# Patient Record
Sex: FEMALE | Race: WHITE | NOT HISPANIC OR LATINO | Employment: FULL TIME | URBAN - METROPOLITAN AREA
[De-identification: names, ages, dates, MRNs, and addresses within clinical notes are randomized per-mention and may not be internally consistent; named-entity substitution may affect disease eponyms.]

---

## 2017-02-01 RX ORDER — ATORVASTATIN CALCIUM 20 MG/1
TABLET, FILM COATED ORAL
Qty: 30 TABLET | Refills: 0 | Status: SHIPPED | OUTPATIENT
Start: 2017-02-01 | End: 2017-03-14 | Stop reason: SDUPTHER

## 2017-03-14 RX ORDER — ATORVASTATIN CALCIUM 20 MG/1
TABLET, FILM COATED ORAL
Qty: 30 TABLET | Refills: 0 | Status: SHIPPED | OUTPATIENT
Start: 2017-03-14 | End: 2017-06-02 | Stop reason: SDUPTHER

## 2017-05-23 ENCOUNTER — OFFICE VISIT (OUTPATIENT)
Dept: FAMILY MEDICINE CLINIC | Facility: CLINIC | Age: 52
End: 2017-05-23

## 2017-05-23 VITALS
TEMPERATURE: 97.9 F | RESPIRATION RATE: 16 BRPM | OXYGEN SATURATION: 98 % | HEIGHT: 64 IN | WEIGHT: 157 LBS | BODY MASS INDEX: 26.8 KG/M2 | SYSTOLIC BLOOD PRESSURE: 121 MMHG | DIASTOLIC BLOOD PRESSURE: 76 MMHG | HEART RATE: 106 BPM

## 2017-05-23 DIAGNOSIS — R05.9 COUGH: ICD-10-CM

## 2017-05-23 DIAGNOSIS — J06.9 ACUTE URI: Primary | ICD-10-CM

## 2017-05-23 DIAGNOSIS — R51.9 HEADACHE, UNSPECIFIED HEADACHE TYPE: ICD-10-CM

## 2017-05-23 DIAGNOSIS — R09.89 CHEST CONGESTION: ICD-10-CM

## 2017-05-23 PROCEDURE — 99214 OFFICE O/P EST MOD 30 MIN: CPT | Performed by: FAMILY MEDICINE

## 2017-05-23 RX ORDER — AZITHROMYCIN 250 MG/1
TABLET, FILM COATED ORAL
Qty: 6 TABLET | Refills: 0 | Status: SHIPPED | OUTPATIENT
Start: 2017-05-23 | End: 2017-06-02

## 2017-05-24 LAB
25(OH)D3+25(OH)D2 SERPL-MCNC: 22.9 NG/ML (ref 30–100)
ALBUMIN SERPL-MCNC: 5 G/DL (ref 3.5–5.2)
ALBUMIN/GLOB SERPL: 2.1 G/DL
ALP SERPL-CCNC: 123 U/L (ref 39–117)
ALT SERPL-CCNC: 19 U/L (ref 1–33)
AST SERPL-CCNC: 18 U/L (ref 1–32)
BASOPHILS # BLD AUTO: 0.02 10*3/MM3 (ref 0–0.2)
BASOPHILS NFR BLD AUTO: 0.3 % (ref 0–1.5)
BILIRUB SERPL-MCNC: 0.5 MG/DL (ref 0.1–1.2)
BUN SERPL-MCNC: 11 MG/DL (ref 6–20)
BUN/CREAT SERPL: 14.7 (ref 7–25)
CALCIUM SERPL-MCNC: 9.7 MG/DL (ref 8.6–10.5)
CHLORIDE SERPL-SCNC: 98 MMOL/L (ref 98–107)
CHOLEST SERPL-MCNC: 158 MG/DL (ref 0–200)
CO2 SERPL-SCNC: 27.3 MMOL/L (ref 22–29)
CREAT SERPL-MCNC: 0.75 MG/DL (ref 0.57–1)
EOSINOPHIL # BLD AUTO: 0.38 10*3/MM3 (ref 0–0.7)
EOSINOPHIL NFR BLD AUTO: 5.1 % (ref 0.3–6.2)
ERYTHROCYTE [DISTWIDTH] IN BLOOD BY AUTOMATED COUNT: 13.1 % (ref 11.7–13)
GLOBULIN SER CALC-MCNC: 2.4 GM/DL
GLUCOSE SERPL-MCNC: 94 MG/DL (ref 65–99)
HCT VFR BLD AUTO: 45.2 % (ref 35.6–45.5)
HDLC SERPL-MCNC: 43 MG/DL (ref 40–60)
HGB BLD-MCNC: 15.1 G/DL (ref 11.9–15.5)
IMM GRANULOCYTES # BLD: 0.03 10*3/MM3 (ref 0–0.03)
IMM GRANULOCYTES NFR BLD: 0.4 % (ref 0–0.5)
LDLC SERPL CALC-MCNC: 90 MG/DL (ref 0–100)
LYMPHOCYTES # BLD AUTO: 1.62 10*3/MM3 (ref 0.9–4.8)
LYMPHOCYTES NFR BLD AUTO: 21.9 % (ref 19.6–45.3)
MCH RBC QN AUTO: 30.7 PG (ref 26.9–32)
MCHC RBC AUTO-ENTMCNC: 33.4 G/DL (ref 32.4–36.3)
MCV RBC AUTO: 91.9 FL (ref 80.5–98.2)
MONOCYTES # BLD AUTO: 0.96 10*3/MM3 (ref 0.2–1.2)
MONOCYTES NFR BLD AUTO: 13 % (ref 5–12)
NEUTROPHILS # BLD AUTO: 4.39 10*3/MM3 (ref 1.9–8.1)
NEUTROPHILS NFR BLD AUTO: 59.3 % (ref 42.7–76)
PLATELET # BLD AUTO: 246 10*3/MM3 (ref 140–500)
POTASSIUM SERPL-SCNC: 4.6 MMOL/L (ref 3.5–5.2)
PROT SERPL-MCNC: 7.4 G/DL (ref 6–8.5)
RBC # BLD AUTO: 4.92 10*6/MM3 (ref 3.9–5.2)
SODIUM SERPL-SCNC: 140 MMOL/L (ref 136–145)
T4 FREE SERPL-MCNC: 1.1 NG/DL (ref 0.93–1.7)
TRIGL SERPL-MCNC: 126 MG/DL (ref 0–150)
TSH SERPL DL<=0.005 MIU/L-ACNC: 1.79 MIU/ML (ref 0.27–4.2)
VLDLC SERPL CALC-MCNC: 25.2 MG/DL (ref 5–40)
WBC # BLD AUTO: 7.4 10*3/MM3 (ref 4.5–10.7)

## 2017-06-02 ENCOUNTER — OFFICE VISIT (OUTPATIENT)
Dept: FAMILY MEDICINE CLINIC | Facility: CLINIC | Age: 52
End: 2017-06-02

## 2017-06-02 VITALS
WEIGHT: 159 LBS | RESPIRATION RATE: 16 BRPM | HEART RATE: 69 BPM | SYSTOLIC BLOOD PRESSURE: 122 MMHG | TEMPERATURE: 98.5 F | OXYGEN SATURATION: 97 % | BODY MASS INDEX: 27.14 KG/M2 | DIASTOLIC BLOOD PRESSURE: 82 MMHG | HEIGHT: 64 IN

## 2017-06-02 DIAGNOSIS — R09.89 BRUIT OF LEFT CAROTID ARTERY: ICD-10-CM

## 2017-06-02 DIAGNOSIS — R79.89 LFT ELEVATION: ICD-10-CM

## 2017-06-02 DIAGNOSIS — K21.9 GASTROESOPHAGEAL REFLUX DISEASE WITHOUT ESOPHAGITIS: ICD-10-CM

## 2017-06-02 DIAGNOSIS — E55.9 VITAMIN D DEFICIENCY: ICD-10-CM

## 2017-06-02 DIAGNOSIS — Z12.31 ENCOUNTER FOR SCREENING MAMMOGRAM FOR BREAST CANCER: ICD-10-CM

## 2017-06-02 DIAGNOSIS — N30.01 ACUTE CYSTITIS WITH HEMATURIA: ICD-10-CM

## 2017-06-02 DIAGNOSIS — E78.2 MIXED HYPERLIPIDEMIA: ICD-10-CM

## 2017-06-02 DIAGNOSIS — R31.9 BLOOD IN URINE: Primary | ICD-10-CM

## 2017-06-02 LAB
BILIRUB BLD-MCNC: NEGATIVE MG/DL
CLARITY, POC: CLEAR
COLOR UR: YELLOW
GLUCOSE UR STRIP-MCNC: NEGATIVE MG/DL
KETONES UR QL: NEGATIVE
LEUKOCYTE EST, POC: NEGATIVE
NITRITE UR-MCNC: NEGATIVE MG/ML
PH UR: 6 [PH] (ref 5–8)
PROT UR STRIP-MCNC: NEGATIVE MG/DL
RBC # UR STRIP: ABNORMAL /UL
SP GR UR: 1.02 (ref 1–1.03)
UROBILINOGEN UR QL: NORMAL

## 2017-06-02 PROCEDURE — 81003 URINALYSIS AUTO W/O SCOPE: CPT | Performed by: PHYSICIAN ASSISTANT

## 2017-06-02 PROCEDURE — 99214 OFFICE O/P EST MOD 30 MIN: CPT | Performed by: PHYSICIAN ASSISTANT

## 2017-06-02 RX ORDER — LANSOPRAZOLE 30 MG/1
30 CAPSULE, DELAYED RELEASE ORAL DAILY
Qty: 90 CAPSULE | Refills: 3 | Status: SHIPPED | OUTPATIENT
Start: 2017-06-02 | End: 2017-12-20

## 2017-06-02 RX ORDER — NITROFURANTOIN 25; 75 MG/1; MG/1
100 CAPSULE ORAL 2 TIMES DAILY
Qty: 14 CAPSULE | Refills: 0 | Status: SHIPPED | OUTPATIENT
Start: 2017-06-02 | End: 2017-07-19

## 2017-06-02 RX ORDER — ATORVASTATIN CALCIUM 20 MG/1
20 TABLET, FILM COATED ORAL DAILY
Qty: 90 TABLET | Refills: 3 | Status: SHIPPED | OUTPATIENT
Start: 2017-06-02 | End: 2017-12-20

## 2017-06-02 NOTE — PATIENT INSTRUCTIONS
Low glycemic index diet  Exercise 30 minutes most days of the week  Make sure you get results on any labs or tests we ordered today  We discussed medications and how to take them as prescribed  Sleep 6-8 hours each night if possible  If you have not signed up for [email or web address], please activate your code ASAP from your After Visit Summary today    LDL goal <100  LDL goal if heart disease <70  HDL goal >60  Triglyceride goal <150  BP goal =<130/80  Fasting glucose <100    Restart Vit D  Stop smoking

## 2017-06-02 NOTE — PROGRESS NOTES
Subjective   Anabella Baum is a 52 y.o. female.     History of Present Illness   Anabella Baum 52 y.o.female complains of urinary symptoms. she complains of urgency and frequency. She has had symptoms for 1 week.  Urinary C/o's for last 4 days and worse.  Not having the back pain.  The symptoms are moderate.  Patient denies urine with blood; darker red to brown.  Patient has tried  increasing fluids for relief of symptoms.  Patient does not have a history of recurrent UTI. Patient does not have a history of pyelonephritis.  Has hx renal stones and had lithotripsy in 2011 and hx of hematuria but RBC on 2013 urine micro neg      Anabella Baum 52 y.o. female who presents today for routine follow up check and medication refills.  she has a history of   Patient Active Problem List   Diagnosis   • Allergic reaction   • Carotid bruit   • GERD (gastroesophageal reflux disease)   • HLD (hyperlipidemia)   • CTS (carpal tunnel syndrome)   • Rash, skin   • Vitamin D deficiency   • Dyshidrotic eczema   .  Since the last visit, she has overall felt well.  She has GERD and is well controlled on PPI medication and Hyperlipidemia and is well controlled on medication.  she has been compliant with current medications have reviewed them.  The patient denies medication side effects.    Alk phos to repeat this and vit D 3mos  Still needs to repeat carotid doppler     Lab Results   Component Value Date    BUN 11 05/23/2017    CREATININE 0.75 05/23/2017    EGFRIFNONA 81 05/23/2017    EGFRIFAFRI 98 05/23/2017    BCR 14.7 05/23/2017    K 4.6 05/23/2017    CO2 27.3 05/23/2017    CALCIUM 9.7 05/23/2017    PROTENTOTREF 7.4 05/23/2017    ALBUMIN 5.00 05/23/2017    LABIL2 2.1 05/23/2017    AST 18 05/23/2017    ALT 19 05/23/2017     No results found for: CHOL  Lab Results   Component Value Date    TRIG 126 05/23/2017     Lab Results   Component Value Date    HDL 43 05/23/2017     No results found for: LDLCALC  Lab Results   Component Value Date     LDL 90 05/23/2017     No results found for: HDLLDLRATIO  No components found for: CHOLHDL  Lab Results   Component Value Date    TSH 1.790 05/23/2017       Vit D 22    The following portions of the patient's history were reviewed and updated as appropriate: allergies, current medications, past family history, past medical history, past social history, past surgical history and problem list.    Review of Systems   Constitutional: Negative for activity change, appetite change and unexpected weight change.   HENT: Negative for nosebleeds and trouble swallowing.    Eyes: Negative for pain and visual disturbance.   Respiratory: Negative for chest tightness, shortness of breath and wheezing.    Cardiovascular: Negative for chest pain and palpitations.   Gastrointestinal: Negative for abdominal pain and blood in stool.   Endocrine: Negative.    Genitourinary: Positive for frequency and urgency. Negative for difficulty urinating and hematuria.   Musculoskeletal: Negative for joint swelling.   Skin: Negative for color change and rash.   Allergic/Immunologic: Negative.    Neurological: Negative for syncope and speech difficulty.   Hematological: Negative for adenopathy.   Psychiatric/Behavioral: Negative for agitation and confusion.   All other systems reviewed and are negative.      Objective   Physical Exam   Constitutional: She is oriented to person, place, and time. She appears well-developed and well-nourished. No distress.   HENT:   Head: Normocephalic and atraumatic.   Eyes: Conjunctivae and EOM are normal. Pupils are equal, round, and reactive to light. Right eye exhibits no discharge. Left eye exhibits no discharge. No scleral icterus.   Neck: Normal range of motion. Neck supple. No tracheal deviation present. No thyromegaly present.   Cardiovascular: Normal rate, regular rhythm, normal heart sounds, intact distal pulses and normal pulses.  Exam reveals no gallop.    No murmur heard.  Left carotid bruit    Pulmonary/Chest: Effort normal and breath sounds normal. No respiratory distress. She has no wheezes. She has no rales.   Abdominal: Soft. There is no tenderness.   Musculoskeletal: Normal range of motion. She exhibits no tenderness.   Lymphadenopathy:     She has no cervical adenopathy.   Neurological: She is alert and oriented to person, place, and time. She exhibits normal muscle tone. Coordination normal.   Skin: Skin is warm. No rash noted. No erythema. No pallor.   Psychiatric: She has a normal mood and affect. Her behavior is normal. Judgment and thought content normal.   Nursing note and vitals reviewed.      Assessment/Plan   Anabella was seen today for blood in urine.    Diagnoses and all orders for this visit:    Blood in urine  -     POC Urinalysis Dipstick, Automated  -     Urinalysis With Microscopic  -     Urine Culture  -     Mammo Screening Bilateral With CAD; Future  -     Duplex Carotid Ultrasound CAR; Future  -     Hepatic Function Panel (6) ([facility]); Future  -     Vitamin D 25 Hydroxy; Future    Acute cystitis with hematuria  -     Urinalysis With Microscopic  -     Urine Culture  -     Mammo Screening Bilateral With CAD; Future  -     Duplex Carotid Ultrasound CAR; Future  -     Hepatic Function Panel (6) ([facility]); Future  -     Vitamin D 25 Hydroxy; Future    Bruit of left carotid artery  -     Urinalysis With Microscopic  -     Urine Culture  -     Mammo Screening Bilateral With CAD; Future  -     Duplex Carotid Ultrasound CAR; Future  -     Hepatic Function Panel (6) ([facility]); Future  -     Vitamin D 25 Hydroxy; Future    Gastroesophageal reflux disease without esophagitis  -     Urinalysis With Microscopic  -     Urine Culture  -     Mammo Screening Bilateral With CAD; Future  -     Duplex Carotid Ultrasound CAR; Future  -     Hepatic Function Panel (6) ([facility]); Future  -     Vitamin D 25 Hydroxy; Future    Vitamin D deficiency  -     Urinalysis With Microscopic  -     Urine Culture  -      Mammo Screening Bilateral With CAD; Future  -     Duplex Carotid Ultrasound CAR; Future  -     Hepatic Function Panel (6) ([facility]); Future  -     Vitamin D 25 Hydroxy; Future    Mixed hyperlipidemia  -     Urinalysis With Microscopic  -     Urine Culture  -     Mammo Screening Bilateral With CAD; Future  -     Duplex Carotid Ultrasound CAR; Future  -     Hepatic Function Panel (6) ([facility]); Future  -     Vitamin D 25 Hydroxy; Future    Encounter for screening mammogram for breast cancer  -     Urinalysis With Microscopic  -     Urine Culture  -     Mammo Screening Bilateral With CAD; Future  -     Duplex Carotid Ultrasound CAR; Future  -     Hepatic Function Panel (6) ([facility]); Future  -     Vitamin D 25 Hydroxy; Future    LFT elevation  -     Hepatic Function Panel (6) ([facility]); Future  -     Vitamin D 25 Hydroxy; Future    Other orders  -     nitrofurantoin, macrocrystal-monohydrate, (MACROBID) 100 MG capsule; Take 1 capsule by mouth 2 (Two) Times a Day. For UTI  -     lansoprazole (PREVACID) 30 MG capsule; Take 1 capsule by mouth Daily. For GERD  -     atorvastatin (LIPITOR) 20 MG tablet; Take 1 tablet by mouth Daily. For cholesterol

## 2017-06-04 LAB
APPEARANCE UR: CLEAR
BACTERIA #/AREA URNS HPF: ABNORMAL /[HPF]
BACTERIA UR CULT: NO GROWTH
BACTERIA UR CULT: NORMAL
BILIRUB UR QL STRIP: NEGATIVE
COLOR UR: YELLOW
CRYSTALS URNS MICRO: ABNORMAL
EPI CELLS #/AREA URNS HPF: ABNORMAL /HPF
GLUCOSE UR QL: NEGATIVE
HGB UR QL STRIP: (no result)
KETONES UR QL STRIP: NEGATIVE
LEUKOCYTE ESTERASE UR QL STRIP: NEGATIVE
MICRO URNS: (no result)
MUCOUS THREADS URNS QL MICRO: PRESENT
NITRITE UR QL STRIP: NEGATIVE
PH UR STRIP: 6 [PH] (ref 5–7.5)
PROT UR QL STRIP: NEGATIVE
RBC #/AREA URNS HPF: >30 /HPF
SP GR UR: 1.02 (ref 1–1.03)
UNIDENT CRYS URNS QL MICRO: PRESENT
UROBILINOGEN UR STRIP-MCNC: 0.2 MG/DL (ref 0.2–1)
WBC #/AREA URNS HPF: ABNORMAL /HPF

## 2017-07-14 ENCOUNTER — RESULTS ENCOUNTER (OUTPATIENT)
Dept: FAMILY MEDICINE CLINIC | Facility: CLINIC | Age: 52
End: 2017-07-14

## 2017-07-14 DIAGNOSIS — E55.9 VITAMIN D DEFICIENCY: ICD-10-CM

## 2017-07-14 DIAGNOSIS — R09.89 BRUIT OF LEFT CAROTID ARTERY: ICD-10-CM

## 2017-07-14 DIAGNOSIS — R79.89 LFT ELEVATION: ICD-10-CM

## 2017-07-14 DIAGNOSIS — R31.9 BLOOD IN URINE: ICD-10-CM

## 2017-07-14 DIAGNOSIS — K21.9 GASTROESOPHAGEAL REFLUX DISEASE WITHOUT ESOPHAGITIS: ICD-10-CM

## 2017-07-14 DIAGNOSIS — N30.01 ACUTE CYSTITIS WITH HEMATURIA: ICD-10-CM

## 2017-07-14 DIAGNOSIS — Z12.31 ENCOUNTER FOR SCREENING MAMMOGRAM FOR BREAST CANCER: ICD-10-CM

## 2017-07-14 DIAGNOSIS — E78.2 MIXED HYPERLIPIDEMIA: ICD-10-CM

## 2017-07-19 ENCOUNTER — OFFICE VISIT (OUTPATIENT)
Dept: FAMILY MEDICINE CLINIC | Facility: CLINIC | Age: 52
End: 2017-07-19

## 2017-07-19 VITALS
BODY MASS INDEX: 26.29 KG/M2 | RESPIRATION RATE: 16 BRPM | HEIGHT: 64 IN | TEMPERATURE: 98.6 F | OXYGEN SATURATION: 96 % | SYSTOLIC BLOOD PRESSURE: 132 MMHG | DIASTOLIC BLOOD PRESSURE: 88 MMHG | WEIGHT: 154 LBS | HEART RATE: 91 BPM

## 2017-07-19 DIAGNOSIS — J20.9 ACUTE BRONCHITIS DUE TO INFECTION: Primary | ICD-10-CM

## 2017-07-19 DIAGNOSIS — M94.0 COSTOCHONDRITIS, ACUTE: ICD-10-CM

## 2017-07-19 PROCEDURE — 99213 OFFICE O/P EST LOW 20 MIN: CPT | Performed by: PHYSICIAN ASSISTANT

## 2017-07-19 PROCEDURE — 71020 XR CHEST PA AND LATERAL: CPT | Performed by: PHYSICIAN ASSISTANT

## 2017-07-19 RX ORDER — AZITHROMYCIN 250 MG/1
TABLET, FILM COATED ORAL
Qty: 6 TABLET | Refills: 1 | Status: SHIPPED | OUTPATIENT
Start: 2017-07-19 | End: 2018-08-16

## 2017-07-19 RX ORDER — METHYLPREDNISOLONE 4 MG/1
TABLET ORAL
Qty: 21 TABLET | Refills: 0 | Status: SHIPPED | OUTPATIENT
Start: 2017-07-19 | End: 2018-08-16

## 2017-07-19 RX ORDER — METHYLPREDNISOLONE 4 MG/1
TABLET ORAL
Refills: 0 | COMMUNITY
Start: 2017-07-15 | End: 2017-07-19 | Stop reason: SDUPTHER

## 2017-07-19 NOTE — PATIENT INSTRUCTIONS
Stop smoking    Costochondritis  Costochondritis, sometimes called Tietze syndrome, is a swelling and irritation (inflammation) of the tissue (cartilage) that connects your ribs with your breastbone (sternum). It causes pain in the chest and rib area. Costochondritis usually goes away on its own over time. It can take up to 6 weeks or longer to get better, especially if you are unable to limit your activities.  CAUSES   Some cases of costochondritis have no known cause. Possible causes include:  · Injury (trauma).  · Exercise or activity such as lifting.  · Severe coughing.  SIGNS AND SYMPTOMS  · Pain and tenderness in the chest and rib area.  · Pain that gets worse when coughing or taking deep breaths.  · Pain that gets worse with specific movements.  DIAGNOSIS   Your health care provider will do a physical exam and ask about your symptoms. Chest X-rays or other tests may be done to rule out other problems.  TREATMENT   Costochondritis usually goes away on its own over time. Your health care provider may prescribe medicine to help relieve pain.  HOME CARE INSTRUCTIONS   · Avoid exhausting physical activity. Try not to strain your ribs during normal activity. This would include any activities using chest, abdominal, and side muscles, especially if heavy weights are used.  · Apply ice to the affected area for the first 2 days after the pain begins.  ¨ Put ice in a plastic bag.  ¨ Place a towel between your skin and the bag.  ¨ Leave the ice on for 20 minutes, 2-3 times a day.  · Only take over-the-counter or prescription medicines as directed by your health care provider.  SEEK MEDICAL CARE IF:  · You have redness or swelling at the rib joints. These are signs of infection.  · Your pain does not go away despite rest or medicine.  SEEK IMMEDIATE MEDICAL CARE IF:   · Your pain increases or you are very uncomfortable.  · You have shortness of breath or difficulty breathing.  · You cough up blood.  · You have worse chest  pains, sweating, or vomiting.  · You have a fever or persistent symptoms for more than 2-3 days.  · You have a fever and your symptoms suddenly get worse.  MAKE SURE YOU:   · Understand these instructions.  · Will watch your condition.  · Will get help right away if you are not doing well or get worse.     This information is not intended to replace advice given to you by your health care provider. Make sure you discuss any questions you have with your health care provider.     Document Released: 09/27/2006 Document Revised: 10/08/2014 Document Reviewed: 07/22/2014  [employer] Interactive Patient Education ©2017 [employer]  See me one week if not resolving  Call if worse  Watching bp

## 2017-07-19 NOTE — PROGRESS NOTES
Subjective   Anabella Baum is a 52 y.o. female.     History of Present Illness   Anabella Baum 52 y.o. female who presents for evaluation of upper respiratory congestion, acute bronchitis, cough, wheezing, shortness of air. Symptoms include cough described as productive of green sputum and right anterior chest wall pain to touch and with deep breathing--which is better.  Still sore and coughing.  She is smoker.  Onset of symptoms was 2 weeks ago, gradually improving since that time. Patient denies fever.   Evaluation to date: was seen at [facility]---she had no Xray;  Medrol Dosepak and tessalon.  First dose pred helped more, Treatment to date:  OTC antihistamines.     NO antibiotics at Urgent Care  Wants CXR  Having lithotripsy 8-11-17  I will reround Medrol lamar  X-Ray  Interpretation report in house X-rays that I personally viewed    Relevant Clinical Issues/Diagnoses/Indications:  cough        Clinical Findings:  No infiltrate, heart size normal, no mass          Comparative Data:  none          Date of Previous X-ray:  Change on current X-ray    The following portions of the patient's history were reviewed and updated as appropriate: allergies, current medications, past family history, past medical history, past social history, past surgical history and problem list.    Review of Systems   Constitutional: Negative for activity change and unexpected weight change.   HENT: Positive for congestion. Negative for ear pain.    Eyes: Negative for pain and discharge.   Respiratory: Positive for cough, chest tightness, shortness of breath and wheezing.    Cardiovascular: Negative for chest pain and palpitations.   Gastrointestinal: Negative for abdominal pain, diarrhea and vomiting.   Endocrine: Negative.    Genitourinary: Positive for hematuria and urgency.   Musculoskeletal: Negative for joint swelling.   Skin: Negative for color change.   Allergic/Immunologic: Positive for environmental allergies.   Neurological:  Negative for seizures and syncope.   Psychiatric/Behavioral: Negative.        Objective   Physical Exam   Constitutional: She is oriented to person, place, and time. She appears well-developed and well-nourished.  Non-toxic appearance. No distress.   HENT:   Head: Normocephalic and atraumatic. Hair is normal.   Right Ear: External ear normal. No drainage, swelling or tenderness. Tympanic membrane is retracted.   Left Ear: External ear normal. No drainage, swelling or tenderness. Tympanic membrane is retracted.   Nose: Mucosal edema present. No epistaxis.   Mouth/Throat: Uvula is midline and mucous membranes are normal. No oral lesions. No uvula swelling. Posterior oropharyngeal erythema present. No oropharyngeal exudate.   Eyes: Conjunctivae and EOM are normal. Pupils are equal, round, and reactive to light. Right eye exhibits no discharge. Left eye exhibits no discharge. No scleral icterus.   Neck: Normal range of motion. Neck supple.   Cardiovascular: Normal rate, regular rhythm and normal heart sounds.  Exam reveals no gallop.    No murmur heard.  Pulmonary/Chest: Breath sounds normal. No stridor. No respiratory distress. She has no wheezes. She has no rales. She exhibits no tenderness.   Abdominal: Soft. There is no tenderness.   Lymphadenopathy:     She has cervical adenopathy.   Neurological: She is alert and oriented to person, place, and time. She exhibits normal muscle tone.   Skin: Skin is warm and dry. No rash noted. She is not diaphoretic.   Psychiatric: She has a normal mood and affect. Her behavior is normal. Judgment and thought content normal.   Nursing note and vitals reviewed.      Assessment/Plan   Problems Addressed this Visit     None      Visit Diagnoses     Acute bronchitis due to infection    -  Primary    Relevant Medications    azithromycin (ZITHROMAX) 250 MG tablet    Costochondritis, acute        Relevant Orders    XR Chest PA & Lateral

## 2017-08-02 ENCOUNTER — LAB (OUTPATIENT)
Dept: LAB | Facility: HOSPITAL | Age: 52
End: 2017-08-02
Attending: UROLOGY

## 2017-08-02 ENCOUNTER — TRANSCRIBE ORDERS (OUTPATIENT)
Dept: ADMINISTRATIVE | Facility: HOSPITAL | Age: 52
End: 2017-08-02

## 2017-08-02 ENCOUNTER — HOSPITAL ENCOUNTER (OUTPATIENT)
Dept: CARDIOLOGY | Facility: HOSPITAL | Age: 52
Discharge: HOME | End: 2017-08-02
Attending: UROLOGY | Admitting: UROLOGY

## 2017-08-02 DIAGNOSIS — N20.0 RENAL STONES: Primary | ICD-10-CM

## 2017-08-02 DIAGNOSIS — N20.0 RENAL STONES: ICD-10-CM

## 2017-08-02 LAB
ANION GAP SERPL CALCULATED.3IONS-SCNC: 11.7 MMOL/L
BUN BLD-MCNC: 11 MG/DL (ref 6–20)
BUN/CREAT SERPL: 16.7 (ref 7–25)
CALCIUM SPEC-SCNC: 9.7 MG/DL (ref 8.6–10.5)
CHLORIDE SERPL-SCNC: 101 MMOL/L (ref 98–107)
CO2 SERPL-SCNC: 26.3 MMOL/L (ref 22–29)
CREAT BLD-MCNC: 0.66 MG/DL (ref 0.57–1)
DEPRECATED RDW RBC AUTO: 45.2 FL (ref 37–54)
ERYTHROCYTE [DISTWIDTH] IN BLOOD BY AUTOMATED COUNT: 13.1 % (ref 11.7–13)
GFR SERPL CREATININE-BSD FRML MDRD: 94 ML/MIN/1.73
GLUCOSE BLD-MCNC: 72 MG/DL (ref 65–99)
HCT VFR BLD AUTO: 40.8 % (ref 35.6–45.5)
HGB BLD-MCNC: 13.9 G/DL (ref 11.9–15.5)
MCH RBC QN AUTO: 32 PG (ref 26.9–32)
MCHC RBC AUTO-ENTMCNC: 34.1 G/DL (ref 32.4–36.3)
MCV RBC AUTO: 94 FL (ref 80.5–98.2)
PLATELET # BLD AUTO: 191 10*3/MM3 (ref 140–500)
PMV BLD AUTO: 10.8 FL (ref 6–12)
POTASSIUM BLD-SCNC: 3.9 MMOL/L (ref 3.5–5.2)
RBC # BLD AUTO: 4.34 10*6/MM3 (ref 3.9–5.2)
SODIUM BLD-SCNC: 139 MMOL/L (ref 136–145)
WBC NRBC COR # BLD: 10.69 10*3/MM3 (ref 4.5–10.7)

## 2017-08-02 PROCEDURE — 80048 BASIC METABOLIC PNL TOTAL CA: CPT

## 2017-08-02 PROCEDURE — 36415 COLL VENOUS BLD VENIPUNCTURE: CPT

## 2017-08-02 PROCEDURE — 85027 COMPLETE CBC AUTOMATED: CPT

## 2017-08-02 PROCEDURE — 93005 ELECTROCARDIOGRAM TRACING: CPT | Performed by: UROLOGY

## 2017-08-02 PROCEDURE — 93010 ELECTROCARDIOGRAM REPORT: CPT | Performed by: INTERNAL MEDICINE

## 2017-12-20 RX ORDER — ATORVASTATIN CALCIUM 20 MG/1
TABLET, FILM COATED ORAL
Qty: 30 TABLET | Refills: 0 | Status: SHIPPED | OUTPATIENT
Start: 2017-12-20 | End: 2018-06-24 | Stop reason: SDUPTHER

## 2017-12-20 RX ORDER — TIZANIDINE 4 MG/1
TABLET ORAL
Qty: 45 TABLET | Refills: 0 | Status: SHIPPED | OUTPATIENT
Start: 2017-12-20 | End: 2018-08-16

## 2017-12-20 RX ORDER — LANSOPRAZOLE 30 MG/1
CAPSULE, DELAYED RELEASE ORAL
Qty: 90 CAPSULE | Refills: 0 | Status: SHIPPED | OUTPATIENT
Start: 2017-12-20 | End: 2018-06-10 | Stop reason: SDUPTHER

## 2018-01-24 ENCOUNTER — TRANSCRIBE ORDERS (OUTPATIENT)
Dept: ADMINISTRATIVE | Facility: HOSPITAL | Age: 53
End: 2018-01-24

## 2018-01-24 DIAGNOSIS — Z12.39 SCREENING BREAST EXAMINATION: Primary | ICD-10-CM

## 2018-02-21 ENCOUNTER — HOSPITAL ENCOUNTER (OUTPATIENT)
Dept: MAMMOGRAPHY | Facility: HOSPITAL | Age: 53
Discharge: HOME | End: 2018-02-21
Admitting: PHYSICIAN ASSISTANT

## 2018-02-21 DIAGNOSIS — Z12.39 SCREENING BREAST EXAMINATION: ICD-10-CM

## 2018-02-21 PROCEDURE — 77063 BREAST TOMOSYNTHESIS BI: CPT

## 2018-02-21 PROCEDURE — 77067 SCR MAMMO BI INCL CAD: CPT

## 2018-06-10 RX ORDER — LANSOPRAZOLE 30 MG/1
CAPSULE, DELAYED RELEASE ORAL
Qty: 90 CAPSULE | Refills: 0 | Status: SHIPPED | OUTPATIENT
Start: 2018-06-10 | End: 2018-08-16 | Stop reason: SDUPTHER

## 2018-06-24 RX ORDER — ATORVASTATIN CALCIUM 20 MG/1
20 TABLET, FILM COATED ORAL DAILY
Qty: 90 TABLET | Refills: 0 | Status: SHIPPED | OUTPATIENT
Start: 2018-06-24 | End: 2018-08-16 | Stop reason: SDUPTHER

## 2018-08-16 ENCOUNTER — OFFICE VISIT (OUTPATIENT)
Dept: FAMILY MEDICINE CLINIC | Facility: CLINIC | Age: 53
End: 2018-08-16

## 2018-08-16 VITALS
HEART RATE: 84 BPM | BODY MASS INDEX: 26.46 KG/M2 | HEIGHT: 64 IN | OXYGEN SATURATION: 99 % | DIASTOLIC BLOOD PRESSURE: 80 MMHG | RESPIRATION RATE: 16 BRPM | WEIGHT: 155 LBS | TEMPERATURE: 98.5 F | SYSTOLIC BLOOD PRESSURE: 120 MMHG

## 2018-08-16 DIAGNOSIS — K21.9 GASTROESOPHAGEAL REFLUX DISEASE WITHOUT ESOPHAGITIS: Primary | ICD-10-CM

## 2018-08-16 DIAGNOSIS — E55.9 VITAMIN D DEFICIENCY: ICD-10-CM

## 2018-08-16 DIAGNOSIS — R09.89 BRUIT OF LEFT CAROTID ARTERY: ICD-10-CM

## 2018-08-16 PROCEDURE — 99214 OFFICE O/P EST MOD 30 MIN: CPT | Performed by: PHYSICIAN ASSISTANT

## 2018-08-16 RX ORDER — LANSOPRAZOLE 30 MG/1
30 CAPSULE, DELAYED RELEASE ORAL DAILY
Qty: 90 CAPSULE | Refills: 3 | Status: SHIPPED | OUTPATIENT
Start: 2018-08-16 | End: 2019-03-26 | Stop reason: ALTCHOICE

## 2018-08-16 RX ORDER — ATORVASTATIN CALCIUM 20 MG/1
20 TABLET, FILM COATED ORAL DAILY
Qty: 90 TABLET | Refills: 0 | Status: SHIPPED | OUTPATIENT
Start: 2018-08-16 | End: 2019-02-04

## 2018-08-16 RX ORDER — METHYLPREDNISOLONE 4 MG/1
TABLET ORAL
Qty: 21 TABLET | Refills: 0 | Status: SHIPPED | OUTPATIENT
Start: 2018-08-16 | End: 2018-11-08

## 2018-08-16 RX ORDER — RANITIDINE 150 MG/1
150 TABLET ORAL 2 TIMES DAILY
Qty: 180 TABLET | Refills: 3 | Status: SHIPPED | OUTPATIENT
Start: 2018-08-16 | End: 2019-03-26 | Stop reason: SDUPTHER

## 2018-08-16 NOTE — PATIENT INSTRUCTIONS
Patient is well controlled on PPI and will do trial of H2 blocker like Zantac for step down therapy.  she knows to change back to PPI if develops GERD on H2 blocker.      Cervical Radiculopathy  Cervical radiculopathy happens when a nerve in the neck (cervical nerve) is pinched or bruised. This condition can develop because of an injury or as part of the normal aging process. Pressure on the cervical nerves can cause pain or numbness that runs from the neck all the way down into the arm and fingers. Usually, this condition gets better with rest. Treatment may be needed if the condition does not improve.  What are the causes?  This condition may be caused by:  · Injury.  · Slipped (herniated) disk.  · Muscle tightness in the neck because of overuse.  · Arthritis.  · Breakdown or degeneration in the bones and joints of the spine (spondylosis) due to aging.  · Bone spurs that may develop near the cervical nerves.    What are the signs or symptoms?  Symptoms of this condition include:  · Pain that runs from the neck to the arm and hand. The pain can be severe or irritating. It may be worse when the neck is moved.  · Numbness or weakness in the affected arm and hand.    How is this diagnosed?  This condition may be diagnosed based on symptoms, medical history, and a physical exam. You may also have tests, including:  · X-rays.  · CT scan.  · MRI.  · Electromyogram (EMG).  · Nerve conduction tests.    How is this treated?  In many cases, treatment is not needed for this condition. With rest, the condition usually gets better over time. If treatment is needed, options may include:  · Wearing a soft neck collar for short periods of time.  · Physical therapy to strengthen your neck muscles.  · Medicines, such as NSAIDs, oral corticosteroids, or spinal injections.  · Surgery. This may be needed if other treatments do not help. Various types of surgery may be done depending on the cause of your problems.    Follow these  instructions at home:  Managing pain  · Take over-the-counter and prescription medicines only as told by your health care provider.  · If directed, apply ice to the affected area.  ? Put ice in a plastic bag.  ? Place a towel between your skin and the bag.  ? Leave the ice on for 20 minutes, 2-3 times per day.  · If ice does not help, you can try using heat. Take a warm shower or warm bath, or use a heat pack as told by your health care provider.  · Try a gentle neck and shoulder massage to help relieve symptoms.  Activity  · Rest as needed. Follow instructions from your health care provider about any restrictions on activities.  · Do stretching and strengthening exercises as told by your health care provider or physical therapist.  General instructions  · If you were given a soft collar, wear it as told by your health care provider.  · Use a flat pillow when you sleep.  · Keep all follow-up visits as told by your health care provider. This is important.  Contact a health care provider if:  · Your condition does not improve with treatment.  Get help right away if:  · Your pain gets much worse and cannot be controlled with medicines.  · You have weakness or numbness in your hand, arm, face, or leg.  · You have a high fever.  · You have a stiff, rigid neck.  · You lose control of your bowels or your bladder (have incontinence).  · You have trouble with walking, balance, or speaking.  This information is not intended to replace advice given to you by your health care provider. Make sure you discuss any questions you have with your health care provider.  Document Released: 09/12/2002 Document Revised: 05/25/2017 Document Reviewed: 02/11/2016  [employer] Interactive Patient Education © 2018 [employer]    Low glycemic index diet  Exercise 30 minutes most days of the week  Make sure you get results on any labs or tests we ordered today  We discussed medications and how to take them as prescribed  Sleep 6-8 hours each  night if possible  If you have not signed up for [employer], please activate your code ASAP from your After Visit Summary today    LDL goal <100  LDL goal if heart disease <70  HDL goal >60  Triglyceride goal <150  BP goal =<130/80  Fasting glucose <100    ice

## 2018-08-16 NOTE — PROGRESS NOTES
Subjective   Anabella Baum is a 53 y.o. female.     History of Present Illness   Anabella Baum 53 y.o. female who presents today for routine follow up check and medication refills.  she has a history of   Patient Active Problem List   Diagnosis   • Allergic reaction   • Carotid bruit   • GERD (gastroesophageal reflux disease)   • HLD (hyperlipidemia)   • CTS (carpal tunnel syndrome)   • Rash, skin   • Vitamin D deficiency   • Dyshidrotic eczema   .  Since the last visit, she has overall felt fairly well.  She has GERD and has been well controlled on PPI and will do trial of H2 blocker and if GERD change back to PPI, Hyperlipidemia and here to discuss treatment and Vitamin D deficiency and will update labs to confirm level is at goal >30.  she has been compliant with current medications have reviewed them.  The patient denies medication side effects.    Needs carotid u/s update and last done 2013 with mild disease; bruit on left  Aunt and mom with thyroid  Mom and sis breast cancer  Results for orders placed or performed in visit on 08/02/17   CBC (No Diff)   Result Value Ref Range    WBC 10.69 4.50 - 10.70 10*3/mm3    RBC 4.34 3.90 - 5.20 10*6/mm3    Hemoglobin 13.9 11.9 - 15.5 g/dL    Hematocrit 40.8 35.6 - 45.5 %    MCV 94.0 80.5 - 98.2 fL    MCH 32.0 26.9 - 32.0 pg    MCHC 34.1 32.4 - 36.3 g/dL    RDW 13.1 (H) 11.7 - 13.0 %    RDW-SD 45.2 37.0 - 54.0 fl    MPV 10.8 6.0 - 12.0 fL    Platelets 191 140 - 500 10*3/mm3   Basic Metabolic Panel   Result Value Ref Range    Glucose 72 65 - 99 mg/dL    BUN 11 6 - 20 mg/dL    Creatinine 0.66 0.57 - 1.00 mg/dL    Sodium 139 136 - 145 mmol/L    Potassium 3.9 3.5 - 5.2 mmol/L    Chloride 101 98 - 107 mmol/L    CO2 26.3 22.0 - 29.0 mmol/L    Calcium 9.7 8.6 - 10.5 mg/dL    eGFR Non African Amer 94 >60 mL/min/1.73    BUN/Creatinine Ratio 16.7 7.0 - 25.0    Anion Gap 11.7 mmol/L     Anabella Baum 53 y.o. female who presents for evaluation of neck pain--more left shoulder pain and  sore to lay on it and ROM. Event that precipitated these symptoms:  nothing in particular but has a history of DDD of C-Spine  {Onset of symptoms was 1 month ago, and have been unchanged since that time.  Location of this is in the some N/T from wrist down to finger tips area.   Current pain symptoms are described as aching and occurs intermittently.  The pain intensity is mild and moderate.  Other associated symptoms include:  numbness, tingling and aching. Treatment efforts have included: nothing helps. without relief.  Patient has had no prior neck problems and saw me in past for neck pain; left shoulder is new.     8-2-16 did C spine Xrays and showed DDD    I am concerned about left shoulder bursitis and cervical DDD with the hand n/t; will do pred with allergy NSAID  The following portions of the patient's history were reviewed and updated as appropriate: allergies, current medications, past family history, past medical history, past social history, past surgical history and problem list.    Review of Systems   Constitutional: Negative for activity change, appetite change and unexpected weight change.   HENT: Positive for ear discharge. Negative for nosebleeds and trouble swallowing.    Eyes: Negative for pain and visual disturbance.   Respiratory: Positive for cough. Negative for chest tightness, shortness of breath and wheezing.    Cardiovascular: Negative for chest pain and palpitations.   Gastrointestinal: Negative for abdominal pain and blood in stool.   Endocrine: Negative.    Genitourinary: Negative for difficulty urinating and hematuria.   Musculoskeletal: Positive for neck pain. Negative for joint swelling.   Skin: Negative for color change and rash.   Allergic/Immunologic: Negative.    Neurological: Positive for numbness. Negative for syncope and speech difficulty.   Hematological: Negative for adenopathy.   Psychiatric/Behavioral: Negative for agitation and confusion.   All other systems reviewed and  are negative.      Objective   Physical Exam   Constitutional: She is oriented to person, place, and time. She appears well-developed and well-nourished. No distress.   HENT:   Head: Normocephalic and atraumatic.   Eyes: Pupils are equal, round, and reactive to light. Conjunctivae and EOM are normal. Right eye exhibits no discharge. Left eye exhibits no discharge. No scleral icterus.   Neck: Normal range of motion. Neck supple. No tracheal deviation present. No thyromegaly present.   Cardiovascular: Normal rate, regular rhythm, normal heart sounds, intact distal pulses and normal pulses.  Exam reveals no gallop.    No murmur heard.  Bruit left carotid   Pulmonary/Chest: Effort normal and breath sounds normal. No respiratory distress. She has no wheezes. She has no rales.   Musculoskeletal: Normal range of motion. She exhibits tenderness.   Shoulder pain with ROM  Slight trapezius soreness with ROM and touch    Lymphadenopathy:     She has no cervical adenopathy.   Neurological: She is alert and oriented to person, place, and time. No sensory deficit. She exhibits normal muscle tone. Coordination normal.   Skin: Skin is warm. No erythema. No pallor.   Psychiatric: She has a normal mood and affect. Her behavior is normal. Judgment and thought content normal.   Nursing note and vitals reviewed.      Assessment/Plan   Anabella was seen today for neck pain and numbness.    Diagnoses and all orders for this visit:    Gastroesophageal reflux disease without esophagitis  -     Comprehensive metabolic panel  -     Lipid panel  -     CBC and Differential  -     TSH  -     Vitamin D 25 Hydroxy  -     T4, Free  -     Duplex Carotid Ultrasound CAR; Future  -     T3, Free    Vitamin D deficiency  -     Comprehensive metabolic panel  -     Lipid panel  -     CBC and Differential  -     TSH  -     Vitamin D 25 Hydroxy  -     T4, Free  -     Duplex Carotid Ultrasound CAR; Future  -     T3, Free    Bruit of left carotid artery  -      Comprehensive metabolic panel  -     Lipid panel  -     CBC and Differential  -     TSH  -     Vitamin D 25 Hydroxy  -     T4, Free  -     Duplex Carotid Ultrasound CAR; Future  -     T3, Free    Other orders  -     lansoprazole (PREVACID) 30 MG capsule; Take 1 capsule by mouth Daily. For GERD  -     atorvastatin (LIPITOR) 20 MG tablet; Take 1 tablet by mouth Daily. For cholesterol  -     raNITIdine (ZANTAC) 150 MG tablet; Take 1 tablet by mouth 2 (Two) Times a Day. For GERD; see if can replace PPI  -     MethylPREDNISolone (MEDROL, PARRISH,) 4 MG tablet; follow package directions

## 2018-08-29 ENCOUNTER — APPOINTMENT (OUTPATIENT)
Dept: CARDIOLOGY | Facility: HOSPITAL | Age: 53
End: 2018-08-29

## 2018-10-03 ENCOUNTER — TELEPHONE (OUTPATIENT)
Dept: OBSTETRICS AND GYNECOLOGY | Age: 53
End: 2018-10-03

## 2018-10-03 NOTE — TELEPHONE ENCOUNTER
NEW PT- Coy requested    Abie    Est care, needs AE.  Denies issues. Ref by Manuela Baum    Pt # [phone]

## 2018-11-08 ENCOUNTER — OFFICE VISIT (OUTPATIENT)
Dept: OBSTETRICS AND GYNECOLOGY | Age: 53
End: 2018-11-08

## 2018-11-08 VITALS
DIASTOLIC BLOOD PRESSURE: 74 MMHG | WEIGHT: 152 LBS | HEIGHT: 64 IN | SYSTOLIC BLOOD PRESSURE: 110 MMHG | BODY MASS INDEX: 25.95 KG/M2

## 2018-11-08 DIAGNOSIS — Z12.4 ROUTINE CERVICAL SMEAR: ICD-10-CM

## 2018-11-08 DIAGNOSIS — Z01.419 ENCOUNTER FOR GYNECOLOGICAL EXAMINATION WITHOUT ABNORMAL FINDING: ICD-10-CM

## 2018-11-08 DIAGNOSIS — Z72.0 TOBACCO ABUSE: ICD-10-CM

## 2018-11-08 DIAGNOSIS — Z11.51 SPECIAL SCREENING EXAMINATION FOR HUMAN PAPILLOMAVIRUS (HPV): ICD-10-CM

## 2018-11-08 DIAGNOSIS — Z84.81 FAMILY HISTORY OF BREAST CANCER GENE MUTATION IN FIRST DEGREE RELATIVE: ICD-10-CM

## 2018-11-08 DIAGNOSIS — Z13.89 SCREENING FOR HEMATURIA OR PROTEINURIA: Primary | ICD-10-CM

## 2018-11-08 LAB
BILIRUB BLD-MCNC: NEGATIVE MG/DL
CLARITY, POC: CLEAR
COLOR UR: YELLOW
GLUCOSE UR STRIP-MCNC: NEGATIVE MG/DL
KETONES UR QL: NEGATIVE
LEUKOCYTE EST, POC: NEGATIVE
NITRITE UR-MCNC: NEGATIVE MG/ML
PH UR: 5.5 [PH] (ref 5–8)
PROT UR STRIP-MCNC: NEGATIVE MG/DL
RBC # UR STRIP: ABNORMAL /UL
SP GR UR: 1.02 (ref 1–1.03)
UROBILINOGEN UR QL: NORMAL

## 2018-11-08 PROCEDURE — 99386 PREV VISIT NEW AGE 40-64: CPT | Performed by: OBSTETRICS & GYNECOLOGY

## 2018-11-08 PROCEDURE — 81002 URINALYSIS NONAUTO W/O SCOPE: CPT | Performed by: OBSTETRICS & GYNECOLOGY

## 2018-11-08 RX ORDER — BUPROPION HYDROCHLORIDE 100 MG/1
100 TABLET, EXTENDED RELEASE ORAL EVERY 12 HOURS SCHEDULED
Qty: 60 TABLET | Refills: 11 | Status: SHIPPED | OUTPATIENT
Start: 2018-11-08 | End: 2020-09-08 | Stop reason: SDUPTHER

## 2018-11-08 NOTE — PROGRESS NOTES
Subjective     Chief Complaint   Patient presents with   • Gynecologic Exam     New        History of Present Illness    Anabella Baum is a 53 y.o.  who presents for annual exam.  The patient's first visit in our office.  Patient has a history of delivering quadruplets by .  They are now 19 years old.  Patient works as a manager at a [employer].  Her last Pap smear was about 3 years ago.  She does have a history of renal stones and follows with urology.  The patient's is a smoker.  She is motivated to quit.  She had menopause at age 48 has had no symptoms.  Postmenopausal bleeding or pelvic pain.  That a significant history of breast cancer in her sister and maternal aunt.  They have both been tested for genetic causes and were negative.  PM age 49.   Obstetric History:  OB History      Para Term  AB Living    1 1 1     4    SAB TAB Ectopic Molar Multiple Live Births            1 4         Menstrual History:     No LMP recorded (lmp unknown). Patient is postmenopausal.         Current contraception: vasectomy  History of abnormal Pap smear: no  Received Gardasil immunization: no  Perform regular self breast exam: no  Family history of uterine or ovarian cancer: no  Family History of colon cancer: yes - cousin 40  Family history of breast cancer: yes - sister 47 and mat aunt 60  genetic testing for both was negative    Mammogram: up to date.  Colonoscopy: up to date. Age 49   DEXA: ordered.    Exercise: not active  Calcium/Vitamin D: inadequate intake    The following portions of the patient's history were reviewed and updated as appropriate: allergies, current medications, past family history, past medical history, past social history, past surgical history and problem list.    Review of Systems    Review of Systems   Constitutional: Negative for fatigue.   Respiratory: Negative for shortness of breath.    Gastrointestinal: Negative for abdominal pain.   Genitourinary:  "Negative for dysuria.   Neurological: Negative for headaches.   Psychiatric/Behavioral: Negative for dysphoric mood.         Objective   Physical Exam    /74   Ht 162.6 cm (64\")   Wt 68.9 kg (152 lb)   LMP  (LMP Unknown)   BMI 26.09 kg/m²     General:   alert, appears stated age and cooperative   Neck: no adenopathy and thyroid normal to palpation   Heart: regular rate and rhythm   Lungs: clear to auscultation bilaterally   Abdomen: soft, non-tender, without masses or organomegaly   Breast: inspection negative, no nipple discharge or bleeding, no masses or nodularity palpable   Vulva: normal, Bartholin's, Urethra, [place]'s normal   Vagina: normal mucosa, normal discharge   Cervix: no cervical motion tenderness and no lesions   Uterus: mobile, non-tender, normal shape and consistency   Adnexa: no mass, fullness, tenderness   Rectal: normal rectal, no masses     Assessment/Plan   Anabella was seen today for gynecologic exam.    Diagnoses and all orders for this visit:    Screening for hematuria or proteinuria  -     POC Urinalysis Dipstick    Tobacco abuse    Encounter for gynecological examination without abnormal finding  -     IGP, Aptima HPV, Rfx 16 / 18,45    Routine cervical smear  -     IGP, Aptima HPV, Rfx 16 / 18,45    Special screening examination for human papillomavirus (HPV)  -     IGP, Aptima HPV, Rfx 16 / 18,45    Family history of breast cancer gene mutation in first degree relative  -     MRI Breast Bilateral With & Without Contrast; Future    Other orders  -      DEXA SCAN  -     buPROPion SR (WELLBUTRIN SR) 100 MG 12 hr tablet; Take 1 tablet by mouth Every 12 (Twelve) Hours.    We addressed the patient's lifetime risk of breast cancer due to family members and one of them in first-degree relatives.  Her lifetime risk of breast cancer is 22%.  She would be a candidate for yearly MRI.  Patient desires to have the MRI.  MRI was ordered.    Smoking at the patient's most significant risk factor.  " She does seem motivated to quit.  We discussed multiple ways to quit.  She would like to try Wellbutrin.  We discussed the way to take Wellbutrin.  We discussed lowering the seizure threshold and implants on more vivid dreams.  Patient will take for 6 months.  She was also given the one 800 quit now line.    Bone density is ordered since she is postmenopausal and a smoker.  Encouraged the patient to take in adequate calcium and vitamin D.    All questions answered.  Breast self exam technique reviewed and patient encouraged to perform self-exam monthly.  Discussed healthy lifestyle modifications.  Recommended 30 minutes of aerobic exercise five times per week.  Discussed calcium needs to prevent osteoporosis.

## 2018-11-12 LAB
CYTOLOGIST CVX/VAG CYTO: NORMAL
CYTOLOGY CVX/VAG DOC THIN PREP: NORMAL
DX ICD CODE: NORMAL
HIV 1 & 2 AB SER-IMP: NORMAL
HPV I/H RISK 4 DNA CVX QL PROBE+SIG AMP: NEGATIVE
OTHER STN SPEC: NORMAL
PATH REPORT.FINAL DX SPEC: NORMAL
STAT OF ADQ CVX/VAG CYTO-IMP: NORMAL

## 2018-11-13 ENCOUNTER — TELEPHONE (OUTPATIENT)
Dept: OBSTETRICS AND GYNECOLOGY | Age: 53
End: 2018-11-13

## 2018-11-13 NOTE — TELEPHONE ENCOUNTER
----- Message from Tyson Cespedes MD sent at 11/12/2018  3:06 PM EST -----  Please notify pap is normal.

## 2019-01-17 LAB
25(OH)D3+25(OH)D2 SERPL-MCNC: 27.9 NG/ML (ref 30–100)
ALBUMIN SERPL-MCNC: 4.8 G/DL (ref 3.5–5.5)
ALBUMIN/GLOB SERPL: 2.2 {RATIO} (ref 1.2–2.2)
ALP SERPL-CCNC: 120 IU/L (ref 39–117)
ALT SERPL-CCNC: 15 IU/L (ref 0–32)
AST SERPL-CCNC: 16 IU/L (ref 0–40)
BASOPHILS # BLD AUTO: 0 X10E3/UL (ref 0–0.2)
BASOPHILS NFR BLD AUTO: 0 %
BILIRUB SERPL-MCNC: 0.5 MG/DL (ref 0–1.2)
BUN SERPL-MCNC: 14 MG/DL (ref 6–24)
BUN/CREAT SERPL: 19 (ref 9–23)
CALCIUM SERPL-MCNC: 9.5 MG/DL (ref 8.7–10.2)
CHLORIDE SERPL-SCNC: 103 MMOL/L (ref 96–106)
CHOLEST SERPL-MCNC: 128 MG/DL (ref 100–199)
CO2 SERPL-SCNC: 24 MMOL/L (ref 20–29)
CREAT SERPL-MCNC: 0.74 MG/DL (ref 0.57–1)
EOSINOPHIL # BLD AUTO: 0.1 X10E3/UL (ref 0–0.4)
EOSINOPHIL NFR BLD AUTO: 1 %
ERYTHROCYTE [DISTWIDTH] IN BLOOD BY AUTOMATED COUNT: 12.8 % (ref 12.3–15.4)
GLOBULIN SER CALC-MCNC: 2.2 G/DL (ref 1.5–4.5)
GLUCOSE SERPL-MCNC: 95 MG/DL (ref 65–99)
HCT VFR BLD AUTO: 42.1 % (ref 34–46.6)
HDLC SERPL-MCNC: 46 MG/DL
HGB BLD-MCNC: 14.2 G/DL (ref 11.1–15.9)
IMM GRANULOCYTES # BLD AUTO: 0 X10E3/UL (ref 0–0.1)
IMM GRANULOCYTES NFR BLD AUTO: 0 %
LDLC SERPL CALC-MCNC: 61 MG/DL (ref 0–99)
LYMPHOCYTES # BLD AUTO: 1.9 X10E3/UL (ref 0.7–3.1)
LYMPHOCYTES NFR BLD AUTO: 26 %
MCH RBC QN AUTO: 30.1 PG (ref 26.6–33)
MCHC RBC AUTO-ENTMCNC: 33.7 G/DL (ref 31.5–35.7)
MCV RBC AUTO: 89 FL (ref 79–97)
MONOCYTES # BLD AUTO: 0.6 X10E3/UL (ref 0.1–0.9)
MONOCYTES NFR BLD AUTO: 8 %
NEUTROPHILS # BLD AUTO: 4.7 X10E3/UL (ref 1.4–7)
NEUTROPHILS NFR BLD AUTO: 65 %
PLATELET # BLD AUTO: 260 X10E3/UL (ref 150–379)
POTASSIUM SERPL-SCNC: 4.4 MMOL/L (ref 3.5–5.2)
PROT SERPL-MCNC: 7 G/DL (ref 6–8.5)
RBC # BLD AUTO: 4.71 X10E6/UL (ref 3.77–5.28)
SODIUM SERPL-SCNC: 142 MMOL/L (ref 134–144)
T3FREE SERPL-MCNC: 3.6 PG/ML (ref 2–4.4)
T4 FREE SERPL-MCNC: 1.22 NG/DL (ref 0.82–1.77)
TRIGL SERPL-MCNC: 104 MG/DL (ref 0–149)
TSH SERPL DL<=0.005 MIU/L-ACNC: 1.14 UIU/ML (ref 0.45–4.5)
VLDLC SERPL CALC-MCNC: 21 MG/DL (ref 5–40)
WBC # BLD AUTO: 7.3 X10E3/UL (ref 3.4–10.8)

## 2019-02-04 RX ORDER — ATORVASTATIN CALCIUM 20 MG/1
20 TABLET, FILM COATED ORAL DAILY
Qty: 90 TABLET | Refills: 0 | Status: SHIPPED | OUTPATIENT
Start: 2019-02-04 | End: 2019-03-26 | Stop reason: SDUPTHER

## 2019-03-26 ENCOUNTER — OFFICE VISIT (OUTPATIENT)
Dept: FAMILY MEDICINE CLINIC | Facility: CLINIC | Age: 54
End: 2019-03-26

## 2019-03-26 VITALS
TEMPERATURE: 98.4 F | BODY MASS INDEX: 28 KG/M2 | HEIGHT: 64 IN | DIASTOLIC BLOOD PRESSURE: 80 MMHG | HEART RATE: 88 BPM | RESPIRATION RATE: 16 BRPM | WEIGHT: 164 LBS | SYSTOLIC BLOOD PRESSURE: 138 MMHG | OXYGEN SATURATION: 100 %

## 2019-03-26 DIAGNOSIS — K21.9 GASTROESOPHAGEAL REFLUX DISEASE WITHOUT ESOPHAGITIS: ICD-10-CM

## 2019-03-26 DIAGNOSIS — E78.2 MIXED HYPERLIPIDEMIA: ICD-10-CM

## 2019-03-26 DIAGNOSIS — E55.9 VITAMIN D DEFICIENCY: ICD-10-CM

## 2019-03-26 DIAGNOSIS — R09.89 BRUIT OF LEFT CAROTID ARTERY: ICD-10-CM

## 2019-03-26 DIAGNOSIS — Z72.0 TOBACCO ABUSE: Primary | ICD-10-CM

## 2019-03-26 PROCEDURE — 90732 PPSV23 VACC 2 YRS+ SUBQ/IM: CPT | Performed by: PHYSICIAN ASSISTANT

## 2019-03-26 PROCEDURE — 90471 IMMUNIZATION ADMIN: CPT | Performed by: PHYSICIAN ASSISTANT

## 2019-03-26 PROCEDURE — 99214 OFFICE O/P EST MOD 30 MIN: CPT | Performed by: PHYSICIAN ASSISTANT

## 2019-03-26 RX ORDER — ATORVASTATIN CALCIUM 20 MG/1
20 TABLET, FILM COATED ORAL DAILY
Qty: 90 TABLET | Refills: 3 | Status: SHIPPED | OUTPATIENT
Start: 2019-03-26 | End: 2020-05-15

## 2019-03-26 RX ORDER — RANITIDINE 150 MG/1
150 TABLET ORAL 2 TIMES DAILY
Qty: 180 TABLET | Refills: 3 | Status: SHIPPED | OUTPATIENT
Start: 2019-03-26 | End: 2019-09-11

## 2019-03-26 RX ORDER — CHOLECALCIFEROL (VITAMIN D3) 50 MCG
TABLET ORAL
Qty: 60 TABLET | Refills: 11
Start: 2019-03-26

## 2019-03-26 RX ORDER — ATORVASTATIN CALCIUM 20 MG/1
20 TABLET, FILM COATED ORAL DAILY
Qty: 90 TABLET | Refills: 0 | Status: SHIPPED | OUTPATIENT
Start: 2019-03-26 | End: 2019-03-26 | Stop reason: SDUPTHER

## 2019-03-26 NOTE — PROGRESS NOTES
Subjective   Anabella Baum is a 54 y.o. female.     History of Present Illness     Anabella Baum 54 y.o. female who presents today for routine follow up check and medication refills.  she has a history of   Patient Active Problem List   Diagnosis   • Allergic reaction   • Carotid bruit   • GERD (gastroesophageal reflux disease)   • HLD (hyperlipidemia)   • CTS (carpal tunnel syndrome)   • Rash, skin   • Vitamin D deficiency   • Dyshidrotic eczema   • Tobacco abuse   • Family history of breast cancer gene mutation in first degree relative   .  Since the last visit, she has overall felt well.  She has GERD and is well controlled on H2 blocker, Hyperlipidemia and is well controlled on medication and Vitamin D deficiency and will update labs to confirm level is at goal >30.  she has been compliant with current medications have reviewed them.  The patient denies medication side effects.  Weight up and needs to Stop smoking and exercise  I will update Tdap and do pneumovax  Right knee pain to kneel and see ortho  Get the carotid u/s!!!  Results for orders placed or performed in visit on 11/08/18   POC Urinalysis Dipstick   Result Value Ref Range    Color Yellow Yellow, Straw, Dark Yellow, Vanna    Clarity, UA Clear Clear    Glucose, UA Negative Negative, 1000 mg/dL (3+) mg/dL    Bilirubin Negative Negative    Ketones, UA Negative Negative    Specific Gravity  1.020 1.005 - 1.030    Blood, UA Small (A) Negative    pH, Urine 5.5 5.0 - 8.0    Protein, POC Negative Negative mg/dL    Urobilinogen, UA Normal Normal    Leukocytes Negative Negative    Nitrite, UA Negative Negative   IGP, Aptima HPV, Rfx 16 / 18,45   Result Value Ref Range    Diagnosis Comment     Specimen adequacy: Comment     Clinician Provided ICD-10: Comment     Performed by: Comment     . .     Note: Comment     Method: Comment     HPV Aptima Negative Negative     Lab Results   Component Value Date    CHLPL 128 01/16/2019    TRIG 104 01/16/2019    HDL 46  01/16/2019    LDL 61 01/16/2019     Lab Results   Component Value Date    GLUCOSE 72 08/02/2017    BUN 14 01/16/2019    CREATININE 0.74 01/16/2019    EGFRIFNONA 91 01/16/2019    EGFRIFAFRI 105 01/16/2019    BCR 19 01/16/2019    K 4.4 01/16/2019    CO2 24 01/16/2019    CALCIUM 9.5 01/16/2019    PROTENTOTREF 7.0 01/16/2019    ALBUMIN 4.8 01/16/2019    LABIL2 2.2 01/16/2019    AST 16 01/16/2019    ALT 15 01/16/2019     Lab Results   Component Value Date    WBC 7.3 01/16/2019    HGB 14.2 01/16/2019    HCT 42.1 01/16/2019    MCV 89 01/16/2019     01/16/2019         Needs carotid u/s update and last done 2013 with mild disease; bruit on left  Aunt and mom with thyroid  Mom and sis breast cancer   Low-Dose Lung Cancer CT Screening Visit                                                                                                                                     Shared Decision Making  I am discussing tobacco cessation today with Anabella Baum    SMOKING HISTORY:   Social History     Tobacco Use   Smoking Status Current Every Day Smoker   • Packs/day: 1.00   • Years: 35.00   • Pack years: 35.00   Smokeless Tobacco Never Used   Tobacco Comment    rash with e cig       Anabella Baum is currently smoking 35 pack(s) per day with a 1 pack year history.  Reports no use of alternate forms of tobacco, electronic cigarettes, marijuana or other substances.  Based on the recommendation of the [place] Preventive Services Task Force, this patient is at high risk for lung cancer and a low-dose CT screening scan is recommended.     We discussed the connection between Radon and Lung Cancer and the availability of free test kits.  The patient reports exposure to second hand smoke.  Some second-hand smoke exposure as a child with Parents smoking in their home.    The patient has had no hemoptysis, unintentional weight loss or increasing shortness of breath. The patient is asymptomatic and has no signs or symptoms of lung  cancer.     Together we discussed the potential benefits and potential harms of being screened for lung cancer including the potential for follow up diagnostic testing, risk for over diagnosis, false positive rate and radiation exposure using the [facility] standardized decision aid. We reviewed the patient's smoking history and counseled on the importance and health benefits of quitting smoking.      Chest: Lung sounds are clear to auscultation, no wheezes, rales or rhonchi, with symmetric air entry.  Oxygen saturation was measured today and reported in vital signs.     Smoking Cessation  DISCUSSION HELD TODAY:     We discussed that there are a number of resources and interventions to assist with smoking cessation including the 1-800-Quit Now line, Health Department programs, [place] [facility] resources, and use of the [place] Department of Health and Human Services five keys for quitting and quit plan worksheet.  On a scale of zero to ten, the patient rates their motivation and readiness to quit at a 4 out of 10 today.      Recommendations for continued lung cancer screening:      We discussed the NCCN guidelines for lung cancer screening and the patient verbalized understanding that annual screening is recommended until fifteen years beyond smoking as long as they have no other disease or comorbidity that would prevent them from receiving cancer treatments such as surgery should a lung cancer be detected. The [facility] Lung Cancer Screening Shared Decision-Making Tool was utilized as an aid in discussing whether or not screening was right. The patient has decided to proceed with a Low Dose Lung Cancer Screening CT today. The patient is aware that the results of his screening will be shared with the referring provider or PCP as well.       The patient verbalizes understanding that results of this low dose lung CT exam will be called and that assistance will be provided in arranging any  necessary follow-up.    After review of the NCCN guidelines and recommendations for ongoing screening, the patient verbalized understanding of recommendations for follow-up. We discussed the importance of continued annual screening until 15 years beyond smoking or until other life-limiting comorbidities are present. We discussed the impact of comorbidities and ability and willing to undergo diagnosis and treatment.    2 minutes out of 4  minutes face-to-face spent counseling patient on the health benefits of quitting tobacco, smoking cessation strategies and resources, as well as the importance of adherence to annual lung cancer low-dose CT screening.       Has not started Wellbutrin    The following portions of the patient's history were reviewed and updated as appropriate: allergies, current medications, past family history, past medical history, past social history, past surgical history and problem list.    Review of Systems   Constitutional: Negative for activity change, appetite change and unexpected weight change.   HENT: Negative for nosebleeds and trouble swallowing.    Eyes: Negative for pain and visual disturbance.   Respiratory: Negative for chest tightness, shortness of breath and wheezing.    Cardiovascular: Negative for chest pain and palpitations.   Gastrointestinal: Negative for abdominal pain and blood in stool.   Endocrine: Negative.    Genitourinary: Negative for difficulty urinating and hematuria.   Musculoskeletal: Negative for joint swelling.   Skin: Negative for color change and rash.   Allergic/Immunologic: Negative.    Neurological: Negative for syncope and speech difficulty.   Hematological: Negative for adenopathy.   Psychiatric/Behavioral: Negative for agitation and confusion.   All other systems reviewed and are negative.      Objective   Physical Exam   Constitutional: She is oriented to person, place, and time. She appears well-developed and well-nourished. No distress.   HENT:    Head: Normocephalic and atraumatic.   Eyes: Conjunctivae and EOM are normal. Pupils are equal, round, and reactive to light. Right eye exhibits no discharge. Left eye exhibits no discharge. No scleral icterus.   Neck: Normal range of motion. Neck supple. No tracheal deviation present. No thyromegaly present.   Cardiovascular: Normal rate, regular rhythm, normal heart sounds, intact distal pulses and normal pulses. Exam reveals no gallop.   No murmur heard.  Bruit left carotid   Pulmonary/Chest: Effort normal and breath sounds normal. No respiratory distress. She has no wheezes. She has no rales.   Musculoskeletal: Normal range of motion. She exhibits no tenderness.   Shoulder pain with ROM  Slight trapezius soreness with ROM and touch    Lymphadenopathy:     She has no cervical adenopathy.   Neurological: She is alert and oriented to person, place, and time. No sensory deficit. She exhibits normal muscle tone. Coordination normal.   Skin: Skin is warm. No erythema. No pallor.   Psychiatric: She has a normal mood and affect. Her behavior is normal. Judgment and thought content normal.   Nursing note and vitals reviewed.      Assessment/Plan   There are no diagnoses linked to this encounter.  Get low dose CT chest d/t smoking  Refill meds.    In summary, Anabella MARAVILLA Bautista, was seen today.  she was seen for  GERD and is well controlled on H2 blocker, Hyperlipidemia and is well controlled on medication and Vitamin D deficiency and will update labs to confirm level is at goal >30,  Overdue for carotid doppler; bruit left   Watching bp; weight is up---diet and exercise; Stop smoking

## 2019-03-26 NOTE — PATIENT INSTRUCTIONS
Low glycemic index diet  Exercise 30 minutes most days of the week  Make sure you get results on any labs or tests we ordered today  We discussed medications and how to take them as prescribed  Sleep 6-8 hours each night if possible  If you have not signed up for [employer], please activate your code ASAP from your After Visit Summary today    LDL goal <100  LDL goal if heart disease <70  HDL goal >60  Triglyceride goal <150  BP goal =<130/80  Fasting glucose <100

## 2019-09-11 ENCOUNTER — TELEPHONE (OUTPATIENT)
Dept: FAMILY MEDICINE CLINIC | Facility: CLINIC | Age: 54
End: 2019-09-11

## 2019-09-11 RX ORDER — LANSOPRAZOLE 30 MG/1
30 CAPSULE, DELAYED RELEASE ORAL DAILY
Qty: 90 CAPSULE | Refills: 3 | Status: SHIPPED | OUTPATIENT
Start: 2019-09-11 | End: 2019-09-15

## 2019-09-15 RX ORDER — LANSOPRAZOLE 30 MG/1
CAPSULE, DELAYED RELEASE ORAL
Qty: 90 CAPSULE | Refills: 0 | Status: SHIPPED | OUTPATIENT
Start: 2019-09-15 | End: 2019-10-09 | Stop reason: SDUPTHER

## 2019-10-09 ENCOUNTER — OFFICE VISIT (OUTPATIENT)
Dept: FAMILY MEDICINE CLINIC | Facility: CLINIC | Age: 54
End: 2019-10-09

## 2019-10-09 VITALS
HEART RATE: 101 BPM | OXYGEN SATURATION: 100 % | DIASTOLIC BLOOD PRESSURE: 90 MMHG | RESPIRATION RATE: 16 BRPM | TEMPERATURE: 98.4 F | BODY MASS INDEX: 28 KG/M2 | HEIGHT: 64 IN | WEIGHT: 164 LBS | SYSTOLIC BLOOD PRESSURE: 156 MMHG

## 2019-10-09 DIAGNOSIS — I10 BENIGN ESSENTIAL HTN: ICD-10-CM

## 2019-10-09 DIAGNOSIS — E78.2 MIXED HYPERLIPIDEMIA: ICD-10-CM

## 2019-10-09 DIAGNOSIS — Z72.0 TOBACCO ABUSE: ICD-10-CM

## 2019-10-09 DIAGNOSIS — Z12.31 ENCOUNTER FOR SCREENING MAMMOGRAM FOR BREAST CANCER: ICD-10-CM

## 2019-10-09 DIAGNOSIS — E55.9 VITAMIN D DEFICIENCY: ICD-10-CM

## 2019-10-09 DIAGNOSIS — Z23 IMMUNIZATION DUE: Primary | ICD-10-CM

## 2019-10-09 DIAGNOSIS — R09.89 BRUIT OF LEFT CAROTID ARTERY: ICD-10-CM

## 2019-10-09 DIAGNOSIS — K21.9 GASTROESOPHAGEAL REFLUX DISEASE WITHOUT ESOPHAGITIS: ICD-10-CM

## 2019-10-09 PROCEDURE — 90471 IMMUNIZATION ADMIN: CPT | Performed by: PHYSICIAN ASSISTANT

## 2019-10-09 PROCEDURE — 90674 CCIIV4 VAC NO PRSV 0.5 ML IM: CPT | Performed by: PHYSICIAN ASSISTANT

## 2019-10-09 PROCEDURE — 99214 OFFICE O/P EST MOD 30 MIN: CPT | Performed by: PHYSICIAN ASSISTANT

## 2019-10-09 RX ORDER — METHYLPREDNISOLONE 4 MG/1
TABLET ORAL
Qty: 21 TABLET | Refills: 0 | Status: SHIPPED | OUTPATIENT
Start: 2019-10-09 | End: 2019-10-30

## 2019-10-09 RX ORDER — LANSOPRAZOLE 30 MG/1
30 CAPSULE, DELAYED RELEASE ORAL DAILY
Qty: 90 CAPSULE | Refills: 3 | Status: SHIPPED | OUTPATIENT
Start: 2019-10-09 | End: 2019-12-23

## 2019-10-09 RX ORDER — LISINOPRIL 5 MG/1
5 TABLET ORAL DAILY
Qty: 90 TABLET | Refills: 0 | Status: SHIPPED | OUTPATIENT
Start: 2019-10-09 | End: 2019-10-30 | Stop reason: SDUPTHER

## 2019-10-09 NOTE — PROGRESS NOTES
"Subjective   Anabella Baum is a 54 y.o. female.     History of Present Illness   Patient complains of left deltoid pain. The symptoms began several weeks ago.  Pain is a result of no known event. Pain is located deltoid region. Discomfort is described as soreness. Symptoms are exacerbated by movement and not all ROM; shoulder, arm ROM.  Evaluation to date: none. Therapy to date includes: nothing specific  No N/T, no burning; ROM r/t---but not every time.  This is in deltoid area; deep throb; can last for 30-60 minutes. Allergy to NSAIDs; will try oral pred; if no help; refer ortho    I will have her try Medrol for now     Since the last visit, she has overall felt fairly well.  She has New diagnosis today of Essential Hypertension and will start medication, GERD controlled on PPI Rx, Hyperlipidemia with goals met with current Rx, Seasonal allergies and doing well on their medication  and Vitamin D deficiency and will update labs for continued management.  she has been compliant with current medications have reviewed them.  The patient denies medication side effects.  Will refill medications. /90 (BP Location: Left arm, Patient Position: Sitting, Cuff Size: Large Adult)   Pulse 101   Temp 98.4 °F (36.9 °C) (Oral)   Resp 16   Ht 162.6 cm (64\")   Wt 74.4 kg (164 lb)   LMP  (LMP Unknown)   SpO2 100%   BMI 28.15 kg/m²     Results for orders placed or performed in visit on 11/08/18   POC Urinalysis Dipstick   Result Value Ref Range    Color Yellow Yellow, Straw, Dark Yellow, Vanna    Clarity, UA Clear Clear    Glucose, UA Negative Negative, 1000 mg/dL (3+) mg/dL    Bilirubin Negative Negative    Ketones, UA Negative Negative    Specific Gravity  1.020 1.005 - 1.030    Blood, UA Small (A) Negative    pH, Urine 5.5 5.0 - 8.0    Protein, POC Negative Negative mg/dL    Urobilinogen, UA Normal Normal    Leukocytes Negative Negative    Nitrite, UA Negative Negative   IGP, Aptima HPV, Rfx 16 / 18,45   Result Value " Ref Range    Diagnosis Comment     Specimen adequacy: Comment     Clinician Provided ICD-10: Comment     Performed by: Comment     . .     Note: Comment     Method: Comment     HPV Aptima Negative Negative         The following portions of the patient's history were reviewed and updated as appropriate: allergies, current medications, past family history, past medical history, past social history, past surgical history and problem list.    Review of Systems   Constitutional: Negative for activity change, appetite change and unexpected weight change.   HENT: Positive for congestion. Negative for nosebleeds and trouble swallowing.    Eyes: Negative for pain and visual disturbance.   Respiratory: Positive for cough. Negative for chest tightness, shortness of breath and wheezing.    Cardiovascular: Negative for chest pain and palpitations.   Gastrointestinal: Negative for abdominal pain and blood in stool.   Endocrine: Negative.    Genitourinary: Negative for difficulty urinating and hematuria.   Musculoskeletal: Negative for joint swelling.   Skin: Negative for color change and rash.   Allergic/Immunologic: Positive for environmental allergies.   Neurological: Negative for syncope and speech difficulty.   Hematological: Negative for adenopathy.   Psychiatric/Behavioral: Negative for agitation and confusion.   All other systems reviewed and are negative.      Objective   Physical Exam   Constitutional: She is oriented to person, place, and time. She appears well-developed and well-nourished. No distress.   HENT:   Head: Normocephalic and atraumatic.   Eyes: Conjunctivae and EOM are normal. Pupils are equal, round, and reactive to light. Right eye exhibits no discharge. Left eye exhibits no discharge. No scleral icterus.   Neck: Normal range of motion. Neck supple. No tracheal deviation present. No thyromegaly present.   Cardiovascular: Normal rate, regular rhythm, normal heart sounds, intact distal pulses and normal  pulses. Exam reveals no gallop.   No murmur heard.  Pulmonary/Chest: Effort normal and breath sounds normal. No respiratory distress. She has no wheezes. She has no rales.   Musculoskeletal: Normal range of motion. She exhibits no edema, tenderness or deformity.   No pain now on exam; ROM intact   Neurological: She is alert and oriented to person, place, and time. No sensory deficit. She exhibits normal muscle tone. Coordination normal.   Skin: Skin is warm. No rash noted. No erythema. No pallor.   Psychiatric: She has a normal mood and affect. Her behavior is normal. Judgment and thought content normal.   Nursing note and vitals reviewed.      Assessment/Plan   Anabella was seen today for arm pain.    Diagnoses and all orders for this visit:    Immunization due  -     Flucelvax Quad=>4Years (PFS)    Encounter for screening mammogram for breast cancer  -     Mammo Screening Bilateral With CAD; Future    Benign essential HTN    Gastroesophageal reflux disease without esophagitis    Mixed hyperlipidemia    Vitamin D deficiency    Other orders  -     lansoprazole (PREVACID) 30 MG capsule; Take 1 capsule by mouth Daily. for GERD      Bruit left carotid; need doppler  Try oral pred; see ortho if no help  Plan, Anabella Baum, was seen today.  she was seen for HTN and will start medication, GERD and will continue on PPI medication, Hyperlipidemia and will continue current medication, Seasonal allergies and is doing well on their medication PRN and Vitamin D deficiency and will update labs .  Stop smoking

## 2019-10-09 NOTE — PATIENT INSTRUCTIONS

## 2019-10-16 ENCOUNTER — TRANSCRIBE ORDERS (OUTPATIENT)
Dept: ADMINISTRATIVE | Facility: HOSPITAL | Age: 54
End: 2019-10-16

## 2019-10-16 DIAGNOSIS — Z12.31 SCREENING MAMMOGRAM, ENCOUNTER FOR: Primary | ICD-10-CM

## 2019-10-30 ENCOUNTER — OFFICE VISIT (OUTPATIENT)
Dept: FAMILY MEDICINE CLINIC | Facility: CLINIC | Age: 54
End: 2019-10-30

## 2019-10-30 ENCOUNTER — APPOINTMENT (OUTPATIENT)
Dept: MAMMOGRAPHY | Facility: HOSPITAL | Age: 54
End: 2019-10-30

## 2019-10-30 ENCOUNTER — APPOINTMENT (OUTPATIENT)
Dept: CARDIOLOGY | Facility: HOSPITAL | Age: 54
End: 2019-10-30

## 2019-10-30 VITALS
SYSTOLIC BLOOD PRESSURE: 122 MMHG | WEIGHT: 164 LBS | BODY MASS INDEX: 28 KG/M2 | HEART RATE: 112 BPM | HEIGHT: 64 IN | DIASTOLIC BLOOD PRESSURE: 80 MMHG | OXYGEN SATURATION: 98 % | RESPIRATION RATE: 16 BRPM | TEMPERATURE: 98.1 F

## 2019-10-30 DIAGNOSIS — Z72.0 TOBACCO ABUSE: ICD-10-CM

## 2019-10-30 DIAGNOSIS — E78.2 MIXED HYPERLIPIDEMIA: ICD-10-CM

## 2019-10-30 DIAGNOSIS — E55.9 VITAMIN D DEFICIENCY: ICD-10-CM

## 2019-10-30 DIAGNOSIS — Z00.00 LABORATORY EXAMINATION ORDERED AS PART OF A ROUTINE GENERAL MEDICAL EXAMINATION: ICD-10-CM

## 2019-10-30 DIAGNOSIS — K21.9 GASTROESOPHAGEAL REFLUX DISEASE WITHOUT ESOPHAGITIS: ICD-10-CM

## 2019-10-30 DIAGNOSIS — R09.89 BRUIT OF LEFT CAROTID ARTERY: ICD-10-CM

## 2019-10-30 DIAGNOSIS — J01.90 ACUTE SINUSITIS, RECURRENCE NOT SPECIFIED, UNSPECIFIED LOCATION: ICD-10-CM

## 2019-10-30 DIAGNOSIS — I10 BENIGN ESSENTIAL HTN: Primary | ICD-10-CM

## 2019-10-30 PROCEDURE — 99214 OFFICE O/P EST MOD 30 MIN: CPT | Performed by: PHYSICIAN ASSISTANT

## 2019-10-30 RX ORDER — METHYLPREDNISOLONE 4 MG/1
TABLET ORAL
Qty: 21 TABLET | Refills: 0 | Status: SHIPPED | OUTPATIENT
Start: 2019-10-30 | End: 2020-03-19

## 2019-10-30 RX ORDER — AZITHROMYCIN 250 MG/1
TABLET, FILM COATED ORAL
Qty: 6 TABLET | Refills: 1 | Status: SHIPPED | OUTPATIENT
Start: 2019-10-30 | End: 2020-03-19

## 2019-10-30 RX ORDER — DEXTROMETHORPHAN HYDROBROMIDE AND PROMETHAZINE HYDROCHLORIDE 15; 6.25 MG/5ML; MG/5ML
5 SYRUP ORAL 4 TIMES DAILY PRN
Qty: 180 ML | Refills: 1 | Status: SHIPPED | OUTPATIENT
Start: 2019-10-30 | End: 2020-03-19

## 2019-10-30 RX ORDER — LISINOPRIL 5 MG/1
5 TABLET ORAL DAILY
Qty: 90 TABLET | Refills: 1 | Status: SHIPPED | OUTPATIENT
Start: 2019-10-30 | End: 2020-08-20 | Stop reason: SDUPTHER

## 2019-10-30 NOTE — PROGRESS NOTES
"Minal Baum is a 54 y.o. female.     History of Present Illness    Since the last visit, she has overall felt well until recent illness.  She has GERD controlled on PPI Rx, Hyperlipidemia and working on this with diet and exercise, Vitamin D deficiency and will update labs for continued management and HTN and bp check.  she has been compliant with current medications have reviewed them.  The patient denies medication side effects.  Will refill medications. /90 (BP Location: Left arm, Patient Position: Sitting, Cuff Size: Large Adult)   Pulse 112   Temp 98.1 °F (36.7 °C) (Oral)   Resp 16   Ht 162.6 cm (64\")   Wt 74.4 kg (164 lb)   LMP  (LMP Unknown)   SpO2 98%   BMI 28.15 kg/m²     Results for orders placed or performed in visit on 11/08/18   POC Urinalysis Dipstick   Result Value Ref Range    Color Yellow Yellow, Straw, Dark Yellow, Vanna    Clarity, UA Clear Clear    Glucose, UA Negative Negative, 1000 mg/dL (3+) mg/dL    Bilirubin Negative Negative    Ketones, UA Negative Negative    Specific Gravity  1.020 1.005 - 1.030    Blood, UA Small (A) Negative    pH, Urine 5.5 5.0 - 8.0    Protein, POC Negative Negative mg/dL    Urobilinogen, UA Normal Normal    Leukocytes Negative Negative    Nitrite, UA Negative Negative   IGP, Aptima HPV, Rfx 16 / 18,45   Result Value Ref Range    Diagnosis Comment     Specimen adequacy: Comment     Clinician Provided ICD-10: Comment     Performed by: Comment     . .     Note: Comment     Method: Comment     HPV Aptima Negative Negative      failed H2;  Labs due Castro Baum 54 y.o. female who presents for evaluation of upper respiratory congestion. Symptoms include ear pressure, sore throat, nasal blockage, post nasal drip, cough described as productive of brown sputum, fatigue and runny nose.  Onset of symptoms was 8 days ago, gradually worsening since that time. Patient denies shortness of breath.   Evaluation to date: none Treatment to date:  OTC " cough suppressant.     The following portions of the patient's history were reviewed and updated as appropriate: allergies, current medications, past family history, past medical history, past social history, past surgical history and problem list.    Review of Systems   Constitutional: Positive for fatigue. Negative for activity change and unexpected weight change.   HENT: Positive for congestion, postnasal drip, rhinorrhea, sinus pressure, sinus pain and sore throat. Negative for ear pain.    Eyes: Negative for pain and discharge.   Respiratory: Positive for cough. Negative for chest tightness, shortness of breath and wheezing.    Cardiovascular: Negative for chest pain and palpitations.   Gastrointestinal: Negative for abdominal pain, diarrhea and vomiting.   Endocrine: Negative.    Genitourinary: Negative.    Musculoskeletal: Negative for joint swelling.   Skin: Negative for color change, rash and wound.   Allergic/Immunologic: Negative.    Neurological: Negative for seizures and syncope.   Psychiatric/Behavioral: Negative.        Objective   Physical Exam   Constitutional: She is oriented to person, place, and time. She appears well-developed and well-nourished.  Non-toxic appearance. No distress.   HENT:   Head: Normocephalic and atraumatic. Hair is normal.   Right Ear: External ear normal. No drainage, swelling or tenderness. Tympanic membrane is retracted.   Left Ear: External ear normal. No drainage, swelling or tenderness. Tympanic membrane is retracted.   Nose: Mucosal edema present. No epistaxis.   Mouth/Throat: Uvula is midline and mucous membranes are normal. No oral lesions. No uvula swelling. Posterior oropharyngeal erythema present. No oropharyngeal exudate.   Eyes: Conjunctivae and EOM are normal. Pupils are equal, round, and reactive to light. Right eye exhibits no discharge. Left eye exhibits no discharge. No scleral icterus.   Neck: Normal range of motion. Neck supple.   Cardiovascular: Normal  rate, regular rhythm and normal heart sounds. Exam reveals no gallop.   No murmur heard.  Left bruit   Pulmonary/Chest: Breath sounds normal. No stridor. No respiratory distress. She has no wheezes. She has no rales. She exhibits no tenderness.   Lymphadenopathy:     She has cervical adenopathy.   Neurological: She is alert and oriented to person, place, and time. She exhibits normal muscle tone.   Skin: Skin is warm and dry. No rash noted. She is not diaphoretic.   Psychiatric: She has a normal mood and affect. Her behavior is normal. Judgment and thought content normal.   Nursing note and vitals reviewed.      Assessment/Plan   Anabella was seen today for hypertension.    Diagnoses and all orders for this visit:    Benign essential HTN    Bruit of left carotid artery    Gastroesophageal reflux disease without esophagitis    Mixed hyperlipidemia    Acute sinusitis, recurrence not specified, unspecified location    Other orders  -     azithromycin (ZITHROMAX) 250 MG tablet; Take 2 tablets the first day, then 1 tablet daily for 4 days for infection  -     methylPREDNISolone (MEDROL, PARRISH,) 4 MG tablet; follow package directions  -     promethazine-dextromethorphan (PROMETHAZINE-DM) 6.25-15 MG/5ML syrup; Take 5 mL by mouth 4 (Four) Times a Day As Needed for Cough.  -     lisinopril (PRINIVIL,ZESTRIL) 5 MG tablet; Take 1 tablet by mouth Daily. For BP      Refill bp med; working at 5mg  Bruit left carotid; need doppler----she will do Castro  Medrol if bronchitis; start Zpak and med for cough  Cont statin and PPI

## 2019-12-23 RX ORDER — LANSOPRAZOLE 30 MG/1
CAPSULE, DELAYED RELEASE ORAL
Qty: 90 CAPSULE | Refills: 3 | Status: SHIPPED | OUTPATIENT
Start: 2019-12-23 | End: 2020-10-27 | Stop reason: SDUPTHER

## 2020-01-01 ENCOUNTER — RESULTS ENCOUNTER (OUTPATIENT)
Dept: FAMILY MEDICINE CLINIC | Facility: CLINIC | Age: 55
End: 2020-01-01

## 2020-01-01 DIAGNOSIS — I10 BENIGN ESSENTIAL HTN: ICD-10-CM

## 2020-01-01 DIAGNOSIS — Z72.0 TOBACCO ABUSE: ICD-10-CM

## 2020-01-01 DIAGNOSIS — R09.89 BRUIT OF LEFT CAROTID ARTERY: ICD-10-CM

## 2020-01-01 DIAGNOSIS — E55.9 VITAMIN D DEFICIENCY: ICD-10-CM

## 2020-01-01 DIAGNOSIS — K21.9 GASTROESOPHAGEAL REFLUX DISEASE WITHOUT ESOPHAGITIS: ICD-10-CM

## 2020-01-01 DIAGNOSIS — J01.90 ACUTE SINUSITIS, RECURRENCE NOT SPECIFIED, UNSPECIFIED LOCATION: ICD-10-CM

## 2020-01-01 DIAGNOSIS — Z00.00 LABORATORY EXAMINATION ORDERED AS PART OF A ROUTINE GENERAL MEDICAL EXAMINATION: ICD-10-CM

## 2020-01-01 DIAGNOSIS — E78.2 MIXED HYPERLIPIDEMIA: ICD-10-CM

## 2020-02-19 ENCOUNTER — HOSPITAL ENCOUNTER (OUTPATIENT)
Dept: CARDIOLOGY | Facility: HOSPITAL | Age: 55
Discharge: HOME | End: 2020-02-19

## 2020-02-19 ENCOUNTER — HOSPITAL ENCOUNTER (OUTPATIENT)
Dept: MAMMOGRAPHY | Facility: HOSPITAL | Age: 55
Discharge: HOME | End: 2020-02-19
Admitting: PHYSICIAN ASSISTANT

## 2020-02-19 DIAGNOSIS — I65.22 ATHEROSCLEROSIS OF LEFT CAROTID ARTERY: Primary | ICD-10-CM

## 2020-02-19 DIAGNOSIS — Z12.31 SCREENING MAMMOGRAM, ENCOUNTER FOR: ICD-10-CM

## 2020-02-19 DIAGNOSIS — R09.89 BRUIT OF LEFT CAROTID ARTERY: ICD-10-CM

## 2020-02-19 LAB
BH CV XLRA MEAS LEFT DIST CCA EDV: -26.9 CM/SEC
BH CV XLRA MEAS LEFT DIST CCA PSV: -72.8 CM/SEC
BH CV XLRA MEAS LEFT DIST ICA EDV: -32.9 CM/SEC
BH CV XLRA MEAS LEFT DIST ICA PSV: -90.1 CM/SEC
BH CV XLRA MEAS LEFT ICA/CCA RATIO: 1.4
BH CV XLRA MEAS LEFT MID ICA EDV: -34.7 CM/SEC
BH CV XLRA MEAS LEFT MID ICA PSV: -100.5 CM/SEC
BH CV XLRA MEAS LEFT PROX CCA EDV: 26.9 CM/SEC
BH CV XLRA MEAS LEFT PROX CCA PSV: 101 CM/SEC
BH CV XLRA MEAS LEFT PROX ECA EDV: -14.7 CM/SEC
BH CV XLRA MEAS LEFT PROX ECA PSV: -85.2 CM/SEC
BH CV XLRA MEAS LEFT PROX ICA EDV: -37.2 CM/SEC
BH CV XLRA MEAS LEFT PROX ICA PSV: -96.8 CM/SEC
BH CV XLRA MEAS LEFT PROX SCLA PSV: 129.1 CM/SEC
BH CV XLRA MEAS LEFT VERTEBRAL A EDV: -8.1 CM/SEC
BH CV XLRA MEAS LEFT VERTEBRAL A PSV: -36.7 CM/SEC
BH CV XLRA MEAS RIGHT DIST CCA EDV: -21.1 CM/SEC
BH CV XLRA MEAS RIGHT DIST CCA PSV: -65.9 CM/SEC
BH CV XLRA MEAS RIGHT DIST ICA EDV: -37.9 CM/SEC
BH CV XLRA MEAS RIGHT DIST ICA PSV: -93.2 CM/SEC
BH CV XLRA MEAS RIGHT ICA/CCA RATIO: 1.4
BH CV XLRA MEAS RIGHT MID ICA EDV: -34.8 CM/SEC
BH CV XLRA MEAS RIGHT MID ICA PSV: -83.9 CM/SEC
BH CV XLRA MEAS RIGHT PROX CCA EDV: 15.5 CM/SEC
BH CV XLRA MEAS RIGHT PROX CCA PSV: 72.1 CM/SEC
BH CV XLRA MEAS RIGHT PROX ECA EDV: -11.2 CM/SEC
BH CV XLRA MEAS RIGHT PROX ECA PSV: -92.6 CM/SEC
BH CV XLRA MEAS RIGHT PROX ICA EDV: -28 CM/SEC
BH CV XLRA MEAS RIGHT PROX ICA PSV: -74.6 CM/SEC
BH CV XLRA MEAS RIGHT PROX SCLA PSV: 142.1 CM/SEC
BH CV XLRA MEAS RIGHT VERTEBRAL A EDV: -21.7 CM/SEC
BH CV XLRA MEAS RIGHT VERTEBRAL A PSV: -61.5 CM/SEC
LEFT ARM BP: NORMAL MMHG
RIGHT ARM BP: NORMAL MMHG

## 2020-02-19 PROCEDURE — 93880 EXTRACRANIAL BILAT STUDY: CPT

## 2020-02-19 PROCEDURE — 77063 BREAST TOMOSYNTHESIS BI: CPT

## 2020-02-19 PROCEDURE — 77067 SCR MAMMO BI INCL CAD: CPT

## 2020-03-19 ENCOUNTER — OFFICE VISIT (OUTPATIENT)
Dept: FAMILY MEDICINE CLINIC | Facility: CLINIC | Age: 55
End: 2020-03-19

## 2020-03-19 VITALS
HEIGHT: 64 IN | SYSTOLIC BLOOD PRESSURE: 120 MMHG | BODY MASS INDEX: 28 KG/M2 | HEART RATE: 93 BPM | DIASTOLIC BLOOD PRESSURE: 74 MMHG | OXYGEN SATURATION: 100 % | TEMPERATURE: 97.8 F | RESPIRATION RATE: 16 BRPM | WEIGHT: 164 LBS

## 2020-03-19 DIAGNOSIS — J20.9 ACUTE BRONCHITIS DUE TO INFECTION: Primary | ICD-10-CM

## 2020-03-19 PROCEDURE — 99213 OFFICE O/P EST LOW 20 MIN: CPT | Performed by: PHYSICIAN ASSISTANT

## 2020-03-19 PROCEDURE — 71046 X-RAY EXAM CHEST 2 VIEWS: CPT | Performed by: PHYSICIAN ASSISTANT

## 2020-03-19 RX ORDER — AZITHROMYCIN 250 MG/1
TABLET, FILM COATED ORAL
Qty: 6 TABLET | Refills: 1 | Status: SHIPPED | OUTPATIENT
Start: 2020-03-19 | End: 2020-07-31

## 2020-03-19 RX ORDER — METHYLPREDNISOLONE 4 MG/1
TABLET ORAL
Qty: 21 TABLET | Refills: 0 | Status: SHIPPED | OUTPATIENT
Start: 2020-03-19 | End: 2020-07-31

## 2020-03-19 NOTE — PROGRESS NOTES
Subjective   Anabella Baum is a 55 y.o. female.     History of Present Illness   Anabella Baum 55 y.o. female who presents for evaluation of acute bronchitis, cough. Symptoms include headache, shortness of breath, fatigue and chest tightness.  Onset of symptoms was a few days ago, gradually worsening since that time. Patient denies fever.   Evaluation to date: none Treatment to date:  Aspirin.   Onset Tues; dry cough;   X-Ray  Interpretation report in house X-rays that I personally viewed    Relevant Clinical Issues/Diagnoses/Indications:  Cough; smoker        Clinical Findings: Slightly more prominent markings in the hilar area otherwise clear lungs, no mass normal heart size, no infiltrate          Comparative Data:  yes          Date of Previous X-ray:  7-19-17  Change on current X-ray: Little more prominent hilar area    The following portions of the patient's history were reviewed and updated as appropriate: allergies, current medications, past family history, past medical history, past social history, past surgical history and problem list.    Review of Systems   Constitutional: Negative for fever.   HENT: Positive for congestion. Negative for ear pain, rhinorrhea and sore throat.    Respiratory: Positive for cough, chest tightness and shortness of breath. Negative for wheezing.    Cardiovascular: Negative for chest pain and leg swelling.   Gastrointestinal: Negative for abdominal pain and vomiting.   Musculoskeletal: Negative for neck pain.   Skin: Negative for rash.   Allergic/Immunologic: Positive for environmental allergies.   Neurological: Positive for headaches.       Objective   Physical Exam   Constitutional: She is oriented to person, place, and time. She appears well-developed and well-nourished. No distress.   HENT:   Head: Normocephalic and atraumatic.   Right Ear: External ear normal.   Left Ear: External ear normal.   Nose: Nose normal.   Mouth/Throat: Oropharynx is clear and moist. No  oropharyngeal exudate.   Mild pnd   Eyes: Pupils are equal, round, and reactive to light. Conjunctivae and EOM are normal. Right eye exhibits no discharge. Left eye exhibits no discharge. No scleral icterus.   Neck: Normal range of motion. Neck supple. No tracheal deviation present. No thyromegaly present.   Cardiovascular: Normal rate, regular rhythm, normal heart sounds and normal pulses. Exam reveals no gallop.   No murmur heard.  Pulmonary/Chest: Effort normal and breath sounds normal. No respiratory distress. She has no wheezes. She has no rales.   Musculoskeletal: Normal range of motion.   Lymphadenopathy:     She has no cervical adenopathy.   Neurological: She is alert and oriented to person, place, and time. She exhibits normal muscle tone. Coordination normal.   Skin: Skin is warm. No rash noted. No erythema. No pallor.   Psychiatric: She has a normal mood and affect. Her behavior is normal. Judgment and thought content normal.   Nursing note and vitals reviewed.      Assessment/Plan   Anabella was seen today for uri.    Diagnoses and all orders for this visit:    Acute bronchitis due to infection  -     XR Chest PA & Lateral    Other orders  -     methylPREDNISolone (MEDROL, PARRISH,) 4 MG tablet; follow package directions  -     azithromycin (ZITHROMAX) 250 MG tablet; Take 2 tablets the first day, then 1 tablet daily for 4 days for infection  -     albuterol (ProAir RespiClick) 108 (90 Base) MCG/ACT inhaler; Inhale 2 puffs Every 6 (Six) Hours As Needed for Wheezing or Shortness of Air.      Start Zpak  Oral pred for acute bronchitis; chest tight; smoker; Albuterol PRN           Answers for HPI/ROS submitted by the patient on 3/19/2020   Shortness of breath  What is the primary reason for your visit?: Shortness of Breath

## 2020-03-23 ENCOUNTER — TELEPHONE (OUTPATIENT)
Dept: FAMILY MEDICINE CLINIC | Facility: CLINIC | Age: 55
End: 2020-03-23

## 2020-03-23 NOTE — TELEPHONE ENCOUNTER
Pt states that she is still has a cough, nasal congestion, chest tightness, and headache pt is wanting to know if she needs to stay off of work longer or return back today?

## 2020-05-15 RX ORDER — ATORVASTATIN CALCIUM 20 MG/1
TABLET, FILM COATED ORAL
Qty: 90 TABLET | Refills: 3 | Status: SHIPPED | OUTPATIENT
Start: 2020-05-15 | End: 2020-09-08

## 2020-07-31 ENCOUNTER — OFFICE VISIT (OUTPATIENT)
Dept: FAMILY MEDICINE CLINIC | Facility: CLINIC | Age: 55
End: 2020-07-31

## 2020-07-31 VITALS
RESPIRATION RATE: 16 BRPM | HEIGHT: 64 IN | WEIGHT: 157 LBS | DIASTOLIC BLOOD PRESSURE: 62 MMHG | TEMPERATURE: 97.8 F | HEART RATE: 112 BPM | BODY MASS INDEX: 26.8 KG/M2 | OXYGEN SATURATION: 99 % | SYSTOLIC BLOOD PRESSURE: 116 MMHG

## 2020-07-31 DIAGNOSIS — M54.50 LUMBAR PAIN: Primary | ICD-10-CM

## 2020-07-31 PROCEDURE — 99213 OFFICE O/P EST LOW 20 MIN: CPT | Performed by: PHYSICIAN ASSISTANT

## 2020-07-31 PROCEDURE — 72100 X-RAY EXAM L-S SPINE 2/3 VWS: CPT | Performed by: PHYSICIAN ASSISTANT

## 2020-07-31 RX ORDER — TIZANIDINE 4 MG/1
TABLET ORAL
Qty: 45 TABLET | Refills: 1 | Status: SHIPPED | OUTPATIENT
Start: 2020-07-31 | End: 2020-09-09

## 2020-07-31 RX ORDER — ONDANSETRON HYDROCHLORIDE 8 MG/1
TABLET, FILM COATED ORAL
COMMUNITY
Start: 2020-07-22 | End: 2020-10-27

## 2020-07-31 RX ORDER — TAMSULOSIN HYDROCHLORIDE 0.4 MG/1
CAPSULE ORAL
COMMUNITY
Start: 2020-07-22 | End: 2020-10-27

## 2020-07-31 RX ORDER — HYDROCODONE BITARTRATE AND ACETAMINOPHEN 5; 325 MG/1; MG/1
TABLET ORAL
COMMUNITY
Start: 2020-07-22 | End: 2020-10-27

## 2020-07-31 NOTE — PROGRESS NOTES
Subjective   Anabella Baum is a 55 y.o. female.     History of Present Illness   Anabella Baum 55 y.o. female presents for evaluation and treatment of  low back pain. The patient first noted symptoms several weeks ago. It was not related to none recalled by the patient.  The pain intensity is mild and moderate , and is located lumbar and was left lumbar----spasms. The pain is described as aching, burning and spasmotic and occurs intermittently. The symptoms are not progressive. Symptoms are exacerbated by sitting and light exertion. Factors which relieve the pain include analgesics. Other associated symptoms include denies pain radiating down right leg, denies pain radiating down left leg, denies motor loss, denies sensory loss, denies burning in legs, denies N/T in legs, denies loss of muscle tone and denies loss of bowel or bladder control. Previous history of symptoms: never. Treatment efforts have included analgesics , with relief.  X-Ray  Interpretation report in house X-rays that I personally viewed    Relevant Clinical Issues/Diagnoses/Indications:  Back pain        Clinical Findings: I do see the calcifications in the facet joints of her vertebrae's lumbar... Mild degenerative disc changes also noted without compression fracture          Comparative Data:  none          Date of Previous X-ray:    Change on current X-ray:      Just had CT abd pelvis with urologist-----7-23-20;  Went d/t left side spasms. This is better now; feels achy across her lumbar spine. Also note atherosclerosis  I want to get xrays and go to PT.  I can Rx muscle relaxer if happens again.  The following portions of the patient's history were reviewed and updated as appropriate: allergies, current medications, past family history, past medical history, past social history, past surgical history and problem list.    Review of Systems   Constitutional: Negative for activity change, appetite change and unexpected weight change.   HENT:  Negative for nosebleeds and trouble swallowing.    Eyes: Negative for pain and visual disturbance.   Respiratory: Negative for chest tightness, shortness of breath and wheezing.    Cardiovascular: Negative for chest pain and palpitations.   Gastrointestinal: Negative for abdominal pain and blood in stool.   Endocrine: Negative.    Genitourinary: Negative for difficulty urinating and hematuria.   Musculoskeletal: Positive for back pain. Negative for joint swelling.   Skin: Negative for color change and rash.   Allergic/Immunologic: Negative.    Neurological: Negative for syncope and speech difficulty.   Hematological: Negative for adenopathy.   Psychiatric/Behavioral: Negative for agitation and confusion.   All other systems reviewed and are negative.      Objective   Physical Exam   Constitutional: She is oriented to person, place, and time. She appears well-developed and well-nourished. No distress.   HENT:   Head: Normocephalic and atraumatic.   Eyes: Pupils are equal, round, and reactive to light. Conjunctivae and EOM are normal. No scleral icterus.   Neck: Normal range of motion. Neck supple.   Cardiovascular: Normal rate, regular rhythm and normal heart sounds. Exam reveals no gallop.   No murmur heard.  Pulmonary/Chest: Effort normal and breath sounds normal. No respiratory distress. She has no wheezes. She has no rales. She exhibits no tenderness.   Abdominal: Soft. There is no tenderness.   Musculoskeletal: She exhibits tenderness. She exhibits no deformity.   Neurological: She is alert and oriented to person, place, and time. She has normal reflexes. She displays no atrophy, no tremor and normal reflexes. No sensory deficit. She exhibits normal muscle tone. Coordination normal.   Reflex Scores:       Patellar reflexes are 2+ on the right side and 2+ on the left side.       Achilles reflexes are 2+ on the right side and 2+ on the left side.  Skin: Skin is warm and dry. No rash noted. She is not diaphoretic.    Psychiatric: She has a normal mood and affect. Her behavior is normal. Judgment and thought content normal.   Nursing note and vitals reviewed.      Assessment/Plan   Diagnoses and all orders for this visit:    Lumbar pain  -     XR Spine Lumbar 2 or 3 View    Other orders  -     tiZANidine (Zanaflex) 4 MG tablet; 1/2-1 tab PO Q 8 hours PRN muscle spasms      Consider CT low dose chest  Zanaflex PRN spasms  Refer PT  Still get labs

## 2020-08-20 RX ORDER — LISINOPRIL 5 MG/1
5 TABLET ORAL DAILY
Qty: 90 TABLET | Refills: 0 | Status: SHIPPED | OUTPATIENT
Start: 2020-08-20 | End: 2020-09-08

## 2020-08-21 LAB
25(OH)D3+25(OH)D2 SERPL-MCNC: 58.2 NG/ML (ref 30–100)
ALBUMIN SERPL-MCNC: 4.6 G/DL (ref 3.8–4.9)
ALBUMIN/GLOB SERPL: 2.1 {RATIO} (ref 1.2–2.2)
ALP SERPL-CCNC: 119 IU/L (ref 39–117)
ALT SERPL-CCNC: 14 IU/L (ref 0–32)
APPEARANCE UR: CLEAR
AST SERPL-CCNC: 13 IU/L (ref 0–40)
BACTERIA #/AREA URNS HPF: NORMAL /[HPF]
BASOPHILS # BLD AUTO: 0 X10E3/UL (ref 0–0.2)
BASOPHILS NFR BLD AUTO: 0 %
BILIRUB SERPL-MCNC: 0.5 MG/DL (ref 0–1.2)
BILIRUB UR QL STRIP: NEGATIVE
BUN SERPL-MCNC: 15 MG/DL (ref 6–24)
BUN/CREAT SERPL: 21 (ref 9–23)
CALCIUM SERPL-MCNC: 9.7 MG/DL (ref 8.7–10.2)
CHLORIDE SERPL-SCNC: 101 MMOL/L (ref 96–106)
CHOLEST SERPL-MCNC: 144 MG/DL (ref 100–199)
CO2 SERPL-SCNC: 27 MMOL/L (ref 20–29)
COLOR UR: YELLOW
CREAT SERPL-MCNC: 0.72 MG/DL (ref 0.57–1)
EOSINOPHIL # BLD AUTO: 0.2 X10E3/UL (ref 0–0.4)
EOSINOPHIL NFR BLD AUTO: 2 %
EPI CELLS #/AREA URNS HPF: NORMAL /HPF (ref 0–10)
ERYTHROCYTE [DISTWIDTH] IN BLOOD BY AUTOMATED COUNT: 12 % (ref 11.7–15.4)
FOLATE SERPL-MCNC: 17.4 NG/ML
GLOBULIN SER CALC-MCNC: 2.2 G/DL (ref 1.5–4.5)
GLUCOSE SERPL-MCNC: 98 MG/DL (ref 65–99)
GLUCOSE UR QL: NEGATIVE
HCT VFR BLD AUTO: 41.8 % (ref 34–46.6)
HDLC SERPL-MCNC: 44 MG/DL
HGB BLD-MCNC: 14.6 G/DL (ref 11.1–15.9)
HGB UR QL STRIP: ABNORMAL
IMM GRANULOCYTES # BLD AUTO: 0 X10E3/UL (ref 0–0.1)
IMM GRANULOCYTES NFR BLD AUTO: 0 %
KETONES UR QL STRIP: NEGATIVE
LDLC SERPL CALC-MCNC: 84 MG/DL (ref 0–99)
LEUKOCYTE ESTERASE UR QL STRIP: ABNORMAL
LYMPHOCYTES # BLD AUTO: 2 X10E3/UL (ref 0.7–3.1)
LYMPHOCYTES NFR BLD AUTO: 22 %
MCH RBC QN AUTO: 31.8 PG (ref 26.6–33)
MCHC RBC AUTO-ENTMCNC: 34.9 G/DL (ref 31.5–35.7)
MCV RBC AUTO: 91 FL (ref 79–97)
MICRO URNS: ABNORMAL
MONOCYTES # BLD AUTO: 0.8 X10E3/UL (ref 0.1–0.9)
MONOCYTES NFR BLD AUTO: 9 %
MUCOUS THREADS URNS QL MICRO: PRESENT
NEUTROPHILS # BLD AUTO: 6.2 X10E3/UL (ref 1.4–7)
NEUTROPHILS NFR BLD AUTO: 67 %
NITRITE UR QL STRIP: NEGATIVE
PH UR STRIP: 5.5 [PH] (ref 5–7.5)
PLATELET # BLD AUTO: 244 X10E3/UL (ref 150–450)
POTASSIUM SERPL-SCNC: 4.6 MMOL/L (ref 3.5–5.2)
PROT SERPL-MCNC: 6.8 G/DL (ref 6–8.5)
PROT UR QL STRIP: NEGATIVE
RBC # BLD AUTO: 4.59 X10E6/UL (ref 3.77–5.28)
RBC #/AREA URNS HPF: NORMAL /HPF (ref 0–2)
SODIUM SERPL-SCNC: 141 MMOL/L (ref 134–144)
SP GR UR: 1.02 (ref 1–1.03)
T3FREE SERPL-MCNC: 3.5 PG/ML (ref 2–4.4)
T4 FREE SERPL-MCNC: 1.15 NG/DL (ref 0.82–1.77)
TRIGL SERPL-MCNC: 82 MG/DL (ref 0–149)
TSH SERPL DL<=0.005 MIU/L-ACNC: 1.46 UIU/ML (ref 0.45–4.5)
UROBILINOGEN UR STRIP-MCNC: 0.2 MG/DL (ref 0.2–1)
VIT B12 SERPL-MCNC: 371 PG/ML (ref 232–1245)
VLDLC SERPL CALC-MCNC: 16 MG/DL (ref 5–40)
WBC # BLD AUTO: 9.2 X10E3/UL (ref 3.4–10.8)
WBC #/AREA URNS HPF: NORMAL /HPF (ref 0–5)

## 2020-08-27 ENCOUNTER — TREATMENT (OUTPATIENT)
Dept: PHYSICAL THERAPY | Facility: CLINIC | Age: 55
End: 2020-08-27

## 2020-08-27 DIAGNOSIS — M54.50 CHRONIC LEFT-SIDED LOW BACK PAIN WITHOUT SCIATICA: Primary | ICD-10-CM

## 2020-08-27 DIAGNOSIS — R26.81 DIFFICULTY STANDING: ICD-10-CM

## 2020-08-27 DIAGNOSIS — M25.69 BACK STIFFNESS: ICD-10-CM

## 2020-08-27 DIAGNOSIS — G89.29 CHRONIC LEFT-SIDED LOW BACK PAIN WITHOUT SCIATICA: Primary | ICD-10-CM

## 2020-08-27 PROCEDURE — 97110 THERAPEUTIC EXERCISES: CPT | Performed by: PHYSICAL THERAPIST

## 2020-08-27 PROCEDURE — 97162 PT EVAL MOD COMPLEX 30 MIN: CPT | Performed by: PHYSICAL THERAPIST

## 2020-08-27 NOTE — PROGRESS NOTES
Physical Therapy Initial Evaluation and Plan of Care    Patient: Anabella Baum   : 1965  Diagnosis/ICD-10 Code:  Chronic left-sided low back pain without sciatica [M54.5, G89.29]  Referring practitioner: Manuela Baum PA-C  Past Medical History Reviewed: 2020    PLOF: Planting, gardening, lifting    Subjective Evaluation    History of Present Illness  Date of onset: 2020  Mechanism of injury: Pt noted back pain that started a couple months ago that felt like kidney stones with extreme pain. CT scan shows severe spinal arthritis. Pt has hx of back issues with lifting and weeding the yard. She now avoids lifting heavy objects and is conscious of her posture. She hasn't experienced extreme pain in the last two weeks. She notes a punching sensation when intense. Limited activity now but still able to perform home tasks.       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    Precautions and Work Restrictions: Not able to lift 10# or more. Quality of life: good    Pain  Current pain ratin  At best pain ratin  At worst pain ratin  Location: mid lower back (had some symptoms in the left when intense)   Quality: sharp  Relieving factors: rest and medications (tylenol )  Exacerbated by: L rotation   Progression: worsening    Social Support  Lives with: spouse    Diagnostic Tests  X-ray: abnormal  CT scan: abnormal (spinal arthritis )    Treatments  Previous treatment: medication (have not used yet )  Patient Goals  Patient goals for therapy: decreased pain, increased strength, return to sport/leisure activities and independence with ADLs/IADLs  Patient goal: finding out triggers to avoid           Objective          Static Posture     Head  Forward.    Shoulders  Rounded.    Comments  Standing: L PSIS elevated  Prone: PSIS even   Supine: ASIS even     Neurological Testing     Sensation     Lumbar   Left   Intact: light touch    Right   Intact: light touch    Reflexes    Left   Patellar (L4): normal (2+)  Achilles (S1): absent (0)    Right   Patellar (L4): absent (0)  Achilles (S1): absent (0)    Active Range of Motion   Cervical/Thoracic Spine     Thoracic   Flexion: WFL  Extension: 10 degrees with pain    Lumbar   Left rotation: 50 degrees   Right rotation: 100 degrees     Strength/Myotome Testing     Left Hip   Planes of Motion   Flexion: 3+  Abduction: 4+  External rotation: 4+  Internal rotation: 4+ (pain on the L hip )    Right Hip   Planes of Motion   Flexion: 3+  Abduction: 4+  External rotation: 4+  Internal rotation: 4+ (pain up the R back )    Left Knee   Flexion: WFL  Extension: WFL    Right Knee   Flexion: WFL  Extension: WFL    Tests     Left Pelvic Girdle/Sacrum   Negative: sacrum compression.     Right Pelvic Girdle/Sacrum   Negative: sacrum compression.     Left Hip   SLR: Negative.     Right Hip   SLR: Negative.     Additional Tests Details  + L Quadrant test.   + sacral distraction (pain in the posterior sacrum region)     + crossed (L) SLR   - B ASLR and SLR   Traction causes opposite hip pulling         Right Hip Flexibility Comments:   Hypomobility with mobilizations into lumbar R rotation.           Assessment & Plan     Assessment  Impairments: abnormal or restricted ROM, activity intolerance, impaired physical strength, lacks appropriate home exercise program and pain with function  Assessment details: Pt presents to PT with symptoms consistent with lumbar arthritis especially on the L side.  Pt would benefit from skilled PT intervention to address decreased lumbar mobility, pain with movement, and decreased hip strength.     Pt presented to the clinic with limited mobility with pulling sensation and pain with facet closure on the L lumbar spine with limited L rot. Pt showed trunk and knee compensation with bilateral SB. Pt was able to tolerate her exercises.     Prognosis: good  Functional Limitations: carrying objects, lifting, sleeping, walking,  pulling, uncomfortable because of pain, moving in bed, sitting and unable to perform repetitive tasks  Goals  Plan Goals: SHORT TERM GOALS: Time for Goal Achievement: 4 weeks    1.  Patient to be compliant and progression of HEP                             2.  Pain level < 5/10 at worst with mentioned activities to improve function  3.  Increased thoracic, lumbar and SIJ mobility to allow for increased lumbar AROM with less pain.  4.  Increased lumbar AROM to by 25% in all planes to allow for increased ease with sit-stand transfers and functional activities    LONG TERM GOALS: Time for Goal Achievement: 2 months  1.  Pt. to score < 12 % on Back Index  2.  Pain level < 3/10 with all listed activities to return to normal.  3.  Lumbar AROM to WFL to allow for return to household & recreational activities w/o increased symptoms  4.  (B) LE and lower abdominal strength to 5/5 to allow for pushing, pulling and activities to occur without pain (driving, sitting, household  & Job requirements)        Plan  Therapy options: will be seen for skilled physical therapy services  Planned modality interventions: cryotherapy, electrical stimulation/Russian stimulation, TENS, thermotherapy (hydrocollator packs) and ultrasound  Other planned modality interventions: Dry Needling  Planned therapy interventions: body mechanics training, flexibility, functional ROM exercises, home exercise program, manual therapy, neuromuscular re-education, postural training, spinal/joint mobilization, stretching, strengthening, soft tissue mobilization and therapeutic activities  Frequency: 2x week  Duration in visits: 16  Treatment plan discussed with: patient        Manual Therapy:    -     mins  70468;  Therapeutic Exercise:    10     mins  07902;     Neuromuscular Melissa:        mins  13861;    Therapeutic Activity:     -     mins  84597;     Gait Training:      -     mins  51197;     Ultrasound:     -     mins  99224;    Electrical Stimulation:    -      mins  69323 ( );  Dry Needling     -     mins self-pay    Timed Treatment:   10   mins   Total Treatment:     60   mins    Indira Joyner, Student Pt supervised by Dania Ledezma PT, DPT.       PT SIGNATURE: COLLEEN Rojas license #: [id number]  DATE TREATMENT INITIATED: 8/28/2020    Initial Certification   Certification Period: 11/26/2020  I certify that the therapy services are furnished while this patient is under my care.  The services outlined above are required by this patient, and will be reviewed every 90 days.     PHYSICIAN: Manuela Baum PA-C      DATE:     Please sign and return via fax to [phone].. Thank you, [facility].

## 2020-09-02 ENCOUNTER — TREATMENT (OUTPATIENT)
Dept: PHYSICAL THERAPY | Facility: CLINIC | Age: 55
End: 2020-09-02

## 2020-09-02 DIAGNOSIS — M54.50 CHRONIC LEFT-SIDED LOW BACK PAIN WITHOUT SCIATICA: Primary | ICD-10-CM

## 2020-09-02 DIAGNOSIS — R26.81 DIFFICULTY STANDING: ICD-10-CM

## 2020-09-02 DIAGNOSIS — G89.29 CHRONIC LEFT-SIDED LOW BACK PAIN WITHOUT SCIATICA: Primary | ICD-10-CM

## 2020-09-02 DIAGNOSIS — M25.69 BACK STIFFNESS: ICD-10-CM

## 2020-09-02 PROCEDURE — 97032 APPL MODALITY 1+ESTIM EA 15: CPT | Performed by: PHYSICAL THERAPIST

## 2020-09-02 PROCEDURE — 97110 THERAPEUTIC EXERCISES: CPT | Performed by: PHYSICAL THERAPIST

## 2020-09-02 PROCEDURE — 97530 THERAPEUTIC ACTIVITIES: CPT | Performed by: PHYSICAL THERAPIST

## 2020-09-02 NOTE — PROGRESS NOTES
Physical Therapy Daily Progress Note  Visit: 2    Anabella Baum reports: pt states she is doing well and had soreness following the last therapy session. She did not report localized low back pain or tenderness to multifidi and erector spinae musculature on either side. She reports occasional pulsing pain when standing for several hours. She is not compliant with her HEP.     Subjective     Objective: tenderness noted on SP T12 and L3. Limited L rotation spinal joint mobility.  See Exercise, Manual, and Modality Logs for complete treatment.       Assessment/Plan: Pt was able to tolerate exercises and positional distraction today. Pt was educated on a sleeping position that allows L spinal opening to prevent pain at night while also preventing spinal rotation. Pt was able activate TA. Further PT is warranted to decrease low back pain throughout the day so pt can perform ADLs independently.     Manual Therapy:    4     mins  28913;  Therapeutic Exercise:    30     mins  18556;     Neuromuscular Melissa:    -    mins  73884;    Therapeutic Activity:     10     mins  45060; Sleeping position and HEP     Gait Training:      -     mins  26216;     Ultrasound:     -     mins  40010;    Electrical Stimulation:    15     mins  44038 ( );  Dry Needling     -     mins self-pay    Timed Treatment:   59   mins   Total Treatment:     65   mins    Indira Joyner, Student PT supervised by Dania Ledezma PT, DPT.     Dania Ledezma PT  [place] License #: [id number]    Physical Therapist

## 2020-09-04 ENCOUNTER — TREATMENT (OUTPATIENT)
Dept: PHYSICAL THERAPY | Facility: CLINIC | Age: 55
End: 2020-09-04

## 2020-09-04 DIAGNOSIS — R26.81 DIFFICULTY STANDING: ICD-10-CM

## 2020-09-04 DIAGNOSIS — G89.29 CHRONIC LEFT-SIDED LOW BACK PAIN WITHOUT SCIATICA: Primary | ICD-10-CM

## 2020-09-04 DIAGNOSIS — M54.50 CHRONIC LEFT-SIDED LOW BACK PAIN WITHOUT SCIATICA: Primary | ICD-10-CM

## 2020-09-04 DIAGNOSIS — M25.69 BACK STIFFNESS: ICD-10-CM

## 2020-09-04 PROCEDURE — 97140 MANUAL THERAPY 1/> REGIONS: CPT | Performed by: PHYSICAL THERAPIST

## 2020-09-04 PROCEDURE — 97110 THERAPEUTIC EXERCISES: CPT | Performed by: PHYSICAL THERAPIST

## 2020-09-04 NOTE — PROGRESS NOTES
Physical Therapy Daily Progress Note    Visit # : 3  Anabella Baum reports: my back is feeling a little better; no significant sleep disturbance.  It still feels tight and sore on my left side.  Pt denies radicular complaints. 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      Subjective     Objective   See Exercise, Manual, and Modality Logs for complete treatment.   Level ASIS  Palp tenderness TP L L3-5 and paraspinals/QL; - L SI    Assessment/Plan  Good tolerance to ex progressions today; pt was educated on anatomy and healing process and PT plan of care.  Pt tolerated manual interventions well and noted relief with stretch progression so was issued an updated HEP printout to facilitate compliance and recall.  Pt declined IFC this visit as did not find it significantly helpful.   Progress strengthening /stabilization /functional activity       Timed:  Manual Therapy:    8     mins  00041;  Therapeutic Exercise:    25     mins  64085;     Neuromuscular Melissa:    -    mins  90864;    Therapeutic Activity:     -     mins  22398;     Gait Training:      -     mins  50372;     Ultrasound:     -     mins  94648;    Iontophoresis                 -     mins 21027    Timed Treatment:   33   mins   Total Treatment:     45   mins      Yamini Sewell PT  Physical Therapist  [place] License # [id number]

## 2020-09-04 NOTE — PATIENT INSTRUCTIONS
Access Code: [email or web address]   URL: [email or web address]   Date: 09/04/2020   Prepared by: Rupinder Sewell     Exercises  Supine ITB Stretch - 3 reps - 1 sets - 20 hold - 1x daily

## 2020-09-08 ENCOUNTER — OFFICE VISIT (OUTPATIENT)
Dept: FAMILY MEDICINE CLINIC | Facility: CLINIC | Age: 55
End: 2020-09-08

## 2020-09-08 ENCOUNTER — TREATMENT (OUTPATIENT)
Dept: PHYSICAL THERAPY | Facility: CLINIC | Age: 55
End: 2020-09-08

## 2020-09-08 VITALS
RESPIRATION RATE: 16 BRPM | HEIGHT: 64 IN | BODY MASS INDEX: 26.63 KG/M2 | WEIGHT: 156 LBS | TEMPERATURE: 97.3 F | DIASTOLIC BLOOD PRESSURE: 90 MMHG | OXYGEN SATURATION: 97 % | SYSTOLIC BLOOD PRESSURE: 150 MMHG | HEART RATE: 86 BPM

## 2020-09-08 DIAGNOSIS — I10 BENIGN ESSENTIAL HTN: Primary | ICD-10-CM

## 2020-09-08 DIAGNOSIS — Z72.0 TOBACCO ABUSE: ICD-10-CM

## 2020-09-08 DIAGNOSIS — G89.29 CHRONIC LEFT-SIDED LOW BACK PAIN WITHOUT SCIATICA: Primary | ICD-10-CM

## 2020-09-08 DIAGNOSIS — M54.50 CHRONIC LEFT-SIDED LOW BACK PAIN WITHOUT SCIATICA: Primary | ICD-10-CM

## 2020-09-08 DIAGNOSIS — M25.69 BACK STIFFNESS: ICD-10-CM

## 2020-09-08 DIAGNOSIS — K21.9 GASTROESOPHAGEAL REFLUX DISEASE WITHOUT ESOPHAGITIS: ICD-10-CM

## 2020-09-08 DIAGNOSIS — E78.2 MIXED HYPERLIPIDEMIA: ICD-10-CM

## 2020-09-08 DIAGNOSIS — E53.8 LOW SERUM VITAMIN B12: ICD-10-CM

## 2020-09-08 DIAGNOSIS — R26.81 DIFFICULTY STANDING: ICD-10-CM

## 2020-09-08 DIAGNOSIS — R09.89 BRUIT OF LEFT CAROTID ARTERY: ICD-10-CM

## 2020-09-08 DIAGNOSIS — E55.9 VITAMIN D DEFICIENCY: ICD-10-CM

## 2020-09-08 DIAGNOSIS — I65.22 STENOSIS OF LEFT CAROTID ARTERY: ICD-10-CM

## 2020-09-08 PROCEDURE — 90471 IMMUNIZATION ADMIN: CPT | Performed by: PHYSICIAN ASSISTANT

## 2020-09-08 PROCEDURE — 97140 MANUAL THERAPY 1/> REGIONS: CPT | Performed by: PHYSICAL THERAPIST

## 2020-09-08 PROCEDURE — 90715 TDAP VACCINE 7 YRS/> IM: CPT | Performed by: PHYSICIAN ASSISTANT

## 2020-09-08 PROCEDURE — 99214 OFFICE O/P EST MOD 30 MIN: CPT | Performed by: PHYSICIAN ASSISTANT

## 2020-09-08 PROCEDURE — 97110 THERAPEUTIC EXERCISES: CPT | Performed by: PHYSICAL THERAPIST

## 2020-09-08 RX ORDER — LISINOPRIL 10 MG/1
10 TABLET ORAL DAILY
Qty: 90 TABLET | Refills: 0 | Status: SHIPPED | OUTPATIENT
Start: 2020-09-08 | End: 2020-10-27 | Stop reason: SDUPTHER

## 2020-09-08 RX ORDER — BUPROPION HYDROCHLORIDE 100 MG/1
100 TABLET, EXTENDED RELEASE ORAL EVERY 12 HOURS SCHEDULED
Qty: 60 TABLET | Refills: 5 | Status: SHIPPED | OUTPATIENT
Start: 2020-09-08 | End: 2020-12-30 | Stop reason: ALTCHOICE

## 2020-09-08 RX ORDER — MAGNESIUM 200 MG
TABLET ORAL
Qty: 90 EACH | Refills: 3 | Status: SHIPPED | OUTPATIENT
Start: 2020-09-08 | End: 2020-11-10 | Stop reason: SDUPTHER

## 2020-09-08 RX ORDER — ATORVASTATIN CALCIUM 40 MG/1
40 TABLET, FILM COATED ORAL DAILY
Qty: 90 TABLET | Refills: 3 | Status: SHIPPED | OUTPATIENT
Start: 2020-09-08 | End: 2020-11-10 | Stop reason: SDUPTHER

## 2020-09-08 NOTE — PROGRESS NOTES
Physical Therapy Daily Progress Note  Visit: 4    Anabella aBum reports: The back has been doing good    Subjective     Objective   See Exercise, Manual, and Modality Logs for complete treatment.       Assessment & Plan     Assessment  Assessment details: Pt tolerated treatment very well. Progressed HEP. Continue with focus on core and lumbar stability and begin more dynamic balance activities    Plan  Plan details: Begin step ups on BOSU and single leg balance next session        Manual Therapy:    9     mins  65194;  Therapeutic Exercise:    40     mins  00374;     Neuromuscular Melissa:    -    mins  63589;    Therapeutic Activity:     -     mins  36038;     Gait Training:      -     mins  83162;     Ultrasound:     -     mins  47981;    Electrical Stimulation:    -     mins  51093 ( );  Dry Needling     -     mins self-pay    Timed Treatment:   49   mins   Total Treatment:     60   mins    COLLEEN Rojas License #: [id number]    Physical Therapist

## 2020-09-08 NOTE — PROGRESS NOTES
Immunization  Immunization performed in RD by Fatoumata Roy MA. Patient tolerated the procedure well without complications.  09/08/20   Fatoumata Roy MA

## 2020-09-08 NOTE — PROGRESS NOTES
"Subjective   Anabella Baum is a 55 y.o. female.     History of Present Illness    Since the last visit, she has overall felt well.  She has Essential Hypertension not at goal, plan to add/adjust medication, GERD controlled on PPI Rx, Vitamin D deficiency and labs are at goal >30 ng/mL and mixed hyperlipidemia--goal not met; raise dose.  she has been compliant with current medications have reviewed them.  The patient denies medication side effects.  Will refill medications. /82   Pulse 86   Temp 97.3 °F (36.3 °C)   Resp 16   Ht 162.6 cm (64\")   Wt 70.8 kg (156 lb)   LMP  (LMP Unknown)   SpO2 97%   BMI 26.78 kg/m²   Failed Zantac  Need to make sure bp <130/80 and keep LDL <70 d/t mild carotid stenosis.---goal not met--go up on dose Lipitor. Stop smoking    Has family Hashimoto's  She does want to try Wellbutrin for stopping smoking.  Not depressed. No hx seizures.  Not anxious.  Will do low dose CT chest d/t 30 pk/yr hx.  I will make order  The following portions of the patient's history were reviewed and updated as appropriate: allergies, current medications, past family history, past medical history, past social history, past surgical history and problem list.    Review of Systems   Constitutional: Negative for activity change, appetite change and unexpected weight change.   HENT: Negative for nosebleeds and trouble swallowing.    Eyes: Negative for pain and visual disturbance.   Respiratory: Negative for chest tightness, shortness of breath and wheezing.    Cardiovascular: Negative for chest pain and palpitations.   Gastrointestinal: Negative for abdominal pain and blood in stool.   Endocrine: Negative.    Genitourinary: Negative for difficulty urinating and hematuria.   Musculoskeletal: Negative for joint swelling.   Skin: Negative for color change and rash.   Allergic/Immunologic: Negative.    Neurological: Negative for syncope and speech difficulty.   Hematological: Negative for adenopathy. "   Psychiatric/Behavioral: Negative for agitation and confusion.   All other systems reviewed and are negative.      Objective   Physical Exam   Constitutional: She is oriented to person, place, and time. She appears well-developed and well-nourished. No distress.   HENT:   Head: Normocephalic and atraumatic.   Eyes: Pupils are equal, round, and reactive to light. Conjunctivae and EOM are normal. Right eye exhibits no discharge. Left eye exhibits no discharge. No scleral icterus.   Neck: Normal range of motion. Neck supple. No tracheal deviation present. No thyromegaly present.   Cardiovascular: Normal rate, regular rhythm, normal heart sounds, intact distal pulses and normal pulses. Exam reveals no gallop.   No murmur heard.  Pulmonary/Chest: Effort normal and breath sounds normal. No respiratory distress. She has no wheezes. She has no rales.   Musculoskeletal: Normal range of motion.   Neurological: She is alert and oriented to person, place, and time. She exhibits normal muscle tone. Coordination normal.   Skin: Skin is warm. No rash noted. No erythema. No pallor.   Psychiatric: She has a normal mood and affect. Her behavior is normal. Judgment and thought content normal.   Nursing note and vitals reviewed.      Assessment/Plan   Anabella was seen today for lab review.    Diagnoses and all orders for this visit:    Benign essential HTN    Mixed hyperlipidemia  -     Hepatic Function Panel (6) ([facility]); Future  -     Lipid Panel; Future    Bruit of left carotid artery  -     Hepatic Function Panel (6) ([facility]); Future  -     Lipid Panel; Future    Gastroesophageal reflux disease without esophagitis    Vitamin D deficiency    Stenosis of left carotid artery  -     Hepatic Function Panel (6) ([facility]); Future  -     Lipid Panel; Future    Tobacco abuse    Other orders  -     buPROPion SR (WELLBUTRIN SR) 100 MG 12 hr tablet; Take 1 tablet by mouth Every 12 (Twelve) Hours.  -     Tdap Vaccine Greater Than or Equal To  6yo IM      Plan, Anabella Baum, was seen today.  she was seen for HTN and add/adjust medication, GERD and will continue on PPI medication, Hyperlipidemia with new medication plan and Vitamin D deficiency and supplemented.  F/u few weeks for bp check  Go up on dose Lipitor  Labs 4-6 weeks  Low dose CT chest 30 pk yr hx  Carotid doppler due Feb  Trial Wellbutrin SR to stop smoking, not depressed  Update vaccines

## 2020-09-09 RX ORDER — TIZANIDINE 4 MG/1
TABLET ORAL
Qty: 45 TABLET | Refills: 1 | Status: SHIPPED | OUTPATIENT
Start: 2020-09-09 | End: 2020-12-30 | Stop reason: ALTCHOICE

## 2020-09-11 ENCOUNTER — TREATMENT (OUTPATIENT)
Dept: PHYSICAL THERAPY | Facility: CLINIC | Age: 55
End: 2020-09-11

## 2020-09-11 DIAGNOSIS — M25.69 BACK STIFFNESS: ICD-10-CM

## 2020-09-11 DIAGNOSIS — G89.29 CHRONIC LEFT-SIDED LOW BACK PAIN WITHOUT SCIATICA: Primary | ICD-10-CM

## 2020-09-11 DIAGNOSIS — R26.81 DIFFICULTY STANDING: ICD-10-CM

## 2020-09-11 DIAGNOSIS — M54.50 CHRONIC LEFT-SIDED LOW BACK PAIN WITHOUT SCIATICA: Primary | ICD-10-CM

## 2020-09-11 PROCEDURE — 97110 THERAPEUTIC EXERCISES: CPT | Performed by: PHYSICAL THERAPIST

## 2020-09-11 PROCEDURE — 97112 NEUROMUSCULAR REEDUCATION: CPT | Performed by: PHYSICAL THERAPIST

## 2020-09-11 NOTE — PROGRESS NOTES
Physical Therapy Daily Progress Note  Visit: 5    Anabella Baum reports: I did good after last session    Subjective     Objective   See Exercise, Manual, and Modality Logs for complete treatment.       Assessment & Plan     Assessment  Assessment details: Pt doing very well with progression of therapy. Able to increase reps and amount of standing exercises without increase in sx's.    Plan  Plan details: Progress with POC        Manual Therapy:    -     mins  41941;  Therapeutic Exercise:    38     mins  98242;     Neuromuscular Melissa:    9    mins  59024;    Therapeutic Activity:     -     mins  37365;     Gait Training:      -     mins  54158;     Ultrasound:     -     mins  25916;    Electrical Stimulation:    -     mins  74621 ( );  Dry Needling     -     mins self-pay    Timed Treatment:   47   mins   Total Treatment:     57   mins    Dania Ledezma, PT  [place] License #: [id number]    Physical Therapist

## 2020-09-16 ENCOUNTER — TREATMENT (OUTPATIENT)
Dept: PHYSICAL THERAPY | Facility: CLINIC | Age: 55
End: 2020-09-16

## 2020-09-16 DIAGNOSIS — M25.69 BACK STIFFNESS: ICD-10-CM

## 2020-09-16 DIAGNOSIS — G89.29 CHRONIC LEFT-SIDED LOW BACK PAIN WITHOUT SCIATICA: Primary | ICD-10-CM

## 2020-09-16 DIAGNOSIS — M54.50 CHRONIC LEFT-SIDED LOW BACK PAIN WITHOUT SCIATICA: Primary | ICD-10-CM

## 2020-09-16 DIAGNOSIS — R26.81 DIFFICULTY STANDING: ICD-10-CM

## 2020-09-16 PROCEDURE — 97110 THERAPEUTIC EXERCISES: CPT | Performed by: PHYSICAL THERAPIST

## 2020-09-16 PROCEDURE — 97140 MANUAL THERAPY 1/> REGIONS: CPT | Performed by: PHYSICAL THERAPIST

## 2020-09-16 NOTE — PROGRESS NOTES
Physical Therapy Daily Progress Note  Visit: 6    Anabella Baum reports: I am sore today. I was pulling weeds and splitting logs all weekend for hours. I was a little more sore after last visit, a little more pain    Subjective     Objective   See Exercise, Manual, and Modality Logs for complete treatment.       Assessment & Plan     Assessment  Assessment details: Education for trying to take frequent breaks with yard work and to watch her body mechanics. Also to make sure she warms-ups prior and then ices after working.   Educated to walk and perform stretching over the next few days to reduce muscle soreness.    Plan  Plan details: Progress as able        Manual Therapy:    10     mins  99406;  Therapeutic Exercise:    38     mins  62498;     Neuromuscular Melissa:    -    mins  47206;    Therapeutic Activity:     -     mins  93001;     Gait Training:      -     mins  02070;     Ultrasound:     -     mins  13862;    Electrical Stimulation:    -     mins  57810 ( );  Dry Needling     -     mins self-pay    Timed Treatment:   48   mins   Total Treatment:     58   mins    Dania Ledezma, PT  [place] License #: [id number]    Physical Therapist

## 2020-09-18 ENCOUNTER — TREATMENT (OUTPATIENT)
Dept: PHYSICAL THERAPY | Facility: CLINIC | Age: 55
End: 2020-09-18

## 2020-09-18 DIAGNOSIS — R26.81 DIFFICULTY STANDING: ICD-10-CM

## 2020-09-18 DIAGNOSIS — M25.69 BACK STIFFNESS: ICD-10-CM

## 2020-09-18 DIAGNOSIS — M54.50 CHRONIC LEFT-SIDED LOW BACK PAIN WITHOUT SCIATICA: Primary | ICD-10-CM

## 2020-09-18 DIAGNOSIS — G89.29 CHRONIC LEFT-SIDED LOW BACK PAIN WITHOUT SCIATICA: Primary | ICD-10-CM

## 2020-09-18 PROCEDURE — 97112 NEUROMUSCULAR REEDUCATION: CPT | Performed by: PHYSICAL THERAPIST

## 2020-09-18 PROCEDURE — 97110 THERAPEUTIC EXERCISES: CPT | Performed by: PHYSICAL THERAPIST

## 2020-09-18 NOTE — PROGRESS NOTES
Physical Therapy Daily Progress Note  Visit: 7    Anabella Baum reports: I was feeling weakness in my legs on Wednesday night at the grocery store. It did not last and I am a little better today    Subjective     Objective   See Exercise, Manual, and Modality Logs for complete treatment.       Assessment & Plan     Assessment  Assessment details: Pt tolerated treatment well. Educated to perform cat and camel and piriformis stretch at home and to try some ice at home to manage sx's    Plan  Plan details: Progress per POC        Manual Therapy:    3     mins  77375;  Therapeutic Exercise:    39     mins  63598;     Neuromuscular Melissa:    8    mins  79862;    Therapeutic Activity:     -     mins  76673;     Gait Training:      -     mins  75692;     Ultrasound:     -     mins  94575;    Electrical Stimulation:    -     mins  61658 ( );  Dry Needling     -     mins self-pay    Timed Treatment:   51   mins   Total Treatment:     60   mins    Dania Ledezma PT  [place] License #: [id number]    Physical Therapist

## 2020-09-23 ENCOUNTER — TREATMENT (OUTPATIENT)
Dept: PHYSICAL THERAPY | Facility: CLINIC | Age: 55
End: 2020-09-23

## 2020-09-23 DIAGNOSIS — M25.69 BACK STIFFNESS: ICD-10-CM

## 2020-09-23 DIAGNOSIS — R26.81 DIFFICULTY STANDING: ICD-10-CM

## 2020-09-23 DIAGNOSIS — G89.29 CHRONIC LEFT-SIDED LOW BACK PAIN WITHOUT SCIATICA: Primary | ICD-10-CM

## 2020-09-23 DIAGNOSIS — M54.50 CHRONIC LEFT-SIDED LOW BACK PAIN WITHOUT SCIATICA: Primary | ICD-10-CM

## 2020-09-23 PROCEDURE — 97112 NEUROMUSCULAR REEDUCATION: CPT | Performed by: PHYSICAL THERAPIST

## 2020-09-23 PROCEDURE — 97110 THERAPEUTIC EXERCISES: CPT | Performed by: PHYSICAL THERAPIST

## 2020-09-23 NOTE — PROGRESS NOTES
Physical Therapy Daily Progress Note  Visit: 8    Anabella Baum reports: The back is ok. I still have pain. The heaviness in the legs is gone though. I have had back and forth relief from therapy.     Subjective     Objective   See Exercise, Manual, and Modality Logs for complete treatment.       Assessment & Plan     Assessment  Assessment details: Pt doing well. Will reassess function and objective measures next week to determine need for further therapy. Discussed options such as referral to neurosurgeon, imaging or epidural if sx's do not improve    Plan  Plan details: Reassess next week        Manual Therapy:    -     mins  27468;  Therapeutic Exercise:    42     mins  81559;     Neuromuscular Melissa:    10    mins  46412;    Therapeutic Activity:     -     mins  16180;     Gait Training:      -     mins  94154;     Ultrasound:     -     mins  43212;    Electrical Stimulation:    -     mins  98247 ( );  Dry Needling     -     mins self-pay    Timed Treatment:   52   mins   Total Treatment:     60   mins    Dania Ledezma, PT  [place] License #: [id number]    Physical Therapist

## 2020-09-30 ENCOUNTER — TREATMENT (OUTPATIENT)
Dept: PHYSICAL THERAPY | Facility: CLINIC | Age: 55
End: 2020-09-30

## 2020-09-30 DIAGNOSIS — M25.69 BACK STIFFNESS: ICD-10-CM

## 2020-09-30 DIAGNOSIS — M54.50 CHRONIC LEFT-SIDED LOW BACK PAIN WITHOUT SCIATICA: Primary | ICD-10-CM

## 2020-09-30 DIAGNOSIS — G89.29 CHRONIC LEFT-SIDED LOW BACK PAIN WITHOUT SCIATICA: Primary | ICD-10-CM

## 2020-09-30 DIAGNOSIS — R26.81 DIFFICULTY STANDING: ICD-10-CM

## 2020-09-30 PROCEDURE — 97110 THERAPEUTIC EXERCISES: CPT | Performed by: PHYSICAL THERAPIST

## 2020-09-30 PROCEDURE — 97112 NEUROMUSCULAR REEDUCATION: CPT | Performed by: PHYSICAL THERAPIST

## 2020-09-30 NOTE — PROGRESS NOTES
Physical Therapy Daily Progress Note  Visit: 9    Anabella Baum reports: My back is doing good.     Subjective     Objective   See Exercise, Manual, and Modality Logs for complete treatment.       Assessment & Plan     Assessment  Assessment details: Pt doing great with table and standing exercises in the gym. Will do a formal reassessment next visit to determine need for further therapyt    Plan  Plan details: Reassess next visit        Manual Therapy:    -     mins  17705;  Therapeutic Exercise:    40     mins  42610;     Neuromuscular Melissa:    10    mins  42876;    Therapeutic Activity:     -     mins  54587;     Gait Training:      -     mins  41752;     Ultrasound:     -     mins  41410;    Electrical Stimulation:    -     mins  85899 ( );  Dry Needling     -     mins self-pay    Timed Treatment:   50   mins   Total Treatment:     54   mins    Dania Ledezma PT  [place] License #: [id number]    Physical Therapist

## 2020-10-06 ENCOUNTER — RESULTS ENCOUNTER (OUTPATIENT)
Dept: FAMILY MEDICINE CLINIC | Facility: CLINIC | Age: 55
End: 2020-10-06

## 2020-10-06 ENCOUNTER — TREATMENT (OUTPATIENT)
Dept: PHYSICAL THERAPY | Facility: CLINIC | Age: 55
End: 2020-10-06

## 2020-10-06 DIAGNOSIS — I65.22 STENOSIS OF LEFT CAROTID ARTERY: ICD-10-CM

## 2020-10-06 DIAGNOSIS — R09.89 BRUIT OF LEFT CAROTID ARTERY: ICD-10-CM

## 2020-10-06 DIAGNOSIS — R26.81 DIFFICULTY STANDING: ICD-10-CM

## 2020-10-06 DIAGNOSIS — M54.50 CHRONIC LEFT-SIDED LOW BACK PAIN WITHOUT SCIATICA: Primary | ICD-10-CM

## 2020-10-06 DIAGNOSIS — M25.69 BACK STIFFNESS: ICD-10-CM

## 2020-10-06 DIAGNOSIS — G89.29 CHRONIC LEFT-SIDED LOW BACK PAIN WITHOUT SCIATICA: Primary | ICD-10-CM

## 2020-10-06 DIAGNOSIS — E78.2 MIXED HYPERLIPIDEMIA: ICD-10-CM

## 2020-10-06 PROCEDURE — 97112 NEUROMUSCULAR REEDUCATION: CPT | Performed by: PHYSICAL THERAPIST

## 2020-10-06 PROCEDURE — 97110 THERAPEUTIC EXERCISES: CPT | Performed by: PHYSICAL THERAPIST

## 2020-10-06 PROCEDURE — 97140 MANUAL THERAPY 1/> REGIONS: CPT | Performed by: PHYSICAL THERAPIST

## 2020-10-06 NOTE — PROGRESS NOTES
Physical Therapy Daily Progress Note  Visit: 10    Anabella Baum reports: I had a little flare up after last session. I think it was the bridges on the swiss ball. Overall, the flare ups and the pain is much less. I would say at worst it can sometimes get to 5/10. I did have some pain in the left side of my buttocks after last session    Subjective     Objective          Active Range of Motion     Lumbar   Left lateral flexion: 100 degrees   Right lateral flexion: 100 degrees   Left rotation: 100 degrees   Right rotation: 100 degrees     Additional Active Range of Motion Details  Reported stretch with left rotation     Strength/Myotome Testing     Left Hip   Planes of Motion   Flexion: 5  Abduction: 5  External rotation: 5  Internal rotation: 5    Right Hip   Planes of Motion   Flexion: 5  Abduction: 5  External rotation: 5  Internal rotation: 5      See Exercise, Manual, and Modality Logs for complete treatment.       Assessment & Plan     Assessment  Assessment details: Pt had minor flare-up after last visit. Compared to evaluation, pt has made good improvements in strength and pain levels. Due to her chronic hx of back pain, educated her on positional distraction techniques to help when her back does flare up and when she has increased nerve pain on left. Overall, her sx's have improved, but if sx's continue to limit her everyday activity that we will discuss referral back to MD for possible     Plan  Plan details: Give Back Index and most likely DC next visit        Manual Therapy:    9     mins  23690;  Therapeutic Exercise:    30     mins  62998;     Neuromuscular Melissa:    10    mins  27086;    Therapeutic Activity:     -     mins  63362;     Gait Training:      -     mins  82254;     Ultrasound:     -     mins  95409;    Electrical Stimulation:    -     mins  06421 ( );  Dry Needling     -     mins self-pay    Timed Treatment:   49   mins   Total Treatment:     51   mins    Dania Ledezma PT  [place] License  #: [id number]    Physical Therapist

## 2020-10-08 LAB
ALBUMIN SERPL-MCNC: 4.8 G/DL (ref 3.8–4.9)
ALP SERPL-CCNC: 125 IU/L (ref 39–117)
ALT SERPL-CCNC: 19 IU/L (ref 0–32)
AST SERPL-CCNC: 18 IU/L (ref 0–40)
BILIRUB DIRECT SERPL-MCNC: 0.16 MG/DL (ref 0–0.4)
BILIRUB SERPL-MCNC: 0.5 MG/DL (ref 0–1.2)
CHOLEST SERPL-MCNC: 130 MG/DL (ref 100–199)
HDLC SERPL-MCNC: 40 MG/DL
LDLC SERPL CALC-MCNC: 70 MG/DL (ref 0–99)
TRIGL SERPL-MCNC: 107 MG/DL (ref 0–149)
VLDLC SERPL CALC-MCNC: 20 MG/DL (ref 5–40)

## 2020-10-14 ENCOUNTER — TREATMENT (OUTPATIENT)
Dept: PHYSICAL THERAPY | Facility: CLINIC | Age: 55
End: 2020-10-14

## 2020-10-14 DIAGNOSIS — R26.81 DIFFICULTY STANDING: ICD-10-CM

## 2020-10-14 DIAGNOSIS — M25.69 BACK STIFFNESS: ICD-10-CM

## 2020-10-14 DIAGNOSIS — G89.29 CHRONIC LEFT-SIDED LOW BACK PAIN WITHOUT SCIATICA: Primary | ICD-10-CM

## 2020-10-14 DIAGNOSIS — M54.50 CHRONIC LEFT-SIDED LOW BACK PAIN WITHOUT SCIATICA: Primary | ICD-10-CM

## 2020-10-14 PROCEDURE — 97112 NEUROMUSCULAR REEDUCATION: CPT | Performed by: PHYSICAL THERAPIST

## 2020-10-14 PROCEDURE — 97110 THERAPEUTIC EXERCISES: CPT | Performed by: PHYSICAL THERAPIST

## 2020-10-14 NOTE — PROGRESS NOTES
Physical Therapy Daily Progress Note  Visit: 11    Anabella Baum reports: I am doing better today. Have not had a flare up like last time.    Subjective     Objective   See Exercise, Manual, and Modality Logs for complete treatment.     See last visit for objective tests and measures    Assessment & Plan     Assessment  Assessment details: Pt has made good progress in her lumbar mobility, strength and pain levels. Educated her to continue exercises at home to continue building strength and stability around her lumbar spine. Pt has no further questions or concerns regarding therapy at this time.     Plan  Plan details: DC at this time        Manual Therapy:    -     mins  77445;  Therapeutic Exercise:    30     mins  57051;     Neuromuscular Melissa:    10    mins  66966;    Therapeutic Activity:     -     mins  82179;     Gait Training:      -     mins  99438;     Ultrasound:     -     mins  74638;    Electrical Stimulation:    -     mins  10018 ( );  Dry Needling     -     mins self-pay    Timed Treatment:   40   mins   Total Treatment:     42   mins    Dania Ledezma PT  [place] License #: [id number]    Physical Therapist

## 2020-10-27 ENCOUNTER — OFFICE VISIT (OUTPATIENT)
Dept: FAMILY MEDICINE CLINIC | Facility: CLINIC | Age: 55
End: 2020-10-27

## 2020-10-27 VITALS
TEMPERATURE: 97.3 F | SYSTOLIC BLOOD PRESSURE: 130 MMHG | HEIGHT: 64 IN | WEIGHT: 158 LBS | BODY MASS INDEX: 26.98 KG/M2 | DIASTOLIC BLOOD PRESSURE: 80 MMHG | HEART RATE: 97 BPM

## 2020-10-27 DIAGNOSIS — R13.12 OROPHARYNGEAL DYSPHAGIA: ICD-10-CM

## 2020-10-27 DIAGNOSIS — K21.00 GASTROESOPHAGEAL REFLUX DISEASE WITH ESOPHAGITIS WITHOUT HEMORRHAGE: ICD-10-CM

## 2020-10-27 DIAGNOSIS — E55.9 VITAMIN D DEFICIENCY: ICD-10-CM

## 2020-10-27 DIAGNOSIS — Z23 NEED FOR INFLUENZA VACCINATION: ICD-10-CM

## 2020-10-27 DIAGNOSIS — L08.9 INFECTED SEBACEOUS CYST: ICD-10-CM

## 2020-10-27 DIAGNOSIS — I10 BENIGN ESSENTIAL HTN: Primary | ICD-10-CM

## 2020-10-27 DIAGNOSIS — L72.3 INFECTED SEBACEOUS CYST: ICD-10-CM

## 2020-10-27 DIAGNOSIS — R09.89 BRUIT OF LEFT CAROTID ARTERY: ICD-10-CM

## 2020-10-27 DIAGNOSIS — E78.2 MIXED HYPERLIPIDEMIA: ICD-10-CM

## 2020-10-27 PROCEDURE — 90686 IIV4 VACC NO PRSV 0.5 ML IM: CPT | Performed by: PHYSICIAN ASSISTANT

## 2020-10-27 PROCEDURE — 99214 OFFICE O/P EST MOD 30 MIN: CPT | Performed by: PHYSICIAN ASSISTANT

## 2020-10-27 PROCEDURE — 90471 IMMUNIZATION ADMIN: CPT | Performed by: PHYSICIAN ASSISTANT

## 2020-10-27 RX ORDER — LISINOPRIL 10 MG/1
10 TABLET ORAL DAILY
Qty: 90 TABLET | Refills: 1 | Status: SHIPPED | OUTPATIENT
Start: 2020-10-27 | End: 2020-11-10 | Stop reason: SDUPTHER

## 2020-10-27 RX ORDER — FLUCONAZOLE 150 MG/1
150 TABLET ORAL ONCE
Qty: 1 TABLET | Refills: 2 | Status: SHIPPED | OUTPATIENT
Start: 2020-10-27 | End: 2020-10-27

## 2020-10-27 RX ORDER — CEFDINIR 300 MG/1
CAPSULE ORAL
Qty: 20 CAPSULE | Refills: 0 | Status: SHIPPED | OUTPATIENT
Start: 2020-10-27 | End: 2020-12-30 | Stop reason: ALTCHOICE

## 2020-10-27 RX ORDER — LANSOPRAZOLE 30 MG/1
30 CAPSULE, DELAYED RELEASE ORAL 2 TIMES DAILY
Qty: 180 CAPSULE | Refills: 3 | Status: SHIPPED | OUTPATIENT
Start: 2020-10-27 | End: 2020-10-31

## 2020-10-27 NOTE — PROGRESS NOTES
"Subjective   Anabella Baum is a 55 y.o. female.     History of Present Illness   .   Since the last visit, she has overall felt well.  She has Essential Hypertension and well controlled on current medication, GERD controlled on PPI Rx, Hyperlipidemia with goals met with current Rx and Vitamin D deficiency and labs are at goal >30 ng/mL.  she has been compliant with current medications have reviewed them.  The patient denies medication side effects.  Will refill medications. /80   Pulse 97   Temp 97.3 °F (36.3 °C)   Ht 162.6 cm (64.02\")   Wt 71.7 kg (158 lb)   LMP  (LMP Unknown)   BMI 27.11 kg/m² Today is follow-up for increasing lisinopril last visit.  Blood pressure is now controlled with 10 mg lisinopril and will continue same dose  Patient is taking B12 supplementation  Note her LDL cholesterol last labs was at 70 and goal is to be less than 70 but will continue same higher dose of atorvastatin that I changed to last visit at 40 mg  Patient continues to smoke and advised to stop smoking  Low-dose CT of the chest has been ordered due to her smoking history to screen for lung cancer  Patient has a prescription for Wellbutrin that she has taken in the past without difficulty and no history of seizure disorder.  She will start this when she is ready to work on stopping smoking  Results for orders placed or performed in visit on 10/06/20   Hepatic Function Panel (6) ([facility])    Specimen: Blood   Result Value Ref Range    Albumin 4.8 3.8 - 4.9 g/dL    Total Bilirubin 0.5 0.0 - 1.2 mg/dL    Bilirubin, Direct 0.16 0.00 - 0.40 mg/dL    Alkaline Phosphatase 125 (H) 39 - 117 IU/L    AST (SGOT) 18 0 - 40 IU/L    ALT (SGPT) 19 0 - 32 IU/L   Lipid Panel    Specimen: Blood   Result Value Ref Range    Total Cholesterol 130 100 - 199 mg/dL    Triglycerides 107 0 - 149 mg/dL    HDL Cholesterol 40 >39 mg/dL    VLDL Cholesterol Tyler 20 5 - 40 mg/dL    LDL Chol Calc (NIH) 70 0 - 99 mg/dL   left axilla lump " yesterday    Has not started Wellbutrin yet to stop smoking    Lump left arm; --see photo  Some dysphagia for mos    The following portions of the patient's history were reviewed and updated as appropriate: allergies, current medications, past family history, past medical history, past social history, past surgical history and problem list.    Review of Systems   Constitutional: Negative for activity change, appetite change and unexpected weight change.   HENT: Positive for congestion, ear pain and trouble swallowing. Negative for nosebleeds.    Eyes: Negative for pain and visual disturbance.   Respiratory: Positive for cough. Negative for chest tightness, shortness of breath and wheezing.    Cardiovascular: Negative for chest pain and palpitations.   Gastrointestinal: Negative for abdominal pain and blood in stool.   Endocrine: Negative.    Genitourinary: Negative for difficulty urinating and hematuria.       Musculoskeletal: Positive for back pain. Negative for joint swelling.   Skin: Negative for color change and rash.   Allergic/Immunologic: Negative.    Neurological: Negative for syncope and speech difficulty.   Hematological: Negative for adenopathy.   Psychiatric/Behavioral: Negative for agitation and confusion.   All other systems reviewed and are negative.      Objective   Physical Exam  Vitals signs and nursing note reviewed.   Constitutional:       General: She is not in acute distress.     Appearance: Normal appearance. She is well-developed. She is not ill-appearing, toxic-appearing or diaphoretic.   HENT:      Head: Normocephalic.      Right Ear: External ear normal.      Left Ear: External ear normal.      Nose: Nose normal.      Mouth/Throat:      Pharynx: Oropharynx is clear.   Eyes:      General: No scleral icterus.     Conjunctiva/sclera: Conjunctivae normal.      Pupils: Pupils are equal, round, and reactive to light.   Neck:      Musculoskeletal: Normal range of motion and neck supple.       Thyroid: No thyromegaly.      Vascular: Carotid bruit (left) present.   Cardiovascular:      Rate and Rhythm: Normal rate and regular rhythm.      Pulses: Normal pulses.      Heart sounds: Normal heart sounds. No murmur.   Pulmonary:      Effort: Pulmonary effort is normal. No respiratory distress.      Breath sounds: Normal breath sounds. No rhonchi.   Musculoskeletal: Normal range of motion.         General: No deformity.      Right lower leg: No edema.      Left lower leg: No edema.   Skin:     General: Skin is warm and dry.      Coloration: Skin is not jaundiced.      Comments: Left axilla with domed pink papule about 1 cm.  Area is very tender and dark pink in color.  No surrounding erythema.  No opening in the center but still concerned could be a sebaceous cyst with secondary infection.  Differential also includes enlarged lymph node.  She has no other lymphadenopathy palpable around this area and no palpable lymphadenopathy in her neck clavicle or right axilla   Neurological:      General: No focal deficit present.      Mental Status: She is alert and oriented to person, place, and time. Mental status is at baseline.   Psychiatric:         Mood and Affect: Mood normal.         Behavior: Behavior normal.         Thought Content: Thought content normal.         Judgment: Judgment normal.         Assessment/Plan   Diagnoses and all orders for this visit:    1. Benign essential HTN (Primary)    2. Need for influenza vaccination  -     Fluarix/Fluzone/Afluria Quad>6 Months    3. Gastroesophageal reflux disease with esophagitis without hemorrhage  -     Ambulatory Referral to Gastroenterology    4. Mixed hyperlipidemia    5. Vitamin D deficiency    6. Bruit of left carotid artery    7. Infected sebaceous cyst    8. Oropharyngeal dysphagia  -     Ambulatory Referral to Gastroenterology    Other orders  -     lansoprazole (PREVACID) 30 MG capsule; Take 1 capsule by mouth 2 (two) times a day.  Dispense: 180 capsule;  Refill: 3  -     cefdinir (OMNICEF) 300 MG capsule; One cap PO BID for infection  Dispense: 20 capsule; Refill: 0  -     fluconazole (Diflucan) 150 MG tablet; Take 1 tablet by mouth 1 (One) Time for 1 dose. One PO X 1 for yeast infection  Dispense: 1 tablet; Refill: 2  -     lisinopril (PRINIVIL,ZESTRIL) 10 MG tablet; Take 1 tablet by mouth Daily. For BP  Dispense: 90 tablet; Refill: 1      Doxycycline for left axillary infection--follow-up if does not resolve or gets worse  Increase Prevacid BID due to sensation of trouble swallowing and have her see GI may need EGD  Stop smoking  Continue diet and exercise in LDL goal of less than 70  Plan, Anabella Baum, was seen today.  she was seen for HTN and continue medication, Hyperlipidemia and will continue current medication and Vitamin D deficiency and supplemented.

## 2020-10-31 RX ORDER — LANSOPRAZOLE 30 MG/1
CAPSULE, DELAYED RELEASE ORAL
Qty: 90 CAPSULE | Refills: 3 | Status: SHIPPED | OUTPATIENT
Start: 2020-10-31 | End: 2021-09-24 | Stop reason: SDUPTHER

## 2020-11-04 ENCOUNTER — OFFICE VISIT (OUTPATIENT)
Dept: GASTROENTEROLOGY | Facility: CLINIC | Age: 55
End: 2020-11-04

## 2020-11-04 VITALS
BODY MASS INDEX: 26.89 KG/M2 | HEIGHT: 64 IN | SYSTOLIC BLOOD PRESSURE: 118 MMHG | WEIGHT: 157.5 LBS | DIASTOLIC BLOOD PRESSURE: 76 MMHG

## 2020-11-04 DIAGNOSIS — K21.9 GASTROESOPHAGEAL REFLUX DISEASE, UNSPECIFIED WHETHER ESOPHAGITIS PRESENT: ICD-10-CM

## 2020-11-04 DIAGNOSIS — R13.19 ESOPHAGEAL DYSPHAGIA: Primary | ICD-10-CM

## 2020-11-04 PROCEDURE — 99204 OFFICE O/P NEW MOD 45 MIN: CPT | Performed by: INTERNAL MEDICINE

## 2020-11-04 RX ORDER — SODIUM CHLORIDE, SODIUM LACTATE, POTASSIUM CHLORIDE, CALCIUM CHLORIDE 600; 310; 30; 20 MG/100ML; MG/100ML; MG/100ML; MG/100ML
30 INJECTION, SOLUTION INTRAVENOUS CONTINUOUS
Status: CANCELLED | OUTPATIENT
Start: 2020-11-23

## 2020-11-04 RX ORDER — SUCRALFATE ORAL 1 G/10ML
1 SUSPENSION ORAL
Qty: 420 ML | Refills: 3 | Status: SHIPPED | OUTPATIENT
Start: 2020-11-04 | End: 2020-12-30 | Stop reason: ALTCHOICE

## 2020-11-04 NOTE — H&P (VIEW-ONLY)
Chief Complaint   Patient presents with   • Difficulty Swallowing       Subjective     HPI    Anabella Baum is a 55 y.o. female with a past medical history noted below who presents for evaluation of GERD and dysphagia.  GERD has been present for many years.  She has manages with lansoprazole.  It keeps it fairly stable.  However, she has had a new development of issues with swallowing.  This has been present for the past month or so.  She is noticing foods and medications sticking.  Mostly in her cervical esophagus region.  Nothing is ever stuck for a long period of time.  The food does eventually go down.  She has not subsequently avoiding any particular foods.  She does feel some associated globus in the back of her throat.  He is not avoiding any particular foods.  She has not had any weight loss due to symptoms.  She does have symptoms of a sore throat at times, not severe.    Colonoscopy and EGD with Dr Gutierrez about 5 years ago.  She reports these tests were normal and that she was told to stay on the reflux medication.    No family hx of GI malignancy    Hx of veronica    Smokes 1ppd.  Occasional ETOH.      Today's visit was in the office.  Both the patient and I were wearing face masks and proper hand hygiene was performed before and after the physical exam.       Past Medical History:   Diagnosis Date   • Allergic reaction    • Bursitis of right hip    • Carotid bruit    • CTS (carpal tunnel syndrome)     left   • GERD (gastroesophageal reflux disease)    • History of CT scan of abdomen 2011    and pelvis without contrast; left 5mm renal stone with hydronephrosis   • History of Doppler ultrasound 2013    carotid; right is neg; left internal carotid with 1-15% plaque and in left bifurcation; has patent blood flow   • HLD (hyperlipidemia)    • Kidney calculus    • Quadruplet gestation    • Rash, skin    • SAB (spontaneous )    • Sprain, strain    • Vitamin D deficiency          Current  Outpatient Medications:   •  albuterol (ProAir RespiClick) 108 (90 Base) MCG/ACT inhaler, Inhale 2 puffs Every 6 (Six) Hours As Needed for Wheezing or Shortness of Air., Disp: 1 inhaler, Rfl: 11  •  aspirin 81 MG tablet, Take 81 mg by mouth daily., Disp: , Rfl:   •  atorvastatin (Lipitor) 40 MG tablet, Take 1 tablet by mouth Daily. For cholesterol, Disp: 90 tablet, Rfl: 3  •  buPROPion SR (WELLBUTRIN SR) 100 MG 12 hr tablet, Take 1 tablet by mouth Every 12 (Twelve) Hours., Disp: 60 tablet, Rfl: 5  •  cefdinir (OMNICEF) 300 MG capsule, One cap PO BID for infection, Disp: 20 capsule, Rfl: 0  •  Cholecalciferol (VITAMIN D) 2000 units tablet, 2 PO daily, Disp: 60 tablet, Rfl: 11  •  Cyanocobalamin (VITAMIN B-12) 1000 MCG sublingual tablet, One SL daily, Disp: 90 each, Rfl: 3  •  lansoprazole (PREVACID) 30 MG capsule, TAKE 1 CAPSULE DAILY FOR GASTROESOPHAGEAL REFLUX DISEASE, Disp: 90 capsule, Rfl: 3  •  lisinopril (PRINIVIL,ZESTRIL) 10 MG tablet, Take 1 tablet by mouth Daily. For BP, Disp: 90 tablet, Rfl: 1  •  Multiple Vitamins-Minerals (MULTIVITAMIN WITH MINERALS) tablet, Take 1 tablet by mouth daily., Disp: , Rfl:   •  tiZANidine (ZANAFLEX) 4 MG tablet, TAKE 1/2 TO 1 TABLET BY MOUTH EVERY 8 HOURS AS NEEDED FOR MUSCLE SPASMS, Disp: 45 tablet, Rfl: 1  •  sucralfate (Carafate) 1 GM/10ML suspension, Take 10 mL by mouth 3 (Three) Times a Day Before Meals., Disp: 420 mL, Rfl: 3    Allergies   Allergen Reactions   • Augmentin [Amoxicillin-Pot Clavulanate] GI Intolerance     No rash; can take cephalosporin   • Levaquin [Levofloxacin] Other (See Comments)     Joint pain   • Ibuprofen Rash       Social History     Socioeconomic History   • Marital status: Unknown     Spouse name: Not on file   • Number of children: Not on file   • Years of education: Not on file   • Highest education level: Not on file   Occupational History   • Occupation: hospital manger    Tobacco Use   • Smoking status: Current Every Day Smoker      Packs/day: 1.00     Years: 35.00     Pack years: 35.00     Types: Cigarettes   • Smokeless tobacco: Never Used   • Tobacco comment: rash with e cig   Substance and Sexual Activity   • Alcohol use: Yes     Comment: occ   • Drug use: No   • Sexual activity: Yes     Partners: Male     Birth control/protection: Partner's vasectomy       Family History   Problem Relation Age of Onset   • Heart disease Mother    • Hypertension Mother    • Lung disease Mother    • COPD Mother            • Diabetes Mother            • Hyperlipidemia Mother    • Kidney disease Mother            • Vision loss Mother    • Anxiety disorder Sister    • Hypertension Sister    • Hyperlipidemia Sister    • Breast cancer Sister 47        genetic testing neg    • Cancer Sister    • Breast cancer Maternal Aunt 65        genetic testing negative    • Thyroid disease Maternal Aunt         Hashimoto   • Colon cancer Cousin 40        maternal    • Arthritis Sister    • Hyperlipidemia Sister    • Hyperlipidemia Sister    • Thyroid disease Maternal Aunt         Hashimoto       Review of Systems   Constitutional: Negative for activity change, appetite change and fatigue.   HENT: Positive for sore throat and trouble swallowing.    Respiratory: Positive for cough. Negative for shortness of breath, wheezing and stridor.    Cardiovascular: Negative.    Gastrointestinal: Negative for abdominal distention, abdominal pain and blood in stool.        GERD   Endocrine: Negative for cold intolerance and heat intolerance.   Genitourinary: Negative for difficulty urinating, dysuria and frequency.   Musculoskeletal: Positive for back pain. Negative for arthralgias and myalgias.   Skin: Negative.    Hematological: Negative for adenopathy. Does not bruise/bleed easily.   All other systems reviewed and are negative.      Objective     Vitals:    20 1512   BP: 118/76         20  1512   Weight: 71.4 kg (157 lb 8 oz)     Body mass index is  27.03 kg/m².    Physical Exam  Vitals signs reviewed.   Constitutional:       General: She is not in acute distress.     Appearance: Normal appearance. She is well-developed. She is not diaphoretic.   HENT:      Head: Normocephalic and atraumatic.      Right Ear: External ear normal.      Left Ear: External ear normal.      Nose: Nose normal.   Eyes:      General: No scleral icterus.        Right eye: No discharge.         Left eye: No discharge.      Conjunctiva/sclera: Conjunctivae normal.   Neck:      Musculoskeletal: Normal range of motion and neck supple.      Thyroid: No thyromegaly.      Comments: No supraclavicular adenopathy  Cardiovascular:      Rate and Rhythm: Normal rate and regular rhythm.      Heart sounds: Normal heart sounds. No murmur. No gallop.       Comments: No lower extremity edema  Pulmonary:      Effort: Pulmonary effort is normal. No respiratory distress.      Breath sounds: Normal breath sounds. No wheezing.   Abdominal:      General: Bowel sounds are normal. There is no distension.      Palpations: Abdomen is soft. Abdomen is not rigid. There is no mass.      Tenderness: There is no abdominal tenderness. There is no guarding or rebound.      Hernia: No hernia is present.   Genitourinary:     Comments: Rectal exam deferred  Musculoskeletal: Normal range of motion.         General: No tenderness.      Comments: No atrophy of upper or lower extremities.  Normal digits and nails of both hands.   Lymphadenopathy:      Cervical: No cervical adenopathy.   Skin:     General: Skin is warm and dry.      Findings: No erythema or rash.   Neurological:      Mental Status: She is alert and oriented to person, place, and time.      Motor: No atrophy.      Coordination: Coordination normal.   Psychiatric:         Behavior: Behavior normal.         Thought Content: Thought content normal.         Judgment: Judgment normal.         WBC   Date Value Ref Range Status   08/20/2020 9.2 3.4 - 10.8 x10E3/uL  Final     RBC   Date Value Ref Range Status   08/20/2020 4.59 3.77 - 5.28 x10E6/uL Final     Hemoglobin   Date Value Ref Range Status   08/20/2020 14.6 11.1 - 15.9 g/dL Final   08/02/2017 13.9 11.9 - 15.5 g/dL Final     Hematocrit   Date Value Ref Range Status   08/20/2020 41.8 34.0 - 46.6 % Final   08/02/2017 40.8 35.6 - 45.5 % Final     MCV   Date Value Ref Range Status   08/20/2020 91 79 - 97 fL Final   08/02/2017 94.0 80.5 - 98.2 fL Final     MCH   Date Value Ref Range Status   08/20/2020 31.8 26.6 - 33.0 pg Final   08/02/2017 32.0 26.9 - 32.0 pg Final     MCHC   Date Value Ref Range Status   08/20/2020 34.9 31.5 - 35.7 g/dL Final   08/02/2017 34.1 32.4 - 36.3 g/dL Final     RDW   Date Value Ref Range Status   08/20/2020 12.0 11.7 - 15.4 % Final   08/02/2017 13.1 (H) 11.7 - 13.0 % Final     RDW-SD   Date Value Ref Range Status   08/02/2017 45.2 37.0 - 54.0 fl Final     MPV   Date Value Ref Range Status   08/02/2017 10.8 6.0 - 12.0 fL Final     Platelets   Date Value Ref Range Status   08/20/2020 244 150 - 450 x10E3/uL Final   08/02/2017 191 140 - 500 10*3/mm3 Final     Neutrophil Rel %   Date Value Ref Range Status   08/20/2020 67 Not Estab. % Final     Lymphocyte Rel %   Date Value Ref Range Status   08/20/2020 22 Not Estab. % Final     Monocyte Rel %   Date Value Ref Range Status   08/20/2020 9 Not Estab. % Final     Eosinophil Rel %   Date Value Ref Range Status   08/20/2020 2 Not Estab. % Final     Basophil Rel %   Date Value Ref Range Status   08/20/2020 0 Not Estab. % Final     Neutrophils Absolute   Date Value Ref Range Status   08/20/2020 6.2 1.4 - 7.0 x10E3/uL Final     Lymphocytes Absolute   Date Value Ref Range Status   08/20/2020 2.0 0.7 - 3.1 x10E3/uL Final     Monocytes Absolute   Date Value Ref Range Status   08/20/2020 0.8 0.1 - 0.9 x10E3/uL Final     Eosinophils Absolute   Date Value Ref Range Status   08/20/2020 0.2 0.0 - 0.4 x10E3/uL Final     Basophils Absolute   Date Value Ref Range Status    08/20/2020 0.0 0.0 - 0.2 x10E3/uL Final       Glucose   Date Value Ref Range Status   08/02/2017 72 65 - 99 mg/dL Final     Sodium   Date Value Ref Range Status   08/20/2020 141 134 - 144 mmol/L Final   08/02/2017 139 136 - 145 mmol/L Final     Potassium   Date Value Ref Range Status   08/20/2020 4.6 3.5 - 5.2 mmol/L Final   08/02/2017 3.9 3.5 - 5.2 mmol/L Final     CO2   Date Value Ref Range Status   08/02/2017 26.3 22.0 - 29.0 mmol/L Final     Total CO2   Date Value Ref Range Status   08/20/2020 27 20 - 29 mmol/L Final     Chloride   Date Value Ref Range Status   08/20/2020 101 96 - 106 mmol/L Final   08/02/2017 101 98 - 107 mmol/L Final     Anion Gap   Date Value Ref Range Status   08/02/2017 11.7 mmol/L Final     Creatinine   Date Value Ref Range Status   08/20/2020 0.72 0.57 - 1.00 mg/dL Final   08/02/2017 0.66 0.57 - 1.00 mg/dL Final     BUN   Date Value Ref Range Status   08/20/2020 15 6 - 24 mg/dL Final   08/02/2017 11 6 - 20 mg/dL Final     BUN/Creatinine Ratio   Date Value Ref Range Status   08/20/2020 21 9 - 23 Final   08/02/2017 16.7 7.0 - 25.0 Final     Calcium   Date Value Ref Range Status   08/20/2020 9.7 8.7 - 10.2 mg/dL Final   08/02/2017 9.7 8.6 - 10.5 mg/dL Final     eGFR Non  Am   Date Value Ref Range Status   08/20/2020 94 >59 mL/min/1.73 Final     eGFR Non  Amer   Date Value Ref Range Status   08/02/2017 94 >60 mL/min/1.73 Final     Alkaline Phosphatase   Date Value Ref Range Status   10/07/2020 125 (H) 39 - 117 IU/L Final     ALT (SGPT)   Date Value Ref Range Status   10/07/2020 19 0 - 32 IU/L Final     AST (SGOT)   Date Value Ref Range Status   10/07/2020 18 0 - 40 IU/L Final     Total Bilirubin   Date Value Ref Range Status   10/07/2020 0.5 0.0 - 1.2 mg/dL Final     Albumin   Date Value Ref Range Status   10/07/2020 4.8 3.8 - 4.9 g/dL Final     A/G Ratio   Date Value Ref Range Status   08/20/2020 2.1 1.2 - 2.2 Final         Imaging Results (Last 7 Days)     ** No results  found for the last 168 hours. **            No notes on file    Assessment/Plan    1.  Dysphagia: New issue.  Concerning with her longstanding GERD history as well as smoking history.  No recent EGD    2.  GERD: Chronic issue.  She has been on PPI for a number of years    Plan  Continue PPI for now  Carafate 3 times daily with meals  Advised caution with foods, to ER for any food sticking greater than an hour  EGD for further evaluation of her symptoms    Diagnoses and all orders for this visit:    1. Esophageal dysphagia (Primary)  -     Case Request; Standing  -     Follow Anesthesia Guidelines / Standing Orders; Future  -     Obtain Informed Consent; Future  -     Implement Anesthesia Orders Day of Procedure; Standing  -     Obtain Informed Consent; Standing  -     lactated ringers infusion  -     Case Request    2. Gastroesophageal reflux disease, unspecified whether esophagitis present  -     Case Request; Standing  -     Follow Anesthesia Guidelines / Standing Orders; Future  -     Obtain Informed Consent; Future  -     Implement Anesthesia Orders Day of Procedure; Standing  -     Obtain Informed Consent; Standing  -     lactated ringers infusion  -     Case Request    Other orders  -     sucralfate (Carafate) 1 GM/10ML suspension; Take 10 mL by mouth 3 (Three) Times a Day Before Meals.  Dispense: 420 mL; Refill: 3        I have discussed the above plan with the patient.  They verbalize understanding and are in agreement with the plan.  They have been advised to contact the office for any questions, concerns, or changes related to their health.    Dictated utilizing Dragon dictation

## 2020-11-06 ENCOUNTER — TRANSCRIBE ORDERS (OUTPATIENT)
Dept: SLEEP MEDICINE | Facility: HOSPITAL | Age: 55
End: 2020-11-06

## 2020-11-06 DIAGNOSIS — Z01.818 OTHER SPECIFIED PRE-OPERATIVE EXAMINATION: Primary | ICD-10-CM

## 2020-11-10 RX ORDER — ATORVASTATIN CALCIUM 40 MG/1
40 TABLET, FILM COATED ORAL DAILY
Qty: 90 TABLET | Refills: 3 | Status: SHIPPED | OUTPATIENT
Start: 2020-11-10 | End: 2021-09-24 | Stop reason: SDUPTHER

## 2020-11-10 RX ORDER — LISINOPRIL 10 MG/1
10 TABLET ORAL DAILY
Qty: 90 TABLET | Refills: 1 | Status: SHIPPED | OUTPATIENT
Start: 2020-11-10 | End: 2021-04-16

## 2020-11-10 RX ORDER — MAGNESIUM 200 MG
TABLET ORAL
Qty: 90 EACH | Refills: 3 | Status: SHIPPED | OUTPATIENT
Start: 2020-11-10 | End: 2021-10-28

## 2020-11-20 ENCOUNTER — LAB (OUTPATIENT)
Dept: LAB | Facility: HOSPITAL | Age: 55
End: 2020-11-20

## 2020-11-20 DIAGNOSIS — Z01.818 OTHER SPECIFIED PRE-OPERATIVE EXAMINATION: ICD-10-CM

## 2020-11-20 PROCEDURE — U0004 COV-19 TEST NON-CDC HGH THRU: HCPCS

## 2020-11-20 PROCEDURE — C9803 HOPD COVID-19 SPEC COLLECT: HCPCS

## 2020-11-21 LAB — SARS-COV-2 RNA RESP QL NAA+PROBE: NOT DETECTED

## 2020-11-23 ENCOUNTER — ANESTHESIA EVENT (OUTPATIENT)
Dept: GASTROENTEROLOGY | Facility: HOSPITAL | Age: 55
End: 2020-11-23

## 2020-11-23 ENCOUNTER — HOSPITAL ENCOUNTER (OUTPATIENT)
Facility: HOSPITAL | Age: 55
Setting detail: HOSPITAL OUTPATIENT SURGERY
Discharge: HOME | End: 2020-11-23
Attending: INTERNAL MEDICINE | Admitting: INTERNAL MEDICINE

## 2020-11-23 ENCOUNTER — ANESTHESIA (OUTPATIENT)
Dept: GASTROENTEROLOGY | Facility: HOSPITAL | Age: 55
End: 2020-11-23

## 2020-11-23 VITALS
SYSTOLIC BLOOD PRESSURE: 133 MMHG | BODY MASS INDEX: 26.8 KG/M2 | WEIGHT: 157 LBS | OXYGEN SATURATION: 98 % | HEART RATE: 97 BPM | HEIGHT: 64 IN | DIASTOLIC BLOOD PRESSURE: 86 MMHG | RESPIRATION RATE: 16 BRPM | TEMPERATURE: 97.2 F

## 2020-11-23 DIAGNOSIS — K21.9 GASTROESOPHAGEAL REFLUX DISEASE, UNSPECIFIED WHETHER ESOPHAGITIS PRESENT: ICD-10-CM

## 2020-11-23 DIAGNOSIS — R13.19 ESOPHAGEAL DYSPHAGIA: ICD-10-CM

## 2020-11-23 PROCEDURE — 43239 EGD BIOPSY SINGLE/MULTIPLE: CPT | Performed by: INTERNAL MEDICINE

## 2020-11-23 PROCEDURE — 88305 TISSUE EXAM BY PATHOLOGIST: CPT | Performed by: INTERNAL MEDICINE

## 2020-11-23 PROCEDURE — 25010000002 PROPOFOL 10 MG/ML EMULSION: Performed by: NURSE ANESTHETIST, CERTIFIED REGISTERED

## 2020-11-23 PROCEDURE — 43450 DILATE ESOPHAGUS 1/MULT PASS: CPT | Performed by: INTERNAL MEDICINE

## 2020-11-23 RX ORDER — SODIUM CHLORIDE, SODIUM LACTATE, POTASSIUM CHLORIDE, CALCIUM CHLORIDE 600; 310; 30; 20 MG/100ML; MG/100ML; MG/100ML; MG/100ML
30 INJECTION, SOLUTION INTRAVENOUS CONTINUOUS
Status: DISCONTINUED | OUTPATIENT
Start: 2020-11-23 | End: 2020-11-23 | Stop reason: HOSPADM

## 2020-11-23 RX ORDER — PROPOFOL 10 MG/ML
VIAL (ML) INTRAVENOUS AS NEEDED
Status: DISCONTINUED | OUTPATIENT
Start: 2020-11-23 | End: 2020-11-23 | Stop reason: SURG

## 2020-11-23 RX ORDER — GLYCOPYRROLATE 0.2 MG/ML
INJECTION INTRAMUSCULAR; INTRAVENOUS AS NEEDED
Status: DISCONTINUED | OUTPATIENT
Start: 2020-11-23 | End: 2020-11-23 | Stop reason: SURG

## 2020-11-23 RX ORDER — PROPOFOL 10 MG/ML
VIAL (ML) INTRAVENOUS CONTINUOUS PRN
Status: DISCONTINUED | OUTPATIENT
Start: 2020-11-23 | End: 2020-11-23 | Stop reason: SURG

## 2020-11-23 RX ORDER — LIDOCAINE HYDROCHLORIDE 20 MG/ML
INJECTION, SOLUTION INFILTRATION; PERINEURAL AS NEEDED
Status: DISCONTINUED | OUTPATIENT
Start: 2020-11-23 | End: 2020-11-23 | Stop reason: SURG

## 2020-11-23 RX ADMIN — PROPOFOL 80 MG: 10 INJECTION, EMULSION INTRAVENOUS at 13:55

## 2020-11-23 RX ADMIN — GLYCOPYRROLATE 0.2 MG: 0.2 INJECTION INTRAMUSCULAR; INTRAVENOUS at 13:50

## 2020-11-23 RX ADMIN — PROPOFOL 250 MCG/KG/MIN: 10 INJECTION, EMULSION INTRAVENOUS at 13:55

## 2020-11-23 RX ADMIN — SODIUM CHLORIDE, POTASSIUM CHLORIDE, SODIUM LACTATE AND CALCIUM CHLORIDE 30 ML/HR: 600; 310; 30; 20 INJECTION, SOLUTION INTRAVENOUS at 13:12

## 2020-11-23 RX ADMIN — LIDOCAINE HYDROCHLORIDE 100 MG: 20 INJECTION, SOLUTION INFILTRATION; PERINEURAL at 13:55

## 2020-11-23 NOTE — ANESTHESIA POSTPROCEDURE EVALUATION
"Patient: Anabella Baum    Procedure Summary     Date: 11/23/20 Room / Location:  [facility]    Anesthesia Start: 1346 Anesthesia Stop: 1415    Procedure: ESOPHAGOGASTRODUODENOSCOPY WITH COLD BIOPSIES, ARIAS DILATION 46FR (N/A Esophagus) Diagnosis:       Gastroesophageal reflux disease, unspecified whether esophagitis present      Esophageal dysphagia      (Gastroesophageal reflux disease, unspecified whether esophagitis present [K21.9])      (Esophageal dysphagia [R13.10])    Surgeon: Maite Rivera MD Provider: Bao Enriquez MD    Anesthesia Type: MAC ASA Status: 3          Anesthesia Type: MAC    Vitals  Vitals Value Taken Time   /86 11/23/20 1436   Temp     Pulse 97 11/23/20 1436   Resp 16 11/23/20 1436   SpO2 98 % 11/23/20 1436           Post Anesthesia Care and Evaluation    Patient location during evaluation: PACU  Patient participation: complete - patient participated  Level of consciousness: awake  Pain score: 0  Pain management: adequate  Airway patency: patent  Anesthetic complications: No anesthetic complications  PONV Status: none  Cardiovascular status: acceptable  Respiratory status: acceptable  Hydration status: acceptable    Comments: /86 (BP Location: Left arm, Patient Position: Sitting)   Pulse 97   Temp 36.2 °C (97.2 °F) (Oral)   Resp 16   Ht 162.6 cm (64\")   Wt 71.2 kg (157 lb)   LMP  (LMP Unknown)   SpO2 98%   BMI 26.95 kg/m²       "

## 2020-11-23 NOTE — ANESTHESIA PREPROCEDURE EVALUATION
Anesthesia Evaluation     Patient summary reviewed and Nursing notes reviewed                Airway   Mallampati: I  TM distance: >3 FB  Neck ROM: full  No difficulty expected  Dental - normal exam     Pulmonary - normal exam   (+) a smoker Current,   Cardiovascular - normal exam    (+) hypertension, hyperlipidemia,  carotid artery disease      Neuro/Psych- negative ROS  GI/Hepatic/Renal/Endo    (+)  GERD,  renal disease stones,     Musculoskeletal (-) negative ROS    Abdominal  - normal exam    Bowel sounds: normal.   Substance History - negative use     OB/GYN    (+) Pregnant,         Other                        Anesthesia Plan    ASA 3     MAC       Anesthetic plan, all risks, benefits, and alternatives have been provided, discussed and informed consent has been obtained with: patient.

## 2020-11-23 NOTE — DISCHARGE INSTRUCTIONS
For the next 24 hours patient needs to be with a responsible adult.    For 24 hours DO NOT drive, operate machinery, appliances, drink alcohol, make important decisions or sign legal documents.    Start with a light or bland diet if you are feeling sick to your stomach otherwise advance to regular diet as tolerated.    Follow recommendations on procedure report if provided by your doctor.    Call Dr Rivera for problems [phone]    Problems may include but not limited to: large amounts of bleeding, trouble breathing, repeated vomiting, severe unrelieved pain, fever or chills.

## 2020-11-24 LAB
LAB AP CASE REPORT: NORMAL
PATH REPORT.FINAL DX SPEC: NORMAL
PATH REPORT.GROSS SPEC: NORMAL

## 2020-11-27 NOTE — PROGRESS NOTES
Biopsies from the EGD show mild inflammation of the stomach body, normal midesophagus.  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  No dysplasia    She should continue the lansoprazole every day to prevent progression of the Pelayo's esophagus.    Please place in 3-year EGD and colonoscopy recall, thanks    Office follow-up in 6 months, thanks

## 2020-11-30 ENCOUNTER — TELEPHONE (OUTPATIENT)
Dept: GASTROENTEROLOGY | Facility: CLINIC | Age: 55
End: 2020-11-30

## 2020-11-30 NOTE — TELEPHONE ENCOUNTER
----- Message from Maite Rivera MD sent at 11/27/2020 12:58 PM EST -----  Biopsies from the EGD show mild inflammation of the stomach body, normal midesophagus.  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  No dysplasia    She should continue the lansoprazole every day to prevent progression of the Pelayo's esophagus.    Please place in 3-year EGD and colonoscopy recall, thanks    Office follow-up in 6 months, thanks

## 2020-11-30 NOTE — TELEPHONE ENCOUNTER
----- Message from Anabella Baum sent at 11/25/2020 10:33 PM EST -----  Regarding: Test Results Question  Contact: [phone]  I have a question about UPPER GI ENDOSCOPY resulted on 11/23/20, 1:47 PM.  Thank you for taking care of me during my test.   Can you tell me more information about my result?  How much did you have to stretch my esophagus?  Was this normal or abnormal?  What degree was my stenosis?  Do you suspect any long term problems like cancer?  Do I need follow up care?  Thanks   Anabella

## 2020-11-30 NOTE — TELEPHONE ENCOUNTER
----- Message from Anabella Baum sent at 11/25/2020 10:35 PM EST -----  Regarding: Test Results Question  Contact: [phone]  I have a question about UPPER GI ENDOSCOPY resulted on 11/23/20, 1:47 PM.    This result did not load in [employer] ([employer]).

## 2020-11-30 NOTE — TELEPHONE ENCOUNTER
Returned pt's call and advised of Dr Rivera note.  Pt verb understanding.  Pt reports her family MD increased her lansoprazole bid.  She is asking if she should take this once a day or bid.  Advised will send message to Dr Rivera.

## 2020-12-09 ENCOUNTER — HOSPITAL ENCOUNTER (OUTPATIENT)
Dept: CT IMAGING | Facility: HOSPITAL | Age: 55
Discharge: HOME | End: 2020-12-09
Admitting: PHYSICIAN ASSISTANT

## 2020-12-09 DIAGNOSIS — Z72.0 TOBACCO ABUSE: ICD-10-CM

## 2020-12-09 PROCEDURE — G0297 LDCT FOR LUNG CA SCREEN: HCPCS

## 2020-12-10 DIAGNOSIS — I25.84 CORONARY ARTERY CALCIFICATION: ICD-10-CM

## 2020-12-10 DIAGNOSIS — Z72.0 TOBACCO ABUSE: Primary | ICD-10-CM

## 2020-12-10 DIAGNOSIS — I25.10 CORONARY ARTERY CALCIFICATION: ICD-10-CM

## 2020-12-15 ENCOUNTER — TELEPHONE (OUTPATIENT)
Dept: FAMILY MEDICINE CLINIC | Facility: CLINIC | Age: 55
End: 2020-12-15

## 2020-12-15 NOTE — TELEPHONE ENCOUNTER
----- Message from Manuela Baum PA-C sent at 12/10/2020  5:16 PM EST -----  Small nodules and will order repeat test 1 yr. Very concerned about coronary artery calcifications and want you to see a cardiologist

## 2020-12-30 ENCOUNTER — OFFICE VISIT (OUTPATIENT)
Dept: CARDIOLOGY | Facility: CLINIC | Age: 55
End: 2020-12-30

## 2020-12-30 VITALS
HEIGHT: 64 IN | DIASTOLIC BLOOD PRESSURE: 82 MMHG | HEART RATE: 115 BPM | WEIGHT: 156.6 LBS | SYSTOLIC BLOOD PRESSURE: 126 MMHG | BODY MASS INDEX: 26.73 KG/M2

## 2020-12-30 DIAGNOSIS — I25.10 CORONARY ARTERY CALCIFICATION: Primary | ICD-10-CM

## 2020-12-30 DIAGNOSIS — I25.84 CORONARY ARTERY CALCIFICATION: Primary | ICD-10-CM

## 2020-12-30 PROCEDURE — 99244 OFF/OP CNSLTJ NEW/EST MOD 40: CPT | Performed by: INTERNAL MEDICINE

## 2020-12-30 PROCEDURE — 93000 ELECTROCARDIOGRAM COMPLETE: CPT | Performed by: INTERNAL MEDICINE

## 2020-12-30 RX ORDER — CETIRIZINE HYDROCHLORIDE 5 MG/1
5 TABLET ORAL DAILY
COMMUNITY

## 2020-12-30 NOTE — PROGRESS NOTES
Subjective:     Encounter Date:12/30/2020      Patient ID: Anabella Baum is a 55 y.o. female.    Dear Manuela thank you for referring this patient for evaluation of coronary calcification noted on CT scan  Chief Complaint: Coronary artery calcification  History of Present Illness    55-year-old female who presents today for evaluation.  Patient had a CT scan for lung cancer screening and was found to have moderate to severe calcification of her coronary arteries.  Patient also has known calcification of her carotid arteries.  Because of that she is already on statin and aspirin.  Patient denies any types of cardiac symptoms no chest pain shortness of breath edema lightheadedness or fatigue.  She does say her hands and feet get cold which is new for her.    Review of Systems   Respiratory: Positive for cough and snoring.    All other systems reviewed and are negative.        ECG 12 Lead    Date/Time: 12/30/2020 11:30 AM  Performed by: Rashid Wright MD  Authorized by: Rashid Wright MD   Comparison: compared with previous ECG from 8/2/2017  Rhythm: sinus rhythm    Clinical impression: normal ECG               Objective:     Vitals signs reviewed.   Constitutional:       Appearance: Well-developed.   Eyes:      Conjunctiva/sclera: Conjunctivae normal.   HENT:      Head: Normocephalic.   Neck:      Musculoskeletal: Normal range of motion.   Pulmonary:      Breath sounds: Normal breath sounds.   Cardiovascular:      Normal rate. Regular rhythm.   Edema:     Peripheral edema absent.   Abdominal:      General: Bowel sounds are normal.      Palpations: Abdomen is soft.   Musculoskeletal: Normal range of motion.   Skin:     General: Skin is warm and dry.   Neurological:      Mental Status: Alert and oriented to person, place, and time.   Psychiatric:         Behavior: Behavior normal.         Lab Review:       Assessment:          Diagnosis Plan   1. Coronary artery calcification  Treadmill Stress Test           Plan:         1.  Coronary artery calcification.  Patient is already on appropriate therapy.  1 thing I would do is set her up for a stress test.  With a normal ECG I did set her up for a ordinary treadmill.  2.  Smoking abuse I did spend quite a bit of time with her about this.  We talked for probably about 10 minutes explaining different treatment options.  Although as I explained to her everyone knows he should stop smoking doing it is extremely difficult.  We talked about patches nicotine gum and even the e-cigarette.  Explained risks and benefits of all but encourage her to quit.  3.  If her stress test is unremarkable I would see her back on an as-needed basis.

## 2021-01-05 ENCOUNTER — LAB (OUTPATIENT)
Dept: LAB | Facility: HOSPITAL | Age: 56
End: 2021-01-05

## 2021-01-05 ENCOUNTER — TRANSCRIBE ORDERS (OUTPATIENT)
Dept: ADMINISTRATIVE | Facility: HOSPITAL | Age: 56
End: 2021-01-05

## 2021-01-05 DIAGNOSIS — Z01.818 OTHER SPECIFIED PRE-OPERATIVE EXAMINATION: Primary | ICD-10-CM

## 2021-01-05 DIAGNOSIS — Z01.818 OTHER SPECIFIED PRE-OPERATIVE EXAMINATION: ICD-10-CM

## 2021-01-05 PROCEDURE — C9803 HOPD COVID-19 SPEC COLLECT: HCPCS

## 2021-01-05 PROCEDURE — U0004 COV-19 TEST NON-CDC HGH THRU: HCPCS

## 2021-01-06 LAB — SARS-COV-2 RNA RESP QL NAA+PROBE: NOT DETECTED

## 2021-01-07 ENCOUNTER — HOSPITAL ENCOUNTER (OUTPATIENT)
Dept: CARDIOLOGY | Facility: HOSPITAL | Age: 56
Discharge: HOME | End: 2021-01-07
Admitting: INTERNAL MEDICINE

## 2021-01-07 DIAGNOSIS — I25.84 CORONARY ARTERY CALCIFICATION: ICD-10-CM

## 2021-01-07 DIAGNOSIS — I25.10 CORONARY ARTERY CALCIFICATION: ICD-10-CM

## 2021-01-07 LAB
BH CV STRESS BP STAGE 1: NORMAL
BH CV STRESS BP STAGE 2: NORMAL
BH CV STRESS DURATION MIN STAGE 1: 3
BH CV STRESS DURATION MIN STAGE 2: 3
BH CV STRESS DURATION SEC STAGE 1: 0
BH CV STRESS DURATION SEC STAGE 2: 0
BH CV STRESS GRADE STAGE 1: 10
BH CV STRESS GRADE STAGE 2: 12
BH CV STRESS HR STAGE 1: 136
BH CV STRESS HR STAGE 2: 154
BH CV STRESS METS STAGE 1: 5
BH CV STRESS METS STAGE 2: 7.5
BH CV STRESS PROTOCOL 1: NORMAL
BH CV STRESS RECOVERY BP: NORMAL MMHG
BH CV STRESS RECOVERY HR: 113 BPM
BH CV STRESS SPEED STAGE 1: 1.7
BH CV STRESS SPEED STAGE 2: 2.5
BH CV STRESS STAGE 1: 1
BH CV STRESS STAGE 2: 2
MAXIMAL PREDICTED HEART RATE: 163 BPM
PERCENT MAX PREDICTED HR: 94.48 %
STRESS BASELINE BP: NORMAL MMHG
STRESS BASELINE HR: 103 BPM
STRESS PERCENT HR: 111 %
STRESS POST ESTIMATED WORKLOAD: 7 METS
STRESS POST EXERCISE DUR MIN: 6 MIN
STRESS POST EXERCISE DUR SEC: 0 SEC
STRESS POST PEAK BP: NORMAL MMHG
STRESS POST PEAK HR: 154 BPM
STRESS TARGET HR: 139 BPM

## 2021-01-07 PROCEDURE — 93017 CV STRESS TEST TRACING ONLY: CPT

## 2021-01-07 PROCEDURE — 93016 CV STRESS TEST SUPVJ ONLY: CPT | Performed by: INTERNAL MEDICINE

## 2021-01-07 PROCEDURE — 93018 CV STRESS TEST I&R ONLY: CPT | Performed by: INTERNAL MEDICINE

## 2021-03-26 ENCOUNTER — BULK ORDERING (OUTPATIENT)
Dept: CASE MANAGEMENT | Facility: OTHER | Age: 56
End: 2021-03-26

## 2021-03-26 DIAGNOSIS — Z23 IMMUNIZATION DUE: ICD-10-CM

## 2021-04-16 RX ORDER — LISINOPRIL 10 MG/1
TABLET ORAL
Qty: 90 TABLET | Refills: 0 | Status: SHIPPED | OUTPATIENT
Start: 2021-04-16 | End: 2021-09-15 | Stop reason: SDUPTHER

## 2021-07-30 RX ORDER — LISINOPRIL 10 MG/1
TABLET ORAL
Qty: 90 TABLET | Refills: 3 | OUTPATIENT
Start: 2021-07-30

## 2021-09-15 RX ORDER — LISINOPRIL 10 MG/1
10 TABLET ORAL DAILY
Qty: 30 TABLET | Refills: 0 | Status: SHIPPED | OUTPATIENT
Start: 2021-09-15 | End: 2021-09-24 | Stop reason: SDUPTHER

## 2021-09-15 NOTE — TELEPHONE ENCOUNTER
Caller: Anabella Baum    Relationship: Self    Best call back number: [phone]    Medication needed:   Requested Prescriptions     Pending Prescriptions Disp Refills   • lisinopril (PRINIVIL,ZESTRIL) 10 MG tablet 90 tablet 0     Sig: Take 1 tablet by mouth Daily. for blood pressure       When do you need the refill by: ASAP    What additional details did the patient provide when requesting the medication: PATIENT HAS BEEN OUT OF MEDICATION FOR A FEW DAYS. WOULD LIKE ENOUGH MEDICATION CALLED INTO PHARMACY TO MAKE IT UNTIL HER APPOINTMENT WITH ADELA BAUM ON 9/24/21.    Does the patient have less than a 3 day supply:  [x] Yes  [] No    What is the patient's preferred pharmacy:  [id number] [place], [place] - [address] [facility] [address] [facility] - [phone] [facility] [phone]   [phone]

## 2021-09-24 ENCOUNTER — OFFICE VISIT (OUTPATIENT)
Dept: FAMILY MEDICINE CLINIC | Facility: CLINIC | Age: 56
End: 2021-09-24

## 2021-09-24 VITALS
BODY MASS INDEX: 27.31 KG/M2 | RESPIRATION RATE: 16 BRPM | WEIGHT: 160 LBS | SYSTOLIC BLOOD PRESSURE: 110 MMHG | OXYGEN SATURATION: 98 % | HEIGHT: 64 IN | DIASTOLIC BLOOD PRESSURE: 72 MMHG | HEART RATE: 88 BPM | TEMPERATURE: 97.5 F

## 2021-09-24 DIAGNOSIS — R09.89 BRUIT OF LEFT CAROTID ARTERY: ICD-10-CM

## 2021-09-24 DIAGNOSIS — E78.2 MIXED HYPERLIPIDEMIA: ICD-10-CM

## 2021-09-24 DIAGNOSIS — I10 BENIGN ESSENTIAL HTN: Primary | ICD-10-CM

## 2021-09-24 DIAGNOSIS — E55.9 VITAMIN D DEFICIENCY: ICD-10-CM

## 2021-09-24 DIAGNOSIS — Z72.0 TOBACCO ABUSE: ICD-10-CM

## 2021-09-24 DIAGNOSIS — K21.9 GASTROESOPHAGEAL REFLUX DISEASE WITHOUT ESOPHAGITIS: ICD-10-CM

## 2021-09-24 DIAGNOSIS — Z12.31 ENCOUNTER FOR SCREENING MAMMOGRAM FOR BREAST CANCER: ICD-10-CM

## 2021-09-24 PROBLEM — D18.00 BENIGN HEMANGIOMA: Status: ACTIVE | Noted: 2021-09-24

## 2021-09-24 PROCEDURE — 99214 OFFICE O/P EST MOD 30 MIN: CPT | Performed by: PHYSICIAN ASSISTANT

## 2021-09-24 RX ORDER — ATORVASTATIN CALCIUM 40 MG/1
40 TABLET, FILM COATED ORAL DAILY
Qty: 90 TABLET | Refills: 3 | Status: SHIPPED | OUTPATIENT
Start: 2021-09-24 | End: 2022-01-13

## 2021-09-24 RX ORDER — LISINOPRIL 10 MG/1
10 TABLET ORAL DAILY
Qty: 90 TABLET | Refills: 1 | Status: SHIPPED | OUTPATIENT
Start: 2021-09-24 | End: 2022-03-07

## 2021-09-24 RX ORDER — LANSOPRAZOLE 30 MG/1
30 CAPSULE, DELAYED RELEASE ORAL DAILY
Qty: 90 CAPSULE | Refills: 3 | Status: SHIPPED | OUTPATIENT
Start: 2021-09-24 | End: 2022-09-30

## 2021-09-24 NOTE — PROGRESS NOTES
"Subjective   Anabella Baum is a 56 y.o. female.     History of Present Illness    Since the last visit, she has overall felt well.  She has Essential Hypertension and well controlled on current medication, GERD controlled on PPI Rx, Hyperlipidemia with goals met with current Rx and Vitamin D deficiency and will update labs for continued management.  she has been compliant with current medications have reviewed them.  The patient denies medication side effects.  Will refill medications. /72   Pulse 88   Temp 97.5 °F (36.4 °C)   Resp 16   Ht 162.6 cm (64.02\")   Wt 72.6 kg (160 lb)   LMP  (LMP Unknown)   SpO2 98%   BMI 27.45 kg/m²     Results for orders placed or performed during the hospital encounter of 01/07/21   Treadmill Stress Test   Result Value Ref Range    BH CV STRESS PROTOCOL 1 Shawn     Stage 1 1     HR Stage 1 136     BP Stage 1 160/80     Duration Min Stage 1 3     Duration Sec Stage 1 0     Grade Stage 1 10     Speed Stage 1 1.7     BH CV STRESS METS STAGE 1 5     Stage 2 2     HR Stage 2 154     BP Stage 2 170/82     Duration Min Stage 2 3     Duration Sec Stage 2 0     Grade Stage 2 12     Speed Stage 2 2.5     BH CV STRESS METS STAGE 2 7.5     Baseline  bpm    Baseline /80 mmHg    Peak  bpm    Percent Max Pred HR 94.48 %    Percent Target  %    Peak /82 mmHg    Recovery  bpm    Recovery /70 mmHg    Target HR (85%) 139 bpm    Max. Pred. HR (100%) 163 bpm    Exercise duration (min) 6 min    Exercise duration (sec) 0 sec    Estimated workload 7.0 METS           I did have patient see cardiology and saw Dr. Wright 12/30/2020.  I asked her to see cardiology after noting coronary artery calcification on her low-dose lung CT scan due to her smoking history.  EKG was normal and he did do a stress test and this was negative normal and asked her to follow-up only as needed.    She did see GI---Dr Rivera did EGD---dilated her and no more " dysphagia  Biopsies from the EGD show mild inflammation of the stomach body, normal midesophagus.  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  No dysplasia     She should continue the lansoprazole every day to prevent progression of the Pelayo's esophagus.     Please place in 3-year EGD and colonoscopy recall, thanks     Office follow-up in 6 months, thanks   Has family Hashimoto's      Need f/u low dose CT yearly screen lung cancer    Raised dose Lipitor last visit---want LDL <70    Need f/u carotid doppler Dec--left mild stenosis  The following portions of the patient's history were reviewed and updated as appropriate: allergies, current medications, past family history, past medical history, past social history, past surgical history and problem list.    Review of Systems   Constitutional: Negative for activity change, appetite change and unexpected weight change.   HENT: Negative for nosebleeds and trouble swallowing.    Eyes: Negative for pain and visual disturbance.   Respiratory: Negative for chest tightness, shortness of breath and wheezing.    Cardiovascular: Negative for chest pain and palpitations.   Gastrointestinal: Negative for abdominal pain and blood in stool.   Endocrine: Negative.    Genitourinary: Negative for difficulty urinating and hematuria.   Musculoskeletal: Negative for joint swelling.   Skin: Negative for color change and rash.   Allergic/Immunologic: Negative.    Neurological: Negative for syncope and speech difficulty.   Hematological: Negative for adenopathy.   Psychiatric/Behavioral: Negative for agitation and confusion.   All other systems reviewed and are negative.      Objective   Physical Exam  Vitals and nursing note reviewed.   Constitutional:       General: She is not in acute distress.     Appearance: She is well-developed. She is not ill-appearing or toxic-appearing.   HENT:      Head: Normocephalic.      Right Ear:  External ear normal.      Left Ear: External ear normal.      Nose: Nose normal.      Mouth/Throat:      Pharynx: Oropharynx is clear.   Eyes:      General: No scleral icterus.     Conjunctiva/sclera: Conjunctivae normal.      Pupils: Pupils are equal, round, and reactive to light.   Neck:      Thyroid: No thyromegaly.      Vascular: Carotid bruit present.   Cardiovascular:      Rate and Rhythm: Normal rate and regular rhythm.      Heart sounds: Normal heart sounds. No murmur heard.     Pulmonary:      Effort: Pulmonary effort is normal. No respiratory distress.      Breath sounds: Normal breath sounds.   Musculoskeletal:         General: No deformity. Normal range of motion.      Cervical back: Normal range of motion and neck supple. Tenderness: left.   Skin:     General: Skin is warm and dry.      Findings: No rash.   Neurological:      General: No focal deficit present.      Mental Status: She is alert and oriented to person, place, and time. Mental status is at baseline.   Psychiatric:         Mood and Affect: Mood normal.         Behavior: Behavior normal.         Thought Content: Thought content normal.         Judgment: Judgment normal.         Assessment/Plan   Diagnoses and all orders for this visit:    1. Benign essential HTN (Primary)    2. Mixed hyperlipidemia    3. Gastroesophageal reflux disease without esophagitis    4. Tobacco abuse    5. Bruit of left carotid artery    6. Vitamin D deficiency    7. Encounter for screening mammogram for breast cancer  -     Mammo Screening Digital Tomosynthesis Bilateral With CAD; Future        Stop smoking  Exercise  See GI for f/u  Plan, nAabella Baum, was seen today.  she was seen for HTN and continue medication, GERD and will continue on PPI medication, Hyperlipidemia and will continue current medication and Vitamin D deficiency and will update labs .  Get low dose CT chest  Has f/u carotid doppler

## 2021-10-28 RX ORDER — MAGNESIUM 200 MG
TABLET ORAL
Qty: 90 TABLET | Refills: 3 | Status: SHIPPED | OUTPATIENT
Start: 2021-10-28 | End: 2022-10-24

## 2021-11-09 ENCOUNTER — OFFICE VISIT (OUTPATIENT)
Dept: FAMILY MEDICINE CLINIC | Facility: CLINIC | Age: 56
End: 2021-11-09

## 2021-11-09 VITALS
OXYGEN SATURATION: 96 % | TEMPERATURE: 97.8 F | SYSTOLIC BLOOD PRESSURE: 131 MMHG | HEIGHT: 64 IN | HEART RATE: 114 BPM | RESPIRATION RATE: 16 BRPM | DIASTOLIC BLOOD PRESSURE: 79 MMHG | BODY MASS INDEX: 27.31 KG/M2 | WEIGHT: 160 LBS

## 2021-11-09 DIAGNOSIS — R05.8 COUGH WITH CONGESTION OF PARANASAL SINUS: ICD-10-CM

## 2021-11-09 DIAGNOSIS — R09.81 COUGH WITH CONGESTION OF PARANASAL SINUS: ICD-10-CM

## 2021-11-09 DIAGNOSIS — R68.89 FLU-LIKE SYMPTOMS: Primary | ICD-10-CM

## 2021-11-09 DIAGNOSIS — J01.00 ACUTE NON-RECURRENT MAXILLARY SINUSITIS: ICD-10-CM

## 2021-11-09 LAB
EXPIRATION DATE: NORMAL
FLUAV AG NPH QL: NEGATIVE
FLUBV AG NPH QL: NEGATIVE
INTERNAL CONTROL: NORMAL
Lab: NORMAL

## 2021-11-09 PROCEDURE — 99214 OFFICE O/P EST MOD 30 MIN: CPT | Performed by: NURSE PRACTITIONER

## 2021-11-09 PROCEDURE — 87804 INFLUENZA ASSAY W/OPTIC: CPT | Performed by: NURSE PRACTITIONER

## 2021-11-09 RX ORDER — DEXTROMETHORPHAN HYDROBROMIDE AND PROMETHAZINE HYDROCHLORIDE 15; 6.25 MG/5ML; MG/5ML
5 SYRUP ORAL 4 TIMES DAILY PRN
Qty: 118 ML | Refills: 0 | Status: SHIPPED | OUTPATIENT
Start: 2021-11-09 | End: 2022-04-08 | Stop reason: SDDI

## 2021-11-09 RX ORDER — DOXYCYCLINE HYCLATE 100 MG/1
100 CAPSULE ORAL 2 TIMES DAILY
Qty: 20 CAPSULE | Refills: 0 | Status: SHIPPED | OUTPATIENT
Start: 2021-11-09 | End: 2022-04-08 | Stop reason: SDDI

## 2021-11-09 NOTE — PROGRESS NOTES
Chief Complaint  Sinus Problem    Subjective          Anabella presents to [facility]    Review of Systems   Constitutional: Positive for fatigue and fever. Negative for chills.   HENT: Positive for congestion and sinus pressure.    Eyes: Negative.    Respiratory: Positive for cough. Negative for choking, chest tightness, shortness of breath, wheezing and stridor.    Cardiovascular: Negative.    Gastrointestinal: Negative.  Negative for abdominal pain, diarrhea, nausea and vomiting.   Genitourinary: Negative.    Neurological: Negative for dizziness.   Psychiatric/Behavioral: Negative.      56-year-old female presents to the office today complaining of headache, congestion, runny nose, sneezing, body aches, low-grade fever 99.8 for the past 2 days.  She states she has not been around anybody else that has been sick.  He does have a dry hacking cough that is keeping her from sleeping at night.    Blood pressure in office today was 131/79    She has an active problem list of the following    Patient Active Problem List   Diagnosis   • Allergic reaction   • Carotid bruit   • GERD (gastroesophageal reflux disease)   • HLD (hyperlipidemia)   • CTS (carpal tunnel syndrome)   • Rash, skin   • Vitamin D deficiency   • Dyshidrotic eczema   • Tobacco abuse   • Family history of breast cancer gene mutation in first degree relative   • Benign essential HTN   • Blood pressure elevated without history of HTN   • Stenosis of left carotid artery   • Esophageal dysphagia   • Gastroesophageal reflux disease   • Benign hemangioma       Has been compliant with following medications  Current Outpatient Medications on File Prior to Visit   Medication Sig Dispense Refill   • aspirin 81 MG tablet Take 81 mg by mouth daily.     • atorvastatin (Lipitor) 40 MG tablet Take 1 tablet by mouth Daily. For cholesterol 90 tablet 3   • cetirizine (zyrTEC) 5 MG tablet Take 5 mg by mouth Daily.     • Cholecalciferol (VITAMIN  "D) 2000 units tablet 2 PO daily 60 tablet 11   • Cyanocobalamin (Vitamin B-12) 1000 MCG sublingual tablet DISSOLVE 1 TABLET UNDER THE TONGUE DAILY 90 tablet 3   • lansoprazole (PREVACID) 30 MG capsule Take 1 capsule by mouth Daily. for GERD 90 capsule 3   • lisinopril (PRINIVIL,ZESTRIL) 10 MG tablet Take 1 tablet by mouth Daily. for blood pressure 90 tablet 1   • Multiple Vitamins-Minerals (MULTIVITAMIN WITH MINERALS) tablet Take 1 tablet by mouth daily.       No current facility-administered medications on file prior to visit.         The following portions of the patient's history were reviewed and updated as appropriate: allergies, current medications, past family history, past medical history, past social history, past surgical history, and problem list             Objective   Vital Signs:   Vitals:    11/09/21 1414   BP: 131/79   Pulse: 114   Resp: 16   Temp: 97.8 °F (36.6 °C)   TempSrc: Skin   SpO2: 96%   Weight: 72.6 kg (160 lb)   Height: 162.6 cm (64.02\")        Physical Exam  Constitutional:       General: She is not in acute distress.     Appearance: Normal appearance. She is normal weight. She is not ill-appearing.   HENT:      Head: Normocephalic.      Nose: No congestion.      Right Sinus: Maxillary sinus tenderness present.      Left Sinus: Maxillary sinus tenderness present.   Eyes:      Pupils: Pupils are equal, round, and reactive to light.   Cardiovascular:      Rate and Rhythm: Normal rate and regular rhythm.      Pulses: Normal pulses.      Heart sounds: Normal heart sounds. No murmur heard.      Pulmonary:      Effort: Pulmonary effort is normal.      Breath sounds: Normal breath sounds.   Abdominal:      General: Abdomen is flat.      Palpations: Abdomen is soft.   Musculoskeletal:      Cervical back: Normal range of motion and neck supple.   Skin:     General: Skin is warm and dry.      Capillary Refill: Capillary refill takes less than 2 seconds.      Findings: No rash.   Neurological:      " Mental Status: She is alert and oriented to person, place, and time.   Psychiatric:         Mood and Affect: Mood normal.         Behavior: Behavior normal.         Thought Content: Thought content normal.         Judgment: Judgment normal.          Result Review :     {The following data was reviewed by Dina MULLINS      COVID-19,[facility] ROUTINE, NP/OP SWAB IN TRANSPORT MEDIA OR ESWAB 72 HR TAT - Swab, Anterior nasal (11/09/2021 15:06)  POC Influenza A / B (11/09/2021 14:32)       Assessment and Plan    Diagnoses and all orders for this visit:    1. Flu-like symptoms (Primary)  -     COVID-19,[facility] ROUTINE, NP/OP SWAB IN TRANSPORT MEDIA OR ESWAB 72 HR TAT - Swab, Anterior nasal  -     POC Influenza A / B    2. Acute non-recurrent maxillary sinusitis  -     doxycycline (VIBRAMYCIN) 100 MG capsule; Take 1 capsule by mouth 2 (Two) Times a Day. For infection  Dispense: 20 capsule; Refill: 0    3. Cough with congestion of paranasal sinus  -     promethazine-dextromethorphan (PROMETHAZINE-DM) 6.25-15 MG/5ML syrup; Take 5 mL by mouth 4 (Four) Times a Day As Needed for Cough.  Dispense: 118 mL; Refill: 0      Plan  Take medication as prescribed monitor for any side effects  Finish all antibiotics  Do not drive while taking cough medicine  Quarantine until negative COVID-19 results  Influenza negative today      Follow Up   Return if symptoms worsen or fail to improve.  Patient was given instructions and counseling regarding her condition or for health maintenance advice. Please see specific information pulled into the AVS if appropriate.

## 2021-11-09 NOTE — PATIENT INSTRUCTIONS
Sinusitis, Adult  Sinusitis is inflammation of your sinuses. Sinuses are hollow spaces in the bones around your face. Your sinuses are located:  · Around your eyes.  · In the middle of your forehead.  · Behind your nose.  · In your cheekbones.  Mucus normally drains out of your sinuses. When your nasal tissues become inflamed or swollen, mucus can become trapped or blocked. This allows bacteria, viruses, and fungi to grow, which leads to infection. Most infections of the sinuses are caused by a virus.  Sinusitis can develop quickly. It can last for up to 4 weeks (acute) or for more than 12 weeks (chronic). Sinusitis often develops after a cold.  What are the causes?  This condition is caused by anything that creates swelling in the sinuses or stops mucus from draining. This includes:  · Allergies.  · Asthma.  · Infection from bacteria or viruses.  · Deformities or blockages in your nose or sinuses.  · Abnormal growths in the nose (nasal polyps).  · Pollutants, such as chemicals or irritants in the air.  · Infection from fungi (rare).  What increases the risk?  You are more likely to develop this condition if you:  · Have a weak body defense system (immune system).  · Do a lot of swimming or diving.  · Overuse nasal sprays.  · Smoke.  What are the signs or symptoms?  The main symptoms of this condition are pain and a feeling of pressure around the affected sinuses. Other symptoms include:  · Stuffy nose or congestion.  · Thick drainage from your nose.  · Swelling and warmth over the affected sinuses.  · Headache.  · Upper toothache.  · A cough that may get worse at night.  · Extra mucus that collects in the throat or the back of the nose (postnasal drip).  · Decreased sense of smell and taste.  · Fatigue.  · A fever.  · Sore throat.  · Bad breath.  How is this diagnosed?  This condition is diagnosed based on:  · Your symptoms.  · Your medical history.  · A physical exam.  · Tests to find out if your condition is  acute or chronic. This may include:  ? Checking your nose for nasal polyps.  ? Viewing your sinuses using a device that has a light (endoscope).  ? Testing for allergies or bacteria.  ? Imaging tests, such as an MRI or CT scan.  In rare cases, a bone biopsy may be done to rule out more serious types of fungal sinus disease.  How is this treated?  Treatment for sinusitis depends on the cause and whether your condition is chronic or acute.  · If caused by a virus, your symptoms should go away on their own within 10 days. You may be given medicines to relieve symptoms. They include:  ? Medicines that shrink swollen nasal passages (topical intranasal decongestants).  ? Medicines that treat allergies (antihistamines).  ? A spray that eases inflammation of the nostrils (topical intranasal corticosteroids).  ? Rinses that help get rid of thick mucus in your nose (nasal saline washes).  · If caused by bacteria, your health care provider may recommend waiting to see if your symptoms improve. Most bacterial infections will get better without antibiotic medicine. You may be given antibiotics if you have:  ? A severe infection.  ? A weak immune system.  · If caused by narrow nasal passages or nasal polyps, you may need to have surgery.  Follow these instructions at home:  Medicines  · Take, use, or apply over-the-counter and prescription medicines only as told by your health care provider. These may include nasal sprays.  · If you were prescribed an antibiotic medicine, take it as told by your health care provider. Do not stop taking the antibiotic even if you start to feel better.  Hydrate and humidify    · Drink enough fluid to keep your urine pale yellow. Staying hydrated will help to thin your mucus.  · Use a cool mist humidifier to keep the humidity level in your home above 50%.  · Inhale steam for 10-15 minutes, 3-4 times a day, or as told by your health care provider. You can do this in the bathroom while a hot shower is  running.  · Limit your exposure to cool or dry air.    Rest  · Rest as much as possible.  · Sleep with your head raised (elevated).  · Make sure you get enough sleep each night.  General instructions    · Apply a warm, moist washcloth to your face 3-4 times a day or as told by your health care provider. This will help with discomfort.  · Wash your hands often with soap and water to reduce your exposure to germs. If soap and water are not available, use hand .  · Do not smoke. Avoid being around people who are smoking (secondhand smoke).  · Keep all follow-up visits as told by your health care provider. This is important.    Contact a health care provider if:  · You have a fever.  · Your symptoms get worse.  · Your symptoms do not improve within 10 days.  Get help right away if:  · You have a severe headache.  · You have persistent vomiting.  · You have severe pain or swelling around your face or eyes.  · You have vision problems.  · You develop confusion.  · Your neck is stiff.  · You have trouble breathing.  Summary  · Sinusitis is soreness and inflammation of your sinuses. Sinuses are hollow spaces in the bones around your face.  · This condition is caused by nasal tissues that become inflamed or swollen. The swelling traps or blocks the flow of mucus. This allows bacteria, viruses, and fungi to grow, which leads to infection.  · If you were prescribed an antibiotic medicine, take it as told by your health care provider. Do not stop taking the antibiotic even if you start to feel better.  · Keep all follow-up visits as told by your health care provider. This is important.  This information is not intended to replace advice given to you by your health care provider. Make sure you discuss any questions you have with your health care provider.  Document Revised: 05/20/2019 Document Reviewed: 05/20/2019  [employer] Patient Education © 2021 [employer]

## 2021-11-10 LAB
LABCORP SARS-COV-2, NAA 2 DAY TAT: NORMAL
SARS-COV-2 RNA RESP QL NAA+PROBE: NOT DETECTED

## 2021-12-10 ENCOUNTER — HOSPITAL ENCOUNTER (OUTPATIENT)
Dept: CT IMAGING | Facility: HOSPITAL | Age: 56
Discharge: HOME | End: 2021-12-10
Admitting: PHYSICIAN ASSISTANT

## 2021-12-10 DIAGNOSIS — Z72.0 TOBACCO ABUSE: ICD-10-CM

## 2021-12-10 DIAGNOSIS — Z72.0 TOBACCO ABUSE: Primary | ICD-10-CM

## 2021-12-10 PROCEDURE — 71271 CT THORAX LUNG CANCER SCR C-: CPT

## 2022-01-12 ENCOUNTER — HOSPITAL ENCOUNTER (OUTPATIENT)
Dept: MAMMOGRAPHY | Facility: HOSPITAL | Age: 57
Discharge: HOME | End: 2022-01-12
Admitting: PHYSICIAN ASSISTANT

## 2022-01-12 DIAGNOSIS — Z12.31 ENCOUNTER FOR SCREENING MAMMOGRAM FOR BREAST CANCER: ICD-10-CM

## 2022-01-12 PROCEDURE — 77063 BREAST TOMOSYNTHESIS BI: CPT

## 2022-01-12 PROCEDURE — 77067 SCR MAMMO BI INCL CAD: CPT

## 2022-01-13 LAB
25(OH)D3+25(OH)D2 SERPL-MCNC: 41.1 NG/ML (ref 30–100)
ALBUMIN SERPL-MCNC: 4.8 G/DL (ref 3.8–4.9)
ALBUMIN/GLOB SERPL: 2.3 {RATIO} (ref 1.2–2.2)
ALP SERPL-CCNC: 135 IU/L (ref 44–121)
ALT SERPL-CCNC: 17 IU/L (ref 0–32)
APPEARANCE UR: CLEAR
AST SERPL-CCNC: 18 IU/L (ref 0–40)
BACTERIA #/AREA URNS HPF: NORMAL /[HPF]
BASOPHILS # BLD AUTO: 0 X10E3/UL (ref 0–0.2)
BASOPHILS NFR BLD AUTO: 0 %
BILIRUB SERPL-MCNC: 0.5 MG/DL (ref 0–1.2)
BILIRUB UR QL STRIP: NEGATIVE
BUN SERPL-MCNC: 15 MG/DL (ref 6–24)
BUN/CREAT SERPL: 22 (ref 9–23)
CALCIUM SERPL-MCNC: 10.1 MG/DL (ref 8.7–10.2)
CASTS URNS QL MICRO: NORMAL /LPF
CHLORIDE SERPL-SCNC: 101 MMOL/L (ref 96–106)
CHOLEST SERPL-MCNC: 142 MG/DL (ref 100–199)
CO2 SERPL-SCNC: 23 MMOL/L (ref 20–29)
COLOR UR: YELLOW
CREAT SERPL-MCNC: 0.68 MG/DL (ref 0.57–1)
EOSINOPHIL # BLD AUTO: 0.2 X10E3/UL (ref 0–0.4)
EOSINOPHIL NFR BLD AUTO: 2 %
EPI CELLS #/AREA URNS HPF: NORMAL /HPF (ref 0–10)
ERYTHROCYTE [DISTWIDTH] IN BLOOD BY AUTOMATED COUNT: 12 % (ref 11.7–15.4)
FOLATE SERPL-MCNC: 19.4 NG/ML
GLOBULIN SER CALC-MCNC: 2.1 G/DL (ref 1.5–4.5)
GLUCOSE SERPL-MCNC: 95 MG/DL (ref 65–99)
GLUCOSE UR QL: NEGATIVE
HCT VFR BLD AUTO: 44.2 % (ref 34–46.6)
HDLC SERPL-MCNC: 47 MG/DL
HGB BLD-MCNC: 14.7 G/DL (ref 11.1–15.9)
HGB UR QL STRIP: NEGATIVE
IMM GRANULOCYTES # BLD AUTO: 0 X10E3/UL (ref 0–0.1)
IMM GRANULOCYTES NFR BLD AUTO: 0 %
KETONES UR QL STRIP: NEGATIVE
LDLC SERPL CALC-MCNC: 77 MG/DL (ref 0–99)
LEUKOCYTE ESTERASE UR QL STRIP: NEGATIVE
LYMPHOCYTES # BLD AUTO: 2.2 X10E3/UL (ref 0.7–3.1)
LYMPHOCYTES NFR BLD AUTO: 24 %
MCH RBC QN AUTO: 30.4 PG (ref 26.6–33)
MCHC RBC AUTO-ENTMCNC: 33.3 G/DL (ref 31.5–35.7)
MCV RBC AUTO: 91 FL (ref 79–97)
MICRO URNS: NORMAL
MICRO URNS: NORMAL
MONOCYTES # BLD AUTO: 0.7 X10E3/UL (ref 0.1–0.9)
MONOCYTES NFR BLD AUTO: 8 %
NEUTROPHILS # BLD AUTO: 6.1 X10E3/UL (ref 1.4–7)
NEUTROPHILS NFR BLD AUTO: 66 %
NITRITE UR QL STRIP: NEGATIVE
PH UR STRIP: 6 [PH] (ref 5–7.5)
PLATELET # BLD AUTO: 267 X10E3/UL (ref 150–450)
POTASSIUM SERPL-SCNC: 4.3 MMOL/L (ref 3.5–5.2)
PROT SERPL-MCNC: 6.9 G/DL (ref 6–8.5)
PROT UR QL STRIP: NEGATIVE
RBC # BLD AUTO: 4.84 X10E6/UL (ref 3.77–5.28)
RBC #/AREA URNS HPF: NORMAL /HPF (ref 0–2)
SODIUM SERPL-SCNC: 142 MMOL/L (ref 134–144)
SP GR UR: 1.01 (ref 1–1.03)
T3FREE SERPL-MCNC: 3.7 PG/ML (ref 2–4.4)
T4 FREE SERPL-MCNC: 1.3 NG/DL (ref 0.82–1.77)
TRIGL SERPL-MCNC: 96 MG/DL (ref 0–149)
TSH SERPL DL<=0.005 MIU/L-ACNC: 1.38 UIU/ML (ref 0.45–4.5)
UROBILINOGEN UR STRIP-MCNC: 0.2 MG/DL (ref 0.2–1)
VIT B12 SERPL-MCNC: 949 PG/ML (ref 232–1245)
VLDLC SERPL CALC-MCNC: 18 MG/DL (ref 5–40)
WBC # BLD AUTO: 9.2 X10E3/UL (ref 3.4–10.8)
WBC #/AREA URNS HPF: NORMAL /HPF (ref 0–5)

## 2022-01-13 RX ORDER — ATORVASTATIN CALCIUM 80 MG/1
80 TABLET, FILM COATED ORAL DAILY
Qty: 90 TABLET | Refills: 3 | Status: SHIPPED | OUTPATIENT
Start: 2022-01-13 | End: 2022-12-21

## 2022-03-07 RX ORDER — LISINOPRIL 10 MG/1
TABLET ORAL
Qty: 90 TABLET | Refills: 0 | Status: SHIPPED | OUTPATIENT
Start: 2022-03-07 | End: 2022-04-08 | Stop reason: SDUPTHER

## 2022-04-08 ENCOUNTER — OFFICE VISIT (OUTPATIENT)
Dept: FAMILY MEDICINE CLINIC | Facility: CLINIC | Age: 57
End: 2022-04-08

## 2022-04-08 VITALS
RESPIRATION RATE: 16 BRPM | HEART RATE: 100 BPM | DIASTOLIC BLOOD PRESSURE: 80 MMHG | HEIGHT: 64 IN | BODY MASS INDEX: 27.49 KG/M2 | SYSTOLIC BLOOD PRESSURE: 132 MMHG | TEMPERATURE: 97.5 F | WEIGHT: 161 LBS | OXYGEN SATURATION: 97 %

## 2022-04-08 DIAGNOSIS — R09.89 BRUIT OF LEFT CAROTID ARTERY: ICD-10-CM

## 2022-04-08 DIAGNOSIS — E55.9 VITAMIN D DEFICIENCY: ICD-10-CM

## 2022-04-08 DIAGNOSIS — M25.562 ACUTE PAIN OF LEFT KNEE: ICD-10-CM

## 2022-04-08 DIAGNOSIS — K21.9 GASTROESOPHAGEAL REFLUX DISEASE WITHOUT ESOPHAGITIS: ICD-10-CM

## 2022-04-08 DIAGNOSIS — E78.2 MIXED HYPERLIPIDEMIA: ICD-10-CM

## 2022-04-08 DIAGNOSIS — E53.8 LOW SERUM VITAMIN B12: ICD-10-CM

## 2022-04-08 DIAGNOSIS — I25.84 CORONARY ARTERY CALCIFICATION: ICD-10-CM

## 2022-04-08 DIAGNOSIS — I25.10 CORONARY ARTERY CALCIFICATION: ICD-10-CM

## 2022-04-08 DIAGNOSIS — Z72.0 TOBACCO ABUSE: ICD-10-CM

## 2022-04-08 DIAGNOSIS — I10 ESSENTIAL HYPERTENSION: Primary | ICD-10-CM

## 2022-04-08 PROBLEM — E66.3 OVERWEIGHT WITH BODY MASS INDEX (BMI) 25.0-29.9: Status: ACTIVE | Noted: 2022-04-08

## 2022-04-08 PROCEDURE — 99214 OFFICE O/P EST MOD 30 MIN: CPT | Performed by: PHYSICIAN ASSISTANT

## 2022-04-08 RX ORDER — LISINOPRIL 10 MG/1
10 TABLET ORAL DAILY
Qty: 90 TABLET | Refills: 1 | Status: SHIPPED | OUTPATIENT
Start: 2022-04-08

## 2022-04-08 NOTE — PROGRESS NOTES
Subjective   Anabella Baum is a 57 y.o. female.     History of Present Illness    Since the last visit, she has overall felt well.  She has Primary Hypertension and well controlled on current medication, GERD controlled on PPI Rx, Vitamin D deficiency and labs are at goal >30 ng/mL and mixed hyperlipidemia---need LDL <70; raised dose statin in Jan and f/u labs June.  she has been compliant with current medications have reviewed them.  The patient denies medication side effects.  Will refill medications. LMP  (LMP Unknown)   Pain left knee to kneal for mos  Refer ortho  Lab Results   Component Value Date    CHLPL 142 01/12/2022    TRIG 96 01/12/2022    HDL 47 01/12/2022    LDL 77 01/12/2022     Lab Results   Component Value Date    GLUCOSE 95 01/12/2022    BUN 15 01/12/2022    CREATININE 0.68 01/12/2022    EGFRIFNONA 97 01/12/2022    EGFRIFAFRI 112 01/12/2022    BCR 22 01/12/2022    K 4.3 01/12/2022    CO2 23 01/12/2022    CALCIUM 10.1 01/12/2022    PROTENTOTREF 6.9 01/12/2022    ALBUMIN 4.8 01/12/2022    LABIL2 2.3 (H) 01/12/2022    AST 18 01/12/2022    ALT 17 01/12/2022     Lab Results   Component Value Date    TSH 1.380 01/12/2022   need exercise  Noted on labs from 1/12/2022 alkaline phosphatase was slightly elevated we will continue to monitor with labs.  Raise dose of atorvastatin to 80 mg due to LDL cholesterol not less than 70.  Do need follow-up labs.    I did have patient see cardiology and saw Dr. Wright 12/30/2020.  I asked her to see cardiology after noting coronary artery calcification on her low-dose lung CT scan due to her smoking history.  EKG was normal and he did do a stress test and this was negative normal and asked her to follow-up only as needed.     She did see GI---Dr Rivera did EGD---dilated her and no more dysphagia  Biopsies from the EGD show mild inflammation of the stomach body, normal midesophagus.  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  No dysplasia     She should continue the lansoprazole every day to prevent progression of the Pelayo's esophagus.     Please place in 3-year EGD and colonoscopy recall, thanks     Office follow-up in 6 months, thanks   Has family Hashimoto's        Need f/u low dose CT yearly screen lung cancer     Raised dose Lipitor last visit---want LDL <70     Need f/u carotid doppler Dec--left mild stenosis  The following portions of the patient's history were reviewed and updated as appropriate: allergies, current medications, past family history, past medical history, past social history, past surgical history and problem list.    Review of Systems   Constitutional: Negative for activity change, appetite change and unexpected weight change.   HENT: Negative for nosebleeds and trouble swallowing.    Eyes: Negative for pain and visual disturbance.   Respiratory: Negative for chest tightness, shortness of breath and wheezing.    Cardiovascular: Negative for chest pain and palpitations.   Gastrointestinal: Negative for abdominal pain and blood in stool.   Endocrine: Negative.    Genitourinary: Negative for difficulty urinating and hematuria.   Musculoskeletal: Negative for joint swelling.   Skin: Negative for color change and rash.   Allergic/Immunologic: Negative.    Neurological: Negative for syncope and speech difficulty.   Hematological: Negative for adenopathy.   Psychiatric/Behavioral: Negative for agitation and confusion. The patient is not nervous/anxious.    All other systems reviewed and are negative.      Objective   Physical Exam  Vitals and nursing note reviewed.   Constitutional:       General: She is not in acute distress.     Appearance: She is well-developed. She is not ill-appearing or toxic-appearing.   HENT:      Head: Normocephalic.      Right Ear: External ear normal.      Left Ear: External ear normal.      Nose: Nose normal.      Mouth/Throat:      Pharynx: Oropharynx is clear.    Eyes:      General: No scleral icterus.     Conjunctiva/sclera: Conjunctivae normal.      Pupils: Pupils are equal, round, and reactive to light.   Neck:      Thyroid: No thyromegaly.      Vascular: Carotid bruit present.   Cardiovascular:      Rate and Rhythm: Normal rate and regular rhythm.      Pulses: Normal pulses.      Heart sounds: Normal heart sounds. No murmur heard.  Pulmonary:      Effort: Pulmonary effort is normal. No respiratory distress.      Breath sounds: Normal breath sounds. No rales.   Musculoskeletal:         General: Tenderness present. No deformity. Normal range of motion.      Cervical back: Normal range of motion and neck supple.      Right lower leg: No edema.      Left lower leg: No edema.      Comments: Left knee   Skin:     General: Skin is warm and dry.      Findings: No rash.   Neurological:      General: No focal deficit present.      Mental Status: She is alert and oriented to person, place, and time. Mental status is at baseline.   Psychiatric:         Mood and Affect: Mood normal.         Behavior: Behavior normal.         Thought Content: Thought content normal.         Judgment: Judgment normal.         Assessment/Plan   Diagnoses and all orders for this visit:    1. Essential hypertension (Primary)  -     Comprehensive metabolic panel; Future  -     Lipid panel; Future    2. Mixed hyperlipidemia  -     Comprehensive metabolic panel; Future  -     Lipid panel; Future    3. Gastroesophageal reflux disease without esophagitis    4. Tobacco abuse    5. Bruit of left carotid artery    6. Vitamin D deficiency    7. Low serum vitamin B12    8. Coronary artery calcification    9. Acute pain of left knee  -     Ambulatory Referral to Orthopedic Surgery    advise pneumovax    Advised patient to follow-up with GI if dysphagia develops again.  Continue PPI  Work on smoking cessation diet and exercise due to known history of coronary artery calcification.  Carotid atherosclerosis left need  to be followed with yearly with Doppler.  Goal of LDL less than 70   Plan, Anabella Baum, was seen today.  she was seen for HTN and continue medication, GERD and will continue on PPI medication, Hyperlipidemia and will continue current medication and Vitamin D deficiency and supplemented.  See ortho for knee left bursitis   Can try knee pad  Cont B12 and folate at goal--cont supplement  Stop smoking

## 2022-04-26 ENCOUNTER — OFFICE VISIT (OUTPATIENT)
Dept: ORTHOPEDIC SURGERY | Facility: CLINIC | Age: 57
End: 2022-04-26

## 2022-04-26 VITALS — HEIGHT: 64 IN | WEIGHT: 162 LBS | TEMPERATURE: 96.5 F | BODY MASS INDEX: 27.66 KG/M2

## 2022-04-26 DIAGNOSIS — M76.52 PATELLAR TENDINITIS OF LEFT KNEE: Primary | ICD-10-CM

## 2022-04-26 PROCEDURE — 73562 X-RAY EXAM OF KNEE 3: CPT | Performed by: NURSE PRACTITIONER

## 2022-04-26 PROCEDURE — 99214 OFFICE O/P EST MOD 30 MIN: CPT | Performed by: NURSE PRACTITIONER

## 2022-04-26 RX ORDER — DICLOFENAC SODIUM 20 MG/G
1 SOLUTION TOPICAL 2 TIMES DAILY
Qty: 112 G | Refills: 3 | Status: SHIPPED | OUTPATIENT
Start: 2022-04-26

## 2022-04-26 NOTE — PROGRESS NOTES
[facility]  New Problem      Patient Name: Anabella Baum  : 1965  Primary Care Physician: Manuela Baum PA-C        Chief Complaint: Left knee pain    HPI:   Anabella Baum is a 57 y.o. year old who presents today for evaluation of left knee pain.  This is a 57-year-old female with complaints of left knee pain that started about a month or 2 ago.  She denies any history of event or injury, no new activities but she is interested in doing more gardening now that the weather is getting warmer and she has excruciating pain anytime she kneels on her knee or touches it.  She localizes her symptoms to the inferior pole of her patella.  She is not noticed any swelling or bruising, no redness or warmth in the area.  It is exquisitely tender to touch.  Does not bother her too much when she is up and walking around.  She has not done much in the way of conservative treatments to date other than avoiding aggravating activities.      Past Medical/Surgical, Social and Family History:  I have reviewed and/or updated pertinent history as noted in the medical record including:  Past Medical History:   Diagnosis Date   • Allergic reaction    • Benign essential hypertension    • Bruit of left carotid artery    • Bursitis of right hip    • Carotid bruit    • CTS (carpal tunnel syndrome)     left   • GERD (gastroesophageal reflux disease)    • History of CT scan of abdomen 2011    and pelvis without contrast; left 5mm renal stone with hydronephrosis   • History of Doppler ultrasound 2013    carotid; right is neg; left internal carotid with 1-15% plaque and in left bifurcation; has patent blood flow   • HLD (hyperlipidemia)    • Kidney calculus    • Quadruplet gestation    • Rash, skin    • SAB (spontaneous )    • Sprain, strain    • Vitamin D deficiency      Past Surgical History:   Procedure Laterality Date   •  SECTION     •  SECTION     • CHOLECYSTECTOMY     • COLONOSCOPY   2018   • ENDOSCOPY  2015    Dr. Goff; antral gastritis   • ENDOSCOPY N/A 2020    Procedure: ESOPHAGOGASTRODUODENOSCOPY WITH COLD BIOPSIES, ARIAS DILATION 46FR;  Surgeon: Maite Rivera MD;  Location: [facility] ENDOSCOPY;  Service: Gastroenterology;  Laterality: N/A;  PRE- DYSPHAGIA, GERD  POST- IRREG Z LINE, GASTRITIS, R/O EOE   • LITHOTRIPSY  2011    L ESWL     Social History     Occupational History   • Occupation: [employer]    Tobacco Use   • Smoking status: Current Every Day Smoker     Packs/day: 1.00     Years: 35.00     Pack years: 35.00     Types: Cigarettes   • Smokeless tobacco: Never Used   • Tobacco comment: rash with e cig  Caffeine use 1-2 sodas daily   Substance and Sexual Activity   • Alcohol use: Yes     Comment: occ   • Drug use: No   • Sexual activity: Yes     Partners: Male     Birth control/protection: Partner's vasectomy      Social History     Social History Narrative   • Not on file     Family History   Problem Relation Age of Onset   • Heart disease Mother    • Hypertension Mother    • Lung disease Mother    • COPD Mother            • Diabetes Mother            • Hyperlipidemia Mother    • Kidney disease Mother            • Vision loss Mother    • Stroke Mother    • Anxiety disorder Sister    • Hypertension Sister    • Hyperlipidemia Sister    • Breast cancer Sister 47        genetic testing neg    • Cancer Sister    • Breast cancer Maternal Aunt 65        genetic testing negative    • Thyroid disease Maternal Aunt         Hashimoto   • Colon cancer Cousin 40        maternal    • Arthritis Sister    • Hyperlipidemia Sister    • Hyperlipidemia Sister    • Thyroid disease Maternal Aunt         Hashimoto   • Diabetes Father    • Heart disease Maternal Grandfather        Allergies:   Allergies   Allergen Reactions   • Amoxicillin-Pot Clavulanate GI Intolerance and Unknown - High Severity     No rash; can take cephalosporin   • Levofloxacin Other  (See Comments) and Unknown - High Severity     Joint pain   • Ibuprofen Rash and Unknown - High Severity       Medications:   Home Medications:  Current Outpatient Medications on File Prior to Visit   Medication Sig   • aspirin 81 MG tablet Take 81 mg by mouth daily.   • atorvastatin (Lipitor) 80 MG tablet Take 1 tablet by mouth Daily. For cholesterol and new dose   • cetirizine (zyrTEC) 5 MG tablet Take 5 mg by mouth Daily.   • Cholecalciferol (VITAMIN D) 2000 units tablet 2 PO daily   • Cyanocobalamin (Vitamin B-12) 1000 MCG sublingual tablet DISSOLVE 1 TABLET UNDER THE TONGUE DAILY   • lansoprazole (PREVACID) 30 MG capsule Take 1 capsule by mouth Daily. for GERD   • lisinopril (PRINIVIL,ZESTRIL) 10 MG tablet Take 1 tablet by mouth Daily. for blood pressure   • Multiple Vitamins-Minerals (MULTIVITAMIN WITH MINERALS) tablet Take 1 tablet by mouth daily.     No current facility-administered medications on file prior to visit.         ROS:  14 point review of systems was negative except as listed in the HPI seasonal allergies, some back pain and neck pain and stiffness intermittently.    Physical Exam:   57 y.o. female  Body mass index is 27.79 kg/m²., 73.5 kg (162 lb)  Vitals:    04/26/22 1315   Temp: 96.5 °F (35.8 °C)     General: Alert, cooperative, appears well and in no observable distress. Appears stated age and BMI as listed above.  HEENT: Normocephalic, atraumatic on external visual inspection.  CV: No significant peripheral edema.  Respiratory: Normal respiratory effort.  Skin: Warm & well perfused; appropriate skin turgor.  Psych: Appropriate mood & affect.  Neuro: Gross sensation and motor intact in affected extremity/extremities.  Vascular: Peripheral pulses palpable in affected extremity/extremities.     MSK Exam:  Physical exam of the left knee reveals no deformities or wounds.  She has no significant redness or swelling or warmth.  She is exquisitely tender to the touch at the distalmost portion of the  patella pole at the insertion of the tendon.  No crepitus and range of motion.  Her quad strength is reasonable.  Her ligamentous exam is grossly stable.  Calves are soft and nontender.  Physical exam of the right knee is normal without pain or limitation.    Radiology:    The following X-rays were ordered/reviewed today to evaluate the patient's symptoms: Single Knee: AP standing and sunrise views of both knees, and lateral view of painful knee show No significant degenerative changes.  She has good preservation of joint space bilaterally on the AP view, no significant patellofemoral changes of note.  Really overall no acute bony pathology to account for reported pain..    Procedure:   N/A      Misc. Data/Labs: N/A    Assessment & Plan:    This is a 57-year-old female with I suspect patella tendinitis.  Usually with bursitis you would see some swelling her fluid and she does not have that and no history of that.  She is exquisitely tender to touch over the distal pole of the patella.  Plan is to start her on Pennsaid topically and like her to use that twice a day, I have also asked her to apply ice to the area 2-3 times a day with or without pain symptoms.  I will have her continue using over-the-counter Aleve she is able to tolerate that despite her ibuprofen allergy.  I did give her specific precautions with the pen said and directions for use and history of an allergic rash using ibuprofen.  I like to see her back in 2 to 3 weeks if she starts getting better we might consider physical therapy for ultrasound, stretching and strengthening.  We could consider referral to sports medicine for consideration of platelets if we are unable to get her better with more conservative measures.  She will avoid aggravating activities such as kneeling and I have given her a patella tendon strap which seemed to alleviate her symptoms today in the office.    Patient encouraged to call with questions or concerns prior to follow  up. , Patient instructed on appropriate use of NSAIDs and signs/symptoms of adverse reactions. Patient advised to stop medications and notify the office in the event of any noted side effects.  and Patient instructed on use of ICE therapy PRN for pain and swelling.      ICD-10-CM ICD-9-CM   1. Patellar tendinitis of left knee  M76.52 726.64     New Medications Ordered This Visit   Medications   • Diclofenac Sodium (Pennsaid) 2 % solution     Sig: Apply 1 Pump topically 2 (Two) Times a Day.     Dispense:  112 g     Refill:  3     No orders of the defined types were placed in this encounter.    Return in about 3 weeks (around 5/17/2022) for Recheck.    SUSANNA Antonio      Dictated Utilizing Dragon Dictation

## 2022-06-23 ENCOUNTER — TELEPHONE (OUTPATIENT)
Dept: OBSTETRICS AND GYNECOLOGY | Age: 57
End: 2022-06-23

## 2022-06-23 NOTE — TELEPHONE ENCOUNTER
Caller: Anabella Baum    Relationship to patient: Self    Best call back number:     Chief complaint: VAGINAL ITCHING & VAGINAL SORE    Type of visit: NEW GYN    Requested date: AS SOON AS POSSIBLE    Additional notes:  PT STATED THAT SHE HAS SEVERE VAGINAL ITCHING PLEASE CALL BACK TO SCHEDULE AN APPOINTMENT.   NEW PATIENT REFERRAL IN CHART

## 2022-09-30 RX ORDER — LANSOPRAZOLE 30 MG/1
CAPSULE, DELAYED RELEASE ORAL
Qty: 90 CAPSULE | Refills: 3 | Status: SHIPPED | OUTPATIENT
Start: 2022-09-30

## 2022-10-24 RX ORDER — MAGNESIUM 200 MG
TABLET ORAL
Qty: 90 TABLET | Refills: 3 | Status: SHIPPED | OUTPATIENT
Start: 2022-10-24

## 2022-11-09 RX ORDER — LISINOPRIL 10 MG/1
TABLET ORAL
Qty: 90 TABLET | Refills: 3 | OUTPATIENT
Start: 2022-11-09

## 2022-11-13 ENCOUNTER — APPOINTMENT (OUTPATIENT)
Dept: GENERAL RADIOLOGY | Facility: HOSPITAL | Age: 57
End: 2022-11-13

## 2022-11-13 ENCOUNTER — HOSPITAL ENCOUNTER (EMERGENCY)
Facility: HOSPITAL | Age: 57
Discharge: HOME | End: 2022-11-13
Attending: EMERGENCY MEDICINE | Admitting: EMERGENCY MEDICINE

## 2022-11-13 VITALS
DIASTOLIC BLOOD PRESSURE: 80 MMHG | RESPIRATION RATE: 16 BRPM | TEMPERATURE: 98.2 F | OXYGEN SATURATION: 100 % | HEIGHT: 64 IN | BODY MASS INDEX: 28.17 KG/M2 | WEIGHT: 165 LBS | SYSTOLIC BLOOD PRESSURE: 132 MMHG | HEART RATE: 116 BPM
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
## 2022-11-13 DIAGNOSIS — S82.851A CLOSED DISPLACED TRIMALLEOLAR FRACTURE OF RIGHT ANKLE, INITIAL ENCOUNTER: Primary | ICD-10-CM

## 2022-11-13 PROCEDURE — 73610 X-RAY EXAM OF ANKLE: CPT

## 2022-11-13 PROCEDURE — 99283 EMERGENCY DEPT VISIT LOW MDM: CPT

## 2022-11-13 PROCEDURE — 63710000001 ONDANSETRON ODT 4 MG TABLET DISPERSIBLE: Performed by: PHYSICIAN ASSISTANT

## 2022-11-13 RX ORDER — ONDANSETRON 4 MG/1
4 TABLET, ORALLY DISINTEGRATING ORAL EVERY 8 HOURS PRN
Qty: 12 TABLET | Refills: 0 | Status: SHIPPED | OUTPATIENT
Start: 2022-11-13

## 2022-11-13 RX ORDER — HYDROCODONE BITARTRATE AND ACETAMINOPHEN 7.5; 325 MG/1; MG/1
1 TABLET ORAL EVERY 4 HOURS PRN
Qty: 18 TABLET | Refills: 0 | Status: SHIPPED | OUTPATIENT
Start: 2022-11-13 | End: 2023-01-03

## 2022-11-13 RX ORDER — ONDANSETRON 4 MG/1
4 TABLET, ORALLY DISINTEGRATING ORAL ONCE
Status: COMPLETED | OUTPATIENT
Start: 2022-11-13 | End: 2022-11-13

## 2022-11-13 RX ORDER — HYDROCODONE BITARTRATE AND ACETAMINOPHEN 7.5; 325 MG/1; MG/1
1 TABLET ORAL ONCE
Status: COMPLETED | OUTPATIENT
Start: 2022-11-13 | End: 2022-11-13

## 2022-11-13 RX ADMIN — ONDANSETRON 4 MG: 4 TABLET, ORALLY DISINTEGRATING ORAL at 06:20

## 2022-11-13 RX ADMIN — HYDROCODONE BITARTRATE AND ACETAMINOPHEN 1 TABLET: 7.5; 325 TABLET ORAL at 05:28

## 2022-11-13 NOTE — ED PROVIDER NOTES
EMERGENCY DEPARTMENT ENCOUNTER    Room Number:    Date of encounter:  2022  PCP: Manuela Baum, PACallieC  Historian: Patient      HPI:  Chief Complaint: RIGHT ankle injury   A complete HPI/ROS/PMH/PSH/SH/FH are unobtainable due to: N/A    Context: Anabella Baum is a 57 y.o. female who presents to the ED c/o right ankle injury.  Patient states that around 8:00 last night she was walking down the steps when she tripped on her cat, and rolled her right ankle causing severe pain as well as a crack sensation.  Patient states that he did had increased pain, swelling, and inability to ambulate on the foot since that time.  Denies any other injury, no head injury, neck pain, back pain, no numbness or tingling.      PAST MEDICAL HISTORY  Active Ambulatory Problems     Diagnosis Date Noted   • Allergic reaction    • Carotid bruit    • GERD (gastroesophageal reflux disease)    • HLD (hyperlipidemia)    • CTS (carpal tunnel syndrome)    • Rash, skin    • Vitamin D deficiency    • Dyshidrotic eczema 2016   • Tobacco abuse 2018   • Family history of breast cancer gene mutation in first degree relative 2018   • Benign essential HTN 10/09/2019   • Blood pressure elevated without history of HTN 2013   • Stenosis of left carotid artery 2020   • Esophageal dysphagia 2020   • Gastroesophageal reflux disease 2020   • Benign hemangioma 2021   • Overweight with body mass index (BMI) 25.0-29.9 2022   • Essential hypertension 2022   • Tobacco user 2022     Resolved Ambulatory Problems     Diagnosis Date Noted   • No Resolved Ambulatory Problems     Past Medical History:   Diagnosis Date   • Benign essential hypertension    • Bruit of left carotid artery    • Bursitis of right hip    • History of CT scan of abdomen 2011   • History of Doppler ultrasound 2013   • Kidney calculus    • Quadruplet gestation    • SAB (spontaneous )    • Sprain, strain           PAST SURGICAL HISTORY  Past Surgical History:   Procedure Laterality Date   •  SECTION     •  SECTION     • CHOLECYSTECTOMY     • COLONOSCOPY     • ENDOSCOPY  2015    Dr. Goff; antral gastritis   • ENDOSCOPY N/A 2020    Procedure: ESOPHAGOGASTRODUODENOSCOPY WITH COLD BIOPSIES, ARIAS DILATION 46FR;  Surgeon: Maite Rivera MD;  Location: [facility] ENDOSCOPY;  Service: Gastroenterology;  Laterality: N/A;  PRE- DYSPHAGIA, GERD  POST- IRREG Z LINE, GASTRITIS, R/O EOE   • LITHOTRIPSY  2011    L ESWL         FAMILY HISTORY  Family History   Problem Relation Age of Onset   • Heart disease Mother    • Hypertension Mother    • Lung disease Mother    • COPD Mother            • Diabetes Mother            • Hyperlipidemia Mother    • Kidney disease Mother            • Vision loss Mother    • Stroke Mother    • Anxiety disorder Sister    • Hypertension Sister    • Hyperlipidemia Sister    • Breast cancer Sister 47        genetic testing neg    • Cancer Sister    • Breast cancer Maternal Aunt 65        genetic testing negative    • Thyroid disease Maternal Aunt         Hashimoto   • Colon cancer Cousin 40        maternal    • Arthritis Sister    • Hyperlipidemia Sister    • Hyperlipidemia Sister    • Thyroid disease Maternal Aunt         Hashimoto   • Diabetes Father    • Heart disease Maternal Grandfather          SOCIAL HISTORY  Social History     Socioeconomic History   • Marital status:    Tobacco Use   • Smoking status: Every Day     Packs/day: 1.00     Years: 35.00     Pack years: 35.00     Types: Cigarettes   • Smokeless tobacco: Never   • Tobacco comments:     rash with e cig  Caffeine use 1-2 sodas daily   Substance and Sexual Activity   • Alcohol use: Yes     Comment: occ   • Drug use: No   • Sexual activity: Yes     Partners: Male     Birth control/protection: Vasectomy         ALLERGIES  Amoxicillin-pot clavulanate, Levofloxacin,  and Ibuprofen        REVIEW OF SYSTEMS  Review of Systems     All systems reviewed and negative except for those discussed in HPI.       PHYSICAL EXAM    I have reviewed the triage vital signs and nursing notes.    ED Triage Vitals [11/13/22 0446]   Temp Heart Rate Resp BP SpO2   98.2 °F (36.8 °C) 116 16 132/80 100 %      Temp src Heart Rate Source Patient Position BP Location FiO2 (%)   Tympanic -- -- -- --       Physical Exam  GENERAL: Alert and orient x4, moderate pain  HENT: no hemotympanum, no dental injury or malocclusion, head atraumatic/normocephalic  EYES: no scleral icterus  CV: regular rhythm, regular rate, intact pedal pulses  RESPIRATORY: normal effort  MUSCULOSKELETAL: moderate soft tissue swelling and tenderness to the right ankle, with some early ecchymosis  NEURO: alert, moves all extremities, follows commands  SKIN: warm, dry, intact        LAB RESULTS  No results found for this or any previous visit (from the past 24 hour(s)).    Ordered the above labs and independently reviewed the results.        RADIOLOGY  XR Ankle 3+ View Right    Result Date: 11/13/2022  3 VIEWS RIGHT ANKLE  HISTORY: ,  COMPARISON: None available.  FINDINGS: There is a comminuted obliquely oriented fracture of the right fibular diametaphysis, as well as a transversely oriented displaced fracture of the medial malleolus. Posterior malleolar fracture is also suspected. There is soft tissue swelling about the ankle. No additional fractures are seen.      Fractures of the distal fibula and tibia.  This report was finalized on 11/13/2022 5:26 AM by Dr. Gloria Echevarria M.D.        I ordered the above noted radiological studies. Reviewed by me and discussed with radiologist.  See dictation for official radiology interpretation.      PROCEDURES    Splint - Cast - Strapping    Date/Time: 11/13/2022 5:56 AM  Performed by: Ethan Larsen PA  Authorized by: Jeff Gagnon MD     Consent:     Consent obtained:  Verbal     Consent given by:  Patient    Risks discussed:  Pain and swelling    Alternatives discussed:  No treatment  Universal protocol:     Procedure explained and questions answered to patient or proxy's satisfaction: yes      Imaging studies available: yes      Patient identity confirmed:  Verbally with patient  Pre-procedure details:     Distal neurologic exam:  Normal    Distal perfusion: distal pulses strong    Procedure details:     Location:  Ankle    Ankle location:  R ankle    Splint type:  Short leg and ankle stirrup    Supplies:  Cotton padding, fiberglass and elastic bandage  Post-procedure details:     Distal neurologic exam:  Normal    Distal perfusion: distal pulses strong      Procedure completion:  Tolerated well, no immediate complications          MEDICATIONS GIVEN IN ER    Medications   ondansetron ODT (ZOFRAN-ODT) disintegrating tablet 4 mg (has no administration in time range)   HYDROcodone-acetaminophen (NORCO) 7.5-325 MG per tablet 1 tablet (1 tablet Oral Given 11/13/22 0528)         PROGRESS, DATA ANALYSIS, CONSULTS, AND MEDICAL DECISION MAKING    All labs have been independently reviewed by me.  All radiology studies have been reviewed by me and discussed with radiologist dictating the report.   EKG's independently viewed and interpreted by me.  Discussion below represents my analysis of pertinent findings related to patient's condition, differential diagnosis, treatment plan and final disposition.    DDx: Includes not limited to ankle fracture, ankle sprain, foot fracture, dislocation    ED Course as of 11/13/22 0616   Sun Nov 13, 2022   0545 Discussed strict return to ER precautions, compartment syndrome precautions, and reasons to return to care.  Patient expressed understanding and agrees with plan. [TOMMY]      ED Course User Index  [TOMMY] Ethan Larsen PA       MDM: Patient was found to have a trimalleolar right ankle fracture on x-ray she has been placed in a stirrup and posterior short leg  splint and given orthopedic follow-up as well as compartment syndrome precautions and reasons to return to care.  Patient will be treated with splinting, given crutches and nonweightbearing, ice and elevation, and pain medicine.  Vital signs are stable, patient is safe for discharge home.    PPE: The patient wore a surgical mask throughout the entire patient encounter. I wore an N95.    AS OF 06:16 EST VITALS:    BP - 132/80  HR - 116  TEMP - 98.2 °F (36.8 °C) (Tympanic)  O2 SATS - 100%        DIAGNOSIS  Final diagnoses:   Closed displaced trimalleolar fracture of right ankle, initial encounter   Fall on stairs, initial encounter         DISPOSITION  DISCHARGE    Patient discharged in stable condition.    Reviewed implications of results, diagnosis, meds, responsibility to follow up, warning signs and symptoms of possible worsening, potential complications and reasons to return to ER.    Patient/Family voiced understanding of above instructions.    Discussed plan for discharge, as there is no emergent indication for admission. Patient referred to primary care provider for BP management due to today's BP. Pt/family is agreeable and understands need for follow up and repeat testing.  Pt is aware that discharge does not mean that nothing is wrong but it indicates no emergency is present that requires admission and they must continue care with follow-up as given below or physician of their choice.     FOLLOW-UP  Zachary Sagastume MD  [address] [place], [place] [id number] [phone]    Schedule an appointment as soon as possible for a visit       Randal Quigley MD  [address] [place], [place] [id number] [phone]    Schedule an appointment as soon as possible for a visit            Medication List      New Prescriptions    HYDROcodone-acetaminophen 7.5-325 MG per tablet  Commonly known as: NORCO  Take 1 tablet by mouth Every 4 (Four) Hours As Needed for Moderate Pain.     ondansetron ODT  4 MG disintegrating tablet  Commonly known as: Zofran ODT  Place 1 tablet on the tongue Every 8 (Eight) Hours As Needed for Nausea.           Where to Get Your Medications      These medications were sent to [id number] - [place], [place] - [address] AT [facility] [address] [facility] [phone] [facility] [phone]   [address], [facility] [address]    Phone: [phone]   · HYDROcodone-acetaminophen 7.5-325 MG per tablet  · ondansetron ODT 4 MG disintegrating tablet         Note Disclaimer: At [facility], we believe that sharing information builds trust and better relationships. You are receiving this note because you recently visited [facility]. It is possible you will see health information before a provider has talked with you about it. This kind of information can be easy to misunderstand. To help you fully understand what it means for your health, we urge you to discuss this note with your provider.       Ethan Larsen PA  11/13/22 0558       Ethan Larsen PA  11/13/22 0616

## 2022-11-13 NOTE — ED PROVIDER NOTES
MD ATTESTATION NOTE    The TANA and I have discussed this patient's history, physical exam, and treatment plan.  I have reviewed the documentation and personally had a face to face interaction with the patient. I affirm the documentation and agree with the treatment and plan.  The attached note describes my personal findings.      I provided a substantive portion of the care of the patient.  I personally performed the physical exam in its entirety, and below are my findings.  For this patient encounter, the patient wore surgical mask, I wore full protective PPE including N95 and eye protection.      Brief HPI: This patient is a 57-year-old female presenting to the emergency room today with a right ankle injury after falling down the last stair at home.  She has been nonambulatory secondary to the pain but denies any further trauma.    PHYSICAL EXAM  ED Triage Vitals [11/13/22 0446]   Temp Heart Rate Resp BP SpO2   98.2 °F (36.8 °C) 116 16 132/80 100 %      Temp src Heart Rate Source Patient Position BP Location FiO2 (%)   Tympanic -- -- -- --         GENERAL: In moderate distress, nontoxic in appearance  HENT: nares patent  EYES: no scleral icterus  CV: regular rhythm, normal rate  RESPIRATORY: normal effort, lungs clear bilaterally  MUSCULOSKELETAL: Swelling with bruising present to the right ankle, pulses palpable  NEURO: alert, moves all extremities, follows commands  PSYCH:  calm, cooperative  SKIN: warm, dry    Vital signs and nursing notes reviewed.        Plan: We will treat the patient's discomfort, obtain an x-ray of the right ankle, and splint accordingly following.       Jeff Gagnon MD  11/13/22 0588

## 2022-11-13 NOTE — DISCHARGE INSTRUCTIONS
Wear splint, use crutches/nonweightbearing, apply ice and elevate, pain medicine as needed, call first thing Monday morning to schedule follow up with the orthopedist.  Return to care if unable to control pain, develop numbness or tingling, or with further concerns.

## 2022-11-13 NOTE — ED NOTES
Patient to ED via PV c/o R ankle pain. Patient states she was walking down the steps, tripped over her cat and rolled her ankle. Patient unable to bear weight on ankle. Swelling noted to the R ankle compared to the L.

## 2022-11-15 ENCOUNTER — LAB (OUTPATIENT)
Dept: LAB | Facility: HOSPITAL | Age: 57
End: 2022-11-15

## 2022-11-15 ENCOUNTER — TRANSCRIBE ORDERS (OUTPATIENT)
Dept: ADMINISTRATIVE | Facility: HOSPITAL | Age: 57
End: 2022-11-15

## 2022-11-15 ENCOUNTER — HOSPITAL ENCOUNTER (OUTPATIENT)
Dept: CARDIOLOGY | Facility: HOSPITAL | Age: 57
Discharge: HOME | End: 2022-11-15

## 2022-11-15 DIAGNOSIS — Z01.818 PRE-OP EXAM: ICD-10-CM

## 2022-11-15 DIAGNOSIS — I10 ESSENTIAL HYPERTENSION, MALIGNANT: Primary | ICD-10-CM

## 2022-11-15 DIAGNOSIS — I10 ESSENTIAL HYPERTENSION, MALIGNANT: ICD-10-CM

## 2022-11-15 LAB
ANION GAP SERPL CALCULATED.3IONS-SCNC: 11.1 MMOL/L (ref 5–15)
BUN SERPL-MCNC: 10 MG/DL (ref 6–20)
BUN/CREAT SERPL: 16.1 (ref 7–25)
CALCIUM SPEC-SCNC: 9.3 MG/DL (ref 8.6–10.5)
CHLORIDE SERPL-SCNC: 94 MMOL/L (ref 98–107)
CO2 SERPL-SCNC: 29.9 MMOL/L (ref 22–29)
CREAT SERPL-MCNC: 0.62 MG/DL (ref 0.57–1)
DEPRECATED RDW RBC AUTO: 37.9 FL (ref 37–54)
EGFRCR SERPLBLD CKD-EPI 2021: 103.4 ML/MIN/1.73
ERYTHROCYTE [DISTWIDTH] IN BLOOD BY AUTOMATED COUNT: 11.8 % (ref 12.3–15.4)
GLUCOSE SERPL-MCNC: 95 MG/DL (ref 65–99)
HCT VFR BLD AUTO: 37.1 % (ref 34–46.6)
HGB BLD-MCNC: 12.9 G/DL (ref 12–15.9)
MCH RBC QN AUTO: 30.5 PG (ref 26.6–33)
MCHC RBC AUTO-ENTMCNC: 34.8 G/DL (ref 31.5–35.7)
MCV RBC AUTO: 87.7 FL (ref 79–97)
PLATELET # BLD AUTO: 240 10*3/MM3 (ref 140–450)
PMV BLD AUTO: 11.2 FL (ref 6–12)
POTASSIUM SERPL-SCNC: 4.1 MMOL/L (ref 3.5–5.2)
QT INTERVAL: 351 MS
RBC # BLD AUTO: 4.23 10*6/MM3 (ref 3.77–5.28)
SODIUM SERPL-SCNC: 135 MMOL/L (ref 136–145)
WBC NRBC COR # BLD: 12.31 10*3/MM3 (ref 3.4–10.8)

## 2022-11-15 PROCEDURE — 85027 COMPLETE CBC AUTOMATED: CPT

## 2022-11-15 PROCEDURE — 36415 COLL VENOUS BLD VENIPUNCTURE: CPT

## 2022-11-15 PROCEDURE — 93010 ELECTROCARDIOGRAM REPORT: CPT | Performed by: INTERNAL MEDICINE

## 2022-11-15 PROCEDURE — 93005 ELECTROCARDIOGRAM TRACING: CPT | Performed by: SPECIALIST

## 2022-11-15 PROCEDURE — 80048 BASIC METABOLIC PNL TOTAL CA: CPT

## 2022-11-16 ENCOUNTER — TELEPHONE (OUTPATIENT)
Dept: ORTHOPEDICS | Facility: OTHER | Age: 57
End: 2022-11-16

## 2022-11-16 NOTE — TELEPHONE ENCOUNTER
I  Caller: RUPERTO POWELL    Relationship to patient: SELF    Best call back number: [phone]    Chief complaint: FOLLOW UP     Type of visit: FOLLOW UP     Requested date: NA    If rescheduling, when is the original appointment: NA     Additional notes: PATIENT IS WANTING TO KNOW WHY SHE WAS SCHEDULED FOR THAT FOLLOW UP ON 11/18/2022

## 2022-12-16 DIAGNOSIS — Z72.0 TOBACCO ABUSE: Primary | ICD-10-CM

## 2022-12-21 RX ORDER — ATORVASTATIN CALCIUM 80 MG/1
TABLET, FILM COATED ORAL
Qty: 90 TABLET | Refills: 0 | Status: SHIPPED | OUTPATIENT
Start: 2022-12-21 | End: 2023-03-21

## 2023-01-03 ENCOUNTER — OFFICE VISIT (OUTPATIENT)
Dept: FAMILY MEDICINE CLINIC | Facility: CLINIC | Age: 58
End: 2023-01-03
Payer: COMMERCIAL

## 2023-01-03 DIAGNOSIS — J06.9 UPPER RESPIRATORY TRACT INFECTION, UNSPECIFIED TYPE: Primary | ICD-10-CM

## 2023-01-03 DIAGNOSIS — R05.8 PRODUCTIVE COUGH: ICD-10-CM

## 2023-01-03 PROCEDURE — 99442 PR PHYS/QHP TELEPHONE EVALUATION 11-20 MIN: CPT

## 2023-01-03 RX ORDER — BENZONATATE 200 MG/1
200 CAPSULE ORAL 3 TIMES DAILY PRN
Qty: 30 CAPSULE | Refills: 0 | Status: SHIPPED | OUTPATIENT
Start: 2023-01-03 | End: 2023-01-13

## 2023-01-03 RX ORDER — FLUCONAZOLE 150 MG/1
150 TABLET ORAL ONCE
Qty: 1 TABLET | Refills: 0 | Status: SHIPPED | OUTPATIENT
Start: 2023-01-03 | End: 2023-01-03

## 2023-01-03 RX ORDER — AZITHROMYCIN 250 MG/1
TABLET, FILM COATED ORAL
Qty: 6 TABLET | Refills: 0 | Status: SHIPPED | OUTPATIENT
Start: 2023-01-03 | End: 2023-01-08

## 2023-01-03 RX ORDER — DEXTROMETHORPHAN HYDROBROMIDE AND PROMETHAZINE HYDROCHLORIDE 15; 6.25 MG/5ML; MG/5ML
5 SYRUP ORAL 4 TIMES DAILY PRN
Qty: 118 ML | Refills: 0 | Status: SHIPPED | OUTPATIENT
Start: 2023-01-03

## 2023-01-03 NOTE — PROGRESS NOTES
You have chosen to receive care through a telephone visit. Do you consent to use a telephone visit for your medical care today? Yes   Mode of Visit: Telephone  The visit included telephone interaction. No technical issues occurred during this visit.   I spent 14 minutes caring for Anabella on this date of service. This time includes time spent by me in the following activities:preparing for the visit, obtaining and/or reviewing a separately obtained history, performing a medically appropriate examination and/or evaluation , counseling and educating the patient/family/caregiver, ordering medications, tests, or procedures and documenting information in the medical record    This visit has been rescheduled as a phone visit to comply with patient safety concerns in accordance with CDC recommendations. Total time of discussion was 14 minutes.     Location of the patient: Home  Location of the provider: Home     Subjective       Chief Complaint   Patient presents with   • Cough   • Sinus Drainage         History of Present Illness       Anabella is a 58 y.o. female who presents to  [address] [facility] today.    She reports upper respiratory congestion and cough. Symptoms include congestion, post nasal drip and productive cough.  Onset of symptoms was 8 days ago, gradually worsening since that time. Patient denies shortness of breath, wheezing, hemoptysis, pleuritic chest pain, fever, dyspnea on exertion, ear drainage, eye discharge, diarrhea, vomiting, vertigo, sneezing, chills, sweats, sinus pain, epistaxis, high fever, joint pain.   Evaluation to date: none Treatment to date:  OTC oral decongestants.     Review of Systems   Constitutional: Negative for chills, fatigue and fever.   HENT: Positive for congestion and postnasal drip. Negative for ear pain, sinus pressure, sore throat and trouble swallowing.    Eyes: Negative for visual disturbance.   Respiratory: Positive  for cough (productive). Negative for apnea, chest tightness, shortness of breath and wheezing.    Cardiovascular: Negative for chest pain and palpitations.   Gastrointestinal: Negative for abdominal pain, constipation, diarrhea, nausea and vomiting.   Genitourinary: Negative for dysuria, frequency and urgency.   Musculoskeletal: Negative for back pain.   Skin: Negative for rash.   Neurological: Negative for dizziness, syncope and light-headedness.   Psychiatric/Behavioral: Negative for behavioral problems and sleep disturbance.             Objective                         Assessment & Plan     Assessment and Plan      Diagnoses and all orders for this visit:    1. Upper respiratory tract infection, unspecified type (Primary)  -     promethazine-dextromethorphan (PROMETHAZINE-DM) 6.25-15 MG/5ML syrup; Take 5 mL by mouth 4 (Four) Times a Day As Needed for Cough.  Dispense: 118 mL; Refill: 0  -     benzonatate (TESSALON) 200 MG capsule; Take 1 capsule by mouth 3 (Three) Times a Day As Needed for Cough for up to 10 days.  Dispense: 30 capsule; Refill: 0  -     azithromycin (Zithromax Z-Terry) 250 MG tablet; Take 2 tablets the first day, then 1 tablet daily for 4 days.  Dispense: 6 tablet; Refill: 0    2. Productive cough  -     promethazine-dextromethorphan (PROMETHAZINE-DM) 6.25-15 MG/5ML syrup; Take 5 mL by mouth 4 (Four) Times a Day As Needed for Cough.  Dispense: 118 mL; Refill: 0  -     benzonatate (TESSALON) 200 MG capsule; Take 1 capsule by mouth 3 (Three) Times a Day As Needed for Cough for up to 10 days.  Dispense: 30 capsule; Refill: 0  -     azithromycin (Zithromax Z-Terry) 250 MG tablet; Take 2 tablets the first day, then 1 tablet daily for 4 days.  Dispense: 6 tablet; Refill: 0    Other orders  -     fluconazole (Diflucan) 150 MG tablet; Take 1 tablet by mouth 1 (One) Time for 1 dose.  Dispense: 1 tablet; Refill: 0          Follow Up  Wrapup  Review (Popup)  Communications :23}    Return if symptoms worsen  or fail to improve.    -Take medication as prescribed.  Take a daily probiotic.  -Monitor for fever and take Tylenol as needed.  Drink plenty of fluids and get plenty of rest.  -Use cool-mist humidifier as needed.  -Seek immediate medical attention for fever unrelieved by Tylenol, chest pain, shortness of breath, decreased oxygen saturations, sharp pain with breathing, or any other worsening signs or symptoms.  -Return to the office is symptoms worsen or do not improve.

## 2023-03-16 RX ORDER — LISINOPRIL 10 MG/1
TABLET ORAL
Qty: 90 TABLET | Refills: 3 | OUTPATIENT
Start: 2023-03-16

## 2023-03-21 RX ORDER — ATORVASTATIN CALCIUM 80 MG/1
TABLET, FILM COATED ORAL
Qty: 90 TABLET | Refills: 0 | Status: SHIPPED | OUTPATIENT
Start: 2023-03-21

## 2023-04-20 ENCOUNTER — OFFICE VISIT (OUTPATIENT)
Dept: FAMILY MEDICINE CLINIC | Facility: CLINIC | Age: 58
End: 2023-04-20
Payer: COMMERCIAL

## 2023-04-20 VITALS
BODY MASS INDEX: 26.46 KG/M2 | HEART RATE: 72 BPM | TEMPERATURE: 97.5 F | SYSTOLIC BLOOD PRESSURE: 144 MMHG | RESPIRATION RATE: 16 BRPM | HEIGHT: 64 IN | DIASTOLIC BLOOD PRESSURE: 80 MMHG | WEIGHT: 155 LBS | OXYGEN SATURATION: 98 %

## 2023-04-20 DIAGNOSIS — E78.2 MIXED HYPERLIPIDEMIA: ICD-10-CM

## 2023-04-20 DIAGNOSIS — E55.9 VITAMIN D DEFICIENCY: ICD-10-CM

## 2023-04-20 DIAGNOSIS — Z12.31 ENCOUNTER FOR SCREENING MAMMOGRAM FOR BREAST CANCER: ICD-10-CM

## 2023-04-20 DIAGNOSIS — I10 BENIGN ESSENTIAL HTN: Primary | ICD-10-CM

## 2023-04-20 DIAGNOSIS — T75.3XXA MOTION SICKNESS, INITIAL ENCOUNTER: ICD-10-CM

## 2023-04-20 DIAGNOSIS — Z78.0 POST-MENOPAUSAL: ICD-10-CM

## 2023-04-20 DIAGNOSIS — R09.89 BILATERAL CAROTID BRUITS: ICD-10-CM

## 2023-04-20 DIAGNOSIS — K21.9 GASTROESOPHAGEAL REFLUX DISEASE WITHOUT ESOPHAGITIS: ICD-10-CM

## 2023-04-20 DIAGNOSIS — Z72.0 TOBACCO USER: ICD-10-CM

## 2023-04-20 RX ORDER — SCOLOPAMINE TRANSDERMAL SYSTEM 1 MG/1
1 PATCH, EXTENDED RELEASE TRANSDERMAL
Qty: 4 PATCH | Refills: 1 | Status: SHIPPED | OUTPATIENT
Start: 2023-04-20

## 2023-04-20 RX ORDER — LANSOPRAZOLE 30 MG/1
30 CAPSULE, DELAYED RELEASE ORAL DAILY
Qty: 90 CAPSULE | Refills: 3 | Status: SHIPPED | OUTPATIENT
Start: 2023-04-20

## 2023-04-20 RX ORDER — LISINOPRIL 10 MG/1
10 TABLET ORAL DAILY
Qty: 90 TABLET | Refills: 1 | Status: SHIPPED | OUTPATIENT
Start: 2023-04-20

## 2023-04-20 RX ORDER — ATORVASTATIN CALCIUM 80 MG/1
80 TABLET, FILM COATED ORAL NIGHTLY
Qty: 90 TABLET | Refills: 3 | Status: SHIPPED | OUTPATIENT
Start: 2023-04-20

## 2023-04-20 NOTE — PROGRESS NOTES
Subjective   Anabella Baum is a 58 y.o. female.     History of Present Illness    Since the last visit, she has overall felt well.  She has GERD controlled on PPI Rx, Hyperlipidemia with goals met with current Rx, Vitamin D deficiency and will update labs for continued management and Primary hypertension restart lisinopril.  she has been out of lisinopril for months and blood pressure is elevated today.  Plan to restart lisinopril at 10 mg see her back in a few weeks for blood pressure check.  Labs are due again in August.  The patient denies medication side effects.  Will refill medications. LMP  (LMP Unknown)   She is taking B12 and D  Results for orders placed or performed during the hospital encounter of 11/15/22   ECG 12 Lead   Result Value Ref Range    QT Interval 351 ms     Lab Results   Component Value Date    CHLPL 132 01/13/2023    TRIG 107 01/13/2023    HDL 42 01/13/2023    LDL 70 01/13/2023     Lab Results   Component Value Date    GLUCOSE 99 01/13/2023    BUN 16 01/13/2023    CREATININE 0.62 01/13/2023    EGFRRESULT 103 01/13/2023    EGFR 103.4 11/15/2022    BCR 26 (H) 01/13/2023    K 4.4 01/13/2023    CO2 23 01/13/2023    CALCIUM 10.2 01/13/2023    PROTENTOTREF 7.2 01/13/2023    ALBUMIN 5.0 (H) 01/13/2023    BILITOT 0.6 01/13/2023    AST 22 01/13/2023    ALT 24 01/13/2023         Hx ankle fx right----had surgery-----Dr Lujan--following       I did have patient see cardiology and saw Dr. Wright 12/30/2020.  I asked her to see cardiology after noting coronary artery calcification on her low-dose lung CT scan due to her smoking history.  EKG was normal and he did do a stress test and this was negative normal and asked her to follow-up only as needed.     She did see GI---Dr Rivera did EGD---dilated her and no more dysphagia  Biopsies from the EGD show mild inflammation of the stomach body, normal midesophagus.  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  No dysplasia     She should continue the lansoprazole every day to prevent progression of the Pelayo's esophagus.   11-  Please place in 3-year EGD and colonoscopy recall, thanks     Office follow-up in 6 months, thanks   Has family Hashimoto's     Needs patch for cruise Motion sickness  The following portions of the patient's history were reviewed and updated as appropriate: allergies, current medications, past family history, past medical history, past social history, past surgical history and problem list.    Review of Systems   Constitutional: Negative for activity change, appetite change and unexpected weight change.   HENT: Negative for nosebleeds and trouble swallowing.    Eyes: Negative for pain and visual disturbance.   Respiratory: Negative for chest tightness, shortness of breath and wheezing.    Cardiovascular: Negative for chest pain and palpitations.   Gastrointestinal: Negative for abdominal pain and blood in stool.   Endocrine: Negative.    Genitourinary: Negative for difficulty urinating and hematuria.   Musculoskeletal: Positive for arthralgias and joint swelling.   Skin: Negative for color change and rash.   Allergic/Immunologic: Negative.    Neurological: Negative for syncope and speech difficulty.   Hematological: Negative for adenopathy.   Psychiatric/Behavioral: Negative for agitation and confusion.   All other systems reviewed and are negative.      Objective   Physical Exam  Vitals and nursing note reviewed.   Constitutional:       General: She is not in acute distress.     Appearance: She is well-developed. She is not ill-appearing or toxic-appearing.   HENT:      Head: Normocephalic.      Right Ear: External ear normal.      Left Ear: External ear normal.      Nose: Nose normal.      Mouth/Throat:      Pharynx: Oropharynx is clear.   Eyes:      General: No scleral icterus.     Conjunctiva/sclera: Conjunctivae normal.      Pupils: Pupils are equal, round, and  reactive to light.   Neck:      Thyroid: No thyromegaly.      Vascular: Carotid bruit present.   Cardiovascular:      Rate and Rhythm: Normal rate and regular rhythm.      Pulses: Normal pulses.      Heart sounds: Normal heart sounds. No murmur heard.  Pulmonary:      Effort: Pulmonary effort is normal. No respiratory distress.      Breath sounds: Normal breath sounds. No rales.   Abdominal:      Palpations: Abdomen is soft.      Tenderness: There is no abdominal tenderness.   Musculoskeletal:         General: No deformity. Normal range of motion.      Cervical back: Normal range of motion and neck supple.      Right lower leg: Edema present.   Skin:     General: Skin is warm and dry.      Findings: No rash.   Neurological:      General: No focal deficit present.      Mental Status: She is alert and oriented to person, place, and time. Mental status is at baseline.   Psychiatric:         Mood and Affect: Mood normal.         Behavior: Behavior normal.         Thought Content: Thought content normal.         Judgment: Judgment normal.         Assessment & Plan   Diagnoses and all orders for this visit:    1. Benign essential HTN (Primary)    2. Tobacco user  -      CT Chest Low Dose Cancer Screening WO    3. Vitamin D deficiency    4. Mixed hyperlipidemia    5. Gastroesophageal reflux disease without esophagitis    6. Post-menopausal  -     DEXA Bone Density Axial    7. Encounter for screening mammogram for breast cancer  -     Mammo Screening Digital Tomosynthesis Bilateral With CAD    Other orders  -     lisinopril (PRINIVIL,ZESTRIL) 10 MG tablet; Take 1 tablet by mouth Daily. for blood pressure  Dispense: 90 tablet; Refill: 1        Stop smoking  Plan, Anabella Baum, was seen today.  she was seen for HTN and will start medication, GERD and will continue on PPI medication, Hyperlipidemia and will continue current medication and Vitamin D deficiency and will update labs .  Low-dose CT of the chest to screen for lung  cancer  Screening mammogram and bone density ordered  Has follow-up with Ortho for ankle fracture right  Due for EGD Nov---had Pelayo's  Restart lisinopril for hypertension follow-up in a few weeks  Continue taking B12 and D labs in August  Sent Transderm Scop patch for travel motion sickness

## 2023-05-02 ENCOUNTER — TELEPHONE (OUTPATIENT)
Dept: OBSTETRICS AND GYNECOLOGY | Age: 58
End: 2023-05-02
Payer: COMMERCIAL

## 2023-05-02 NOTE — TELEPHONE ENCOUNTER
Former Dr. Cespedes pt last seen 11/08/2018 called c/o painful vaginal lump.  Pt scheduled first available JOSHUA oneal/SUSANNA Arellano to reestablish care for annual.  Pt asking if Dr. Cespedes can see her sooner for the vaginal lump.

## 2023-05-03 ENCOUNTER — OFFICE VISIT (OUTPATIENT)
Dept: FAMILY MEDICINE CLINIC | Facility: CLINIC | Age: 58
End: 2023-05-03
Payer: COMMERCIAL

## 2023-05-03 VITALS
SYSTOLIC BLOOD PRESSURE: 146 MMHG | RESPIRATION RATE: 16 BRPM | OXYGEN SATURATION: 99 % | HEIGHT: 63 IN | WEIGHT: 153 LBS | TEMPERATURE: 98.3 F | HEART RATE: 106 BPM | DIASTOLIC BLOOD PRESSURE: 82 MMHG | BODY MASS INDEX: 27.11 KG/M2

## 2023-05-03 DIAGNOSIS — B00.9 HSV-2 INFECTION: Primary | ICD-10-CM

## 2023-05-03 PROCEDURE — 99213 OFFICE O/P EST LOW 20 MIN: CPT | Performed by: PHYSICIAN ASSISTANT

## 2023-05-03 RX ORDER — VALACYCLOVIR HYDROCHLORIDE 1 G/1
1000 TABLET, FILM COATED ORAL 2 TIMES DAILY
Qty: 14 TABLET | Refills: 0 | Status: SHIPPED | OUTPATIENT
Start: 2023-05-03

## 2023-05-03 RX ORDER — VALACYCLOVIR HYDROCHLORIDE 500 MG/1
500 TABLET, FILM COATED ORAL 2 TIMES DAILY
Qty: 30 TABLET | Refills: 11 | Status: SHIPPED | OUTPATIENT
Start: 2023-05-03

## 2023-05-03 NOTE — PROGRESS NOTES
"Subjective   Anabella Baum is a 58 y.o. female.     History of Present Illness   Anabella Baum 58 y.o. female /82 (BP Location: Right arm, Patient Position: Sitting)   Pulse 106   Temp 98.3 °F (36.8 °C) (Oral)   Resp 16   Ht 160 cm (63\")   Wt 69.4 kg (153 lb)   LMP  (LMP Unknown)   SpO2 99%   BMI 27.10 kg/m²  who presents today for lump  In vaginal area---1 week.  Sore, no blood. Hurts to wipe; not at risk STD.      she has a history of   Patient Active Problem List   Diagnosis   • Allergic reaction   • Carotid bruit   • GERD (gastroesophageal reflux disease)   • HLD (hyperlipidemia)   • CTS (carpal tunnel syndrome)   • Rash, skin   • Vitamin D deficiency   • Dyshidrotic eczema   • Tobacco abuse   • Family history of breast cancer gene mutation in first degree relative   • Benign essential HTN   • Blood pressure elevated without history of HTN   • Stenosis of left carotid artery   • Esophageal dysphagia   • Gastroesophageal reflux disease   • Benign hemangioma   • Overweight with body mass index (BMI) 25.0-29.9   • Essential hypertension   • Tobacco user   .    Onset 1 week ago     ?had in past? Not sure    The following portions of the patient's history were reviewed and updated as appropriate: allergies, current medications, past family history, past medical history, past social history, past surgical history and problem list.    Review of Systems   Constitutional: Negative for fever.   HENT: Negative for nosebleeds and trouble swallowing.    Eyes: Negative for visual disturbance.   Respiratory: Negative for choking and stridor.    Cardiovascular: Negative for chest pain.   Gastrointestinal: Negative for blood in stool.   Endocrine: Negative for polydipsia.   Genitourinary: Negative for genital sores and hematuria.   Musculoskeletal: Negative for joint swelling.   Skin: Negative for color change and rash.   Allergic/Immunologic: Negative for immunocompromised state.   Neurological: Negative for " seizures, facial asymmetry and speech difficulty.   Hematological: Negative for adenopathy.   Psychiatric/Behavioral: Negative for behavioral problems, self-injury and suicidal ideas.       Objective   Physical Exam  Constitutional:       General: She is not in acute distress.     Appearance: She is well-developed. She is not diaphoretic.   HENT:      Head: Normocephalic and atraumatic.   Eyes:      Conjunctiva/sclera: Conjunctivae normal.   Pulmonary:      Effort: Pulmonary effort is normal. No respiratory distress.      Breath sounds: No rales.   Genitourinary:     Labia:         Right: Rash, tenderness and lesion present.         Left: No rash, tenderness or lesion.       Comments: HSV cluster blisters ---papules, scabs----- right labia majora  Musculoskeletal:         General: Normal range of motion.      Cervical back: Normal range of motion.   Lymphadenopathy:      Lower Body: No right inguinal adenopathy. No left inguinal adenopathy.   Skin:     General: Skin is dry.      Findings: Lesion present.   Neurological:      Mental Status: She is alert and oriented to person, place, and time.      Coordination: Coordination normal.   Psychiatric:         Behavior: Behavior normal.         Thought Content: Thought content normal.         Judgment: Judgment normal.         Assessment & Plan   Diagnoses and all orders for this visit:    1. HSV-2 infection (Primary)    Other orders  -     valACYclovir (Valtrex) 1000 MG tablet; Take 1 tablet by mouth 2 (Two) Times a Day.  Dispense: 14 tablet; Refill: 0  -     valACYclovir (Valtrex) 500 MG tablet; Take 1 tablet by mouth 2 (Two) Times a Day. X 3 days PRN for rash  Dispense: 30 tablet; Refill: 11    HSV----100mg BID X 7 days   For episodic 500mg BID X 3 days PRN

## 2023-05-24 ENCOUNTER — TELEPHONE (OUTPATIENT)
Dept: FAMILY MEDICINE CLINIC | Facility: CLINIC | Age: 58
End: 2023-05-24

## 2023-05-24 ENCOUNTER — OFFICE VISIT (OUTPATIENT)
Dept: FAMILY MEDICINE CLINIC | Facility: CLINIC | Age: 58
End: 2023-05-24
Payer: COMMERCIAL

## 2023-05-24 VITALS
DIASTOLIC BLOOD PRESSURE: 70 MMHG | SYSTOLIC BLOOD PRESSURE: 119 MMHG | WEIGHT: 151.8 LBS | BODY MASS INDEX: 25.92 KG/M2 | HEART RATE: 111 BPM | TEMPERATURE: 98.2 F | OXYGEN SATURATION: 97 % | RESPIRATION RATE: 16 BRPM | HEIGHT: 64 IN

## 2023-05-24 DIAGNOSIS — I10 BENIGN ESSENTIAL HTN: ICD-10-CM

## 2023-05-24 DIAGNOSIS — J06.9 ACUTE URI: Primary | ICD-10-CM

## 2023-05-24 PROCEDURE — 99214 OFFICE O/P EST MOD 30 MIN: CPT | Performed by: PHYSICIAN ASSISTANT

## 2023-05-24 RX ORDER — METHYLPREDNISOLONE 4 MG/1
TABLET ORAL
Qty: 21 TABLET | Refills: 0 | Status: SHIPPED | OUTPATIENT
Start: 2023-05-24

## 2023-05-24 RX ORDER — BENZONATATE 200 MG/1
200 CAPSULE ORAL 3 TIMES DAILY PRN
Qty: 30 CAPSULE | Refills: 0 | Status: SHIPPED | OUTPATIENT
Start: 2023-05-24

## 2023-05-24 RX ORDER — AZITHROMYCIN 250 MG/1
TABLET, FILM COATED ORAL
Qty: 6 TABLET | Refills: 1 | Status: SHIPPED | OUTPATIENT
Start: 2023-05-24

## 2023-05-24 NOTE — TELEPHONE ENCOUNTER
She can work from home but I would have her off work until Monday or Tuesday due to yes she is contagious from a virus

## 2023-05-24 NOTE — TELEPHONE ENCOUNTER
Caller: Anabella Baum    Relationship: Self    Best call back number: [phone]    What is the best time to reach you: ANYTIME    Who are you requesting to speak with (clinical staff, provider,  specific staff member): CLINICAL STAFF    Do you know the name of the person who called: SELF    What was the call regarding: THE PATIENT WOULD LIKE TO KNOW HOW LONG IT IS RECOMMENDED FOR HER TO BE OFF FROM WORK AND HOW LONG SHE WILL BE CONTAGIOUS.     Do you require a callback: YES

## 2023-05-24 NOTE — PROGRESS NOTES
"Subjective   Anabella Baum is a 58 y.o. female.     History of Present Illness    Since the last visit, she has overall felt well until recent illness.  She has Mixed hyperlipidemia at goal on last labs, GERD controlled on PPI, primary hypertension and today is follow-up from 4/20/2023 visit restarted lisinopril for treatment and need to check blood pressure today.  No ACE cough.  she has been compliant with current medications have reviewed them.  The patient denies medication side effects.  Will refill medications. /85 (BP Location: Left arm, Patient Position: Sitting)   Pulse 111   Temp 98.2 °F (36.8 °C) (Oral)   Resp 16   Ht 162.6 cm (64\")   Wt 68.9 kg (151 lb 12.8 oz)   LMP  (LMP Unknown)   SpO2 97%   BMI 26.06 kg/m²   Taking D and B12    Results for orders placed or performed during the hospital encounter of 11/15/22   ECG 12 Lead   Result Value Ref Range    QT Interval 351 ms     Lab Results   Component Value Date    GLUCOSE 99 01/13/2023    BUN 16 01/13/2023    CREATININE 0.62 01/13/2023    EGFRRESULT 103 01/13/2023    EGFR 103.4 11/15/2022    BCR 26 (H) 01/13/2023    K 4.4 01/13/2023    CO2 23 01/13/2023    CALCIUM 10.2 01/13/2023    PROTENTOTREF 7.2 01/13/2023    ALBUMIN 5.0 (H) 01/13/2023    BILITOT 0.6 01/13/2023    AST 22 01/13/2023    ALT 24 01/13/2023     Lab Results   Component Value Date    CHLPL 132 01/13/2023    TRIG 107 01/13/2023    HDL 42 01/13/2023    LDL 70 01/13/2023     Lab Results   Component Value Date    NRLKNCUI33 949 01/12/2022     History of bruit left carotid artery last carotid Doppler of was 2/19/2020 noting mild stenosis left plaque on right I do have an order to follow-up and she has an appointment.  I did have patient see cardiology and saw Dr. Wright 12/30/2020.  I asked her to see cardiology after noting coronary artery calcification on her low-dose lung CT scan due to her smoking history.  EKG was normal and he did do a stress test and this was negative normal " and asked her to follow-up only as needed.     She did see GI---Dr Rivera did EGD---dilated her and no more dysphagia  Biopsies from the EGD show mild inflammation of the stomach body, normal midesophagus.  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  No dysplasia     She should continue the lansoprazole every day to prevent progression of the Pelayo's esophagus.   11-  Please place in 3-year EGD and colonoscopy recall, thanks  Anabella Baum 58 y.o. female who presents for evaluation of sinus pressure and congestion, sore throat, cough, fatigue. Symptoms include ear pressure, congestion, sore throat, swollen glands, nasal blockage, post nasal drip, productive cough, headache, fatigue and diarrhea.  Onset of symptoms was 3 days ago, gradually worsening since that time. Patient denies fever.   Evaluation to date: none Treatment to date:  OTC antihistamines and cough drops.       The following portions of the patient's history were reviewed and updated as appropriate: allergies, current medications, past family history, past medical history, past social history, past surgical history and problem list.    Review of Systems   Constitutional: Positive for activity change, appetite change and fatigue. Negative for fever and unexpected weight change.   HENT: Positive for congestion, postnasal drip, sinus pressure, sinus pain and sore throat. Negative for nosebleeds and trouble swallowing.    Eyes: Negative for pain and visual disturbance.   Respiratory: Positive for cough. Negative for chest tightness, shortness of breath and wheezing.    Cardiovascular: Negative for chest pain and palpitations.   Gastrointestinal: Positive for diarrhea. Negative for abdominal pain and blood in stool.   Endocrine: Negative.    Genitourinary: Negative for difficulty urinating and hematuria.   Musculoskeletal: Negative for joint swelling.   Skin: Negative for color change and  rash.   Allergic/Immunologic: Negative.    Neurological: Negative for syncope and speech difficulty.   Hematological: Negative for adenopathy.   Psychiatric/Behavioral: Negative for agitation and confusion.   All other systems reviewed and are negative.      Objective   Physical Exam  Vitals and nursing note reviewed.   Constitutional:       General: She is not in acute distress.     Appearance: She is well-developed. She is not toxic-appearing or diaphoretic.   HENT:      Head: Normocephalic and atraumatic. Hair is normal.      Right Ear: External ear normal. No drainage, swelling or tenderness. Tympanic membrane is retracted.      Left Ear: External ear normal. No drainage, swelling or tenderness. Tympanic membrane is retracted.      Nose: Mucosal edema present.      Mouth/Throat:      Mouth: No oral lesions.      Pharynx: Uvula midline. Posterior oropharyngeal erythema present. No oropharyngeal exudate or uvula swelling.   Eyes:      General: No scleral icterus.        Right eye: No discharge.         Left eye: No discharge.      Conjunctiva/sclera: Conjunctivae normal.      Pupils: Pupils are equal, round, and reactive to light.   Cardiovascular:      Rate and Rhythm: Normal rate and regular rhythm.      Heart sounds: Normal heart sounds. No murmur heard.    No gallop.   Pulmonary:      Effort: No respiratory distress.      Breath sounds: Normal breath sounds. No stridor. No wheezing or rales.   Chest:      Chest wall: No tenderness.   Abdominal:      Palpations: Abdomen is soft.      Tenderness: There is no abdominal tenderness.   Musculoskeletal:      Cervical back: Normal range of motion and neck supple.   Lymphadenopathy:      Cervical: Cervical adenopathy present.   Skin:     General: Skin is warm and dry.      Findings: No rash.   Neurological:      Mental Status: She is alert and oriented to person, place, and time.      Motor: No abnormal muscle tone.   Psychiatric:         Behavior: Behavior normal.          Thought Content: Thought content normal.         Judgment: Judgment normal.         Assessment & Plan   Diagnoses and all orders for this visit:    1. Acute URI (Primary)    2. Benign essential HTN      Stop smoking  Cont statin  Prednisone pack for acute upper respiratory virus and only fill antibiotic if secondary infection and worse  Still needs the carotid Doppler and has appointment  Plan, Anabella Baum, was seen today.  she was seen for HTN and continue medication, GERD and will continue on PPI medication and Hyperlipidemia and will continue current medication.

## 2023-05-25 ENCOUNTER — TELEPHONE (OUTPATIENT)
Dept: FAMILY MEDICINE CLINIC | Facility: CLINIC | Age: 58
End: 2023-05-25
Payer: COMMERCIAL

## 2023-05-25 NOTE — TELEPHONE ENCOUNTER
Caller: Anabella Baum    Relationship: Self    Best call back number:[phone]    What form or medical record are you requesting: DOCTOR'S NOTE    Who is requesting this form or medical record from you: EMPLOYER    How would you like to receive the form or medical records (pick-up, mail, fax): E-MAIL    If E-mail, what is the E-mail address: [email or web address]    Timeframe paperwork needed: ASAP    Additional notes:PATIENT STATES THAT HER JOB IS REQUIRING A DOCTOR NOTE WHEN SHE IS OFF FOR 3 DAYS OR MORE SINCE AARON BAUM STATED SHE SHOULD BE OFF UNTIL Monday 5/29/23

## 2023-05-26 ENCOUNTER — TELEPHONE (OUTPATIENT)
Dept: FAMILY MEDICINE CLINIC | Facility: CLINIC | Age: 58
End: 2023-05-26
Payer: COMMERCIAL

## 2023-05-26 NOTE — LETTER
May 26, 2023     Patient: Anabella Baum   YOB: 1965   Date of Visit: 5/26/2023       To Whom It May Concern:    Anabella Baum was seen in my office on 5/24/2023 and it is my medical opinion that she may return to work on 5/29/2023.       Sincerely,        Manuela Baum PA-C    CC: No Recipients

## 2023-06-16 ENCOUNTER — OFFICE VISIT (OUTPATIENT)
Dept: FAMILY MEDICINE CLINIC | Facility: CLINIC | Age: 58
End: 2023-06-16
Payer: COMMERCIAL

## 2023-06-16 VITALS
OXYGEN SATURATION: 97 % | HEIGHT: 64 IN | BODY MASS INDEX: 26.09 KG/M2 | SYSTOLIC BLOOD PRESSURE: 120 MMHG | DIASTOLIC BLOOD PRESSURE: 78 MMHG | HEART RATE: 101 BPM | WEIGHT: 152.8 LBS | RESPIRATION RATE: 16 BRPM | TEMPERATURE: 96.3 F

## 2023-06-16 DIAGNOSIS — Z20.818 EXPOSURE TO STREP THROAT: ICD-10-CM

## 2023-06-16 DIAGNOSIS — J02.9 SORE THROAT: Primary | ICD-10-CM

## 2023-06-16 LAB
EXPIRATION DATE: NORMAL
INTERNAL CONTROL: NORMAL
Lab: NORMAL
S PYO AG THROAT QL: NEGATIVE

## 2023-06-16 PROCEDURE — 87880 STREP A ASSAY W/OPTIC: CPT | Performed by: NURSE PRACTITIONER

## 2023-06-16 PROCEDURE — 99213 OFFICE O/P EST LOW 20 MIN: CPT | Performed by: NURSE PRACTITIONER

## 2023-06-16 RX ORDER — AZITHROMYCIN 250 MG/1
TABLET, FILM COATED ORAL
Qty: 6 TABLET | Refills: 0 | Status: SHIPPED | OUTPATIENT
Start: 2023-06-16

## 2023-06-16 NOTE — PROGRESS NOTES
Subjective   Anabella Baum is a 58 y.o. female.     History of Present Illness   Sore Throat (Daughter has strep)  Headache.  Symptoms started last night and have progressively worsened.  She still has cough from recent URI.    The following portions of the patient's history were reviewed and updated as appropriate: allergies, current medications, past family history, past medical history, past social history, past surgical history, and problem list.    Review of Systems   Constitutional:  Positive for fatigue.   HENT:  Positive for sore throat and trouble swallowing. Negative for congestion.    Respiratory:  Positive for cough. Negative for shortness of breath.    Cardiovascular:  Negative for chest pain and palpitations.   Neurological:  Positive for headaches.   Psychiatric/Behavioral:  Negative for behavioral problems.      Objective   Physical Exam  Vitals and nursing note reviewed.   Constitutional:       Appearance: Normal appearance. She is well-developed.   HENT:      Mouth/Throat:      Lips: Pink.      Mouth: Mucous membranes are moist.      Pharynx: Posterior oropharyngeal erythema present. No pharyngeal swelling, oropharyngeal exudate or uvula swelling.      Tonsils: No tonsillar exudate.   Cardiovascular:      Rate and Rhythm: Normal rate and regular rhythm.   Pulmonary:      Effort: Pulmonary effort is normal.      Breath sounds: Normal breath sounds.   Neurological:      Mental Status: She is alert and oriented to person, place, and time.   Psychiatric:         Mood and Affect: Mood normal.         Behavior: Behavior normal.         Thought Content: Thought content normal.         Judgment: Judgment normal.       Assessment & Plan   Diagnoses and all orders for this visit:    1. Sore throat (Primary)  -     POCT rapid strep A    2. Exposure to strep throat  -     POCT rapid strep A    Other orders  -     azithromycin (Zithromax Z-Terry) 250 MG tablet; Take 2 tablets by mouth on day 1, then 1 tablet  daily on days 2-5  Dispense: 6 tablet; Refill: 0

## 2023-06-22 LAB
BH CV XLRA MEAS LEFT DIST CCA EDV: 21.6 CM/SEC
BH CV XLRA MEAS LEFT DIST CCA PSV: 70.7 CM/SEC
BH CV XLRA MEAS LEFT DIST ICA EDV: -39.3 CM/SEC
BH CV XLRA MEAS LEFT DIST ICA PSV: -93.3 CM/SEC
BH CV XLRA MEAS LEFT ICA/CCA RATIO: 1.32
BH CV XLRA MEAS LEFT MID ICA EDV: -34.6 CM/SEC
BH CV XLRA MEAS LEFT MID ICA PSV: -79.2 CM/SEC
BH CV XLRA MEAS LEFT PROX CCA EDV: 21.1 CM/SEC
BH CV XLRA MEAS LEFT PROX CCA PSV: 103 CM/SEC
BH CV XLRA MEAS LEFT PROX ECA EDV: -9 CM/SEC
BH CV XLRA MEAS LEFT PROX ECA PSV: -80.8 CM/SEC
BH CV XLRA MEAS LEFT PROX ICA EDV: -29.9 CM/SEC
BH CV XLRA MEAS LEFT PROX ICA PSV: -72.3 CM/SEC
BH CV XLRA MEAS LEFT PROX SCLA PSV: 137 CM/SEC
BH CV XLRA MEAS LEFT VERTEBRAL A EDV: 11.8 CM/SEC
BH CV XLRA MEAS LEFT VERTEBRAL A PSV: 60.9 CM/SEC
BH CV XLRA MEAS RIGHT DIST CCA EDV: -19.6 CM/SEC
BH CV XLRA MEAS RIGHT DIST CCA PSV: -75.4 CM/SEC
BH CV XLRA MEAS RIGHT DIST ICA EDV: -30.5 CM/SEC
BH CV XLRA MEAS RIGHT DIST ICA PSV: -79.7 CM/SEC
BH CV XLRA MEAS RIGHT ICA/CCA RATIO: 1.1
BH CV XLRA MEAS RIGHT MID ICA EDV: 32.8 CM/SEC
BH CV XLRA MEAS RIGHT MID ICA PSV: 82.7 CM/SEC
BH CV XLRA MEAS RIGHT PROX CCA EDV: -19.3 CM/SEC
BH CV XLRA MEAS RIGHT PROX CCA PSV: -91.5 CM/SEC
BH CV XLRA MEAS RIGHT PROX ECA EDV: -10.6 CM/SEC
BH CV XLRA MEAS RIGHT PROX ECA PSV: -87.4 CM/SEC
BH CV XLRA MEAS RIGHT PROX ICA EDV: -19.3 CM/SEC
BH CV XLRA MEAS RIGHT PROX ICA PSV: -61.3 CM/SEC
BH CV XLRA MEAS RIGHT PROX SCLA PSV: 112 CM/SEC
BH CV XLRA MEAS RIGHT VERTEBRAL A EDV: 30.6 CM/SEC
BH CV XLRA MEAS RIGHT VERTEBRAL A PSV: 104 CM/SEC
LEFT ARM BP: NORMAL MMHG
RIGHT ARM BP: NORMAL MMHG

## 2023-09-28 ENCOUNTER — OFFICE VISIT (OUTPATIENT)
Dept: OBSTETRICS AND GYNECOLOGY | Age: 58
End: 2023-09-28
Payer: COMMERCIAL

## 2023-09-28 VITALS
SYSTOLIC BLOOD PRESSURE: 120 MMHG | DIASTOLIC BLOOD PRESSURE: 70 MMHG | BODY MASS INDEX: 25.68 KG/M2 | WEIGHT: 150.4 LBS | HEIGHT: 64 IN

## 2023-09-28 DIAGNOSIS — Z84.81 FAMILY HISTORY OF BREAST CANCER GENE MUTATION IN FIRST DEGREE RELATIVE: ICD-10-CM

## 2023-09-28 DIAGNOSIS — Z01.419 WELL WOMAN EXAM WITH ROUTINE GYNECOLOGICAL EXAM: Primary | ICD-10-CM

## 2023-09-28 DIAGNOSIS — Z12.4 SCREENING FOR CERVICAL CANCER: ICD-10-CM

## 2023-09-28 DIAGNOSIS — Z12.11 SCREEN FOR COLON CANCER: ICD-10-CM

## 2023-09-28 DIAGNOSIS — Z72.0 TOBACCO USER: ICD-10-CM

## 2023-09-28 NOTE — PROGRESS NOTES
Subjective     Chief Complaint   Patient presents with    Gynecologic Exam     New Gyn, last pap 2018 neg/hpv neg, mg 2023, dexa 2018, csy 2015  Cc:  no problems       History of Present Illness    Anabella Baum is a 58 y.o.  who presents for annual exam.    New GYN  Previously saw Dr Cespedes as a new patient back in 2018  That a significant history of breast cancer in her sister and maternal aunt.  They have both been tested for genetic causes and were negative.  PM age 49. No vaginal bleeding, HF/NS, or pelvic pain  Had a herpes outbreak a couple of months ago, had never had this prior. Her and  both have cold sores but never a genital outbreak. Has not had anything since.   Colonoscopy is due   Dexa is scheduled for   Mammo [facility]    Obstetric History:  OB History          1    Para   1    Term   1            AB        Living   4         SAB        IAB        Ectopic        Molar        Multiple   1    Live Births   4               Menstrual History:     No LMP recorded (lmp unknown). Patient is postmenopausal.         Current contraception: post menopausal status  History of abnormal Pap smear: no  Received Gardasil immunization: no  Perform regular self breast exam: yes - .  Family history of uterine or ovarian cancer: no  Family History of colon cancer: yes - cousin  Family history of breast cancer: yes - sister, maternal aunt    Mammogram: up to date.  Colonoscopy: up to date.  DEXA: ordered.    Exercise: moderately active  Calcium/Vitamin D: uses supplements    The following portions of the patient's history were reviewed and updated as appropriate: allergies, current medications, past family history, past medical history, past social history, past surgical history, and problem list.    Review of Systems   Constitutional: Negative.    Respiratory: Negative.     Cardiovascular: Negative.    Gastrointestinal: Negative.    Genitourinary: Negative.     Skin: Negative.    Psychiatric/Behavioral: Negative.           Objective   Physical Exam  Constitutional:       General: She is awake.      Appearance: Normal appearance. She is well-developed.   HENT:      Head: Normocephalic and atraumatic.      Nose: Nose normal.   Neck:      Thyroid: No thyroid mass, thyromegaly or thyroid tenderness.   Cardiovascular:      Rate and Rhythm: Normal rate and regular rhythm.      Pulses: Normal pulses.      Heart sounds: Normal heart sounds.   Pulmonary:      Effort: Pulmonary effort is normal.      Breath sounds: Normal breath sounds.   Chest:   Breasts:     Breasts are symmetrical.      Right: Normal. No swelling, bleeding, inverted nipple, mass, nipple discharge, skin change or tenderness.      Left: Normal. No swelling, bleeding, inverted nipple, mass, nipple discharge, skin change or tenderness.   Abdominal:      General: Abdomen is flat. Bowel sounds are normal.      Palpations: Abdomen is soft.      Tenderness: There is no abdominal tenderness.   Genitourinary:     General: Normal vulva.      Labia:         Right: No rash, tenderness, lesion or injury.         Left: No rash, tenderness, lesion or injury.       Urethra: No prolapse, urethral pain, urethral swelling or urethral lesion.      Vagina: Normal. No signs of injury. No vaginal discharge, erythema, tenderness, bleeding, lesions or prolapsed vaginal walls.      Cervix: Normal. No discharge, friability, lesion, erythema or cervical bleeding.      Uterus: Normal. Not enlarged, not tender and no uterine prolapse.       Adnexa: Right adnexa normal and left adnexa normal.        Right: No mass, tenderness or fullness.          Left: No mass, tenderness or fullness.        Rectum: Normal. No mass.   Musculoskeletal:      Cervical back: Normal range of motion and neck supple.   Lymphadenopathy:      Upper Body:      Right upper body: No supraclavicular adenopathy.      Left upper body: No supraclavicular adenopathy.  "  Skin:     General: Skin is warm and dry.   Neurological:      General: No focal deficit present.      Mental Status: She is alert and oriented to person, place, and time.   Psychiatric:         Mood and Affect: Mood normal.         Behavior: Behavior normal. Behavior is cooperative.         Thought Content: Thought content normal.         Judgment: Judgment normal.       /70   Ht 162.6 cm (64\")   Wt 68.2 kg (150 lb 6.4 oz)   LMP  (LMP Unknown)   BMI 25.82 kg/m²     Assessment & Plan   Diagnoses and all orders for this visit:    1. Well woman exam with routine gynecological exam (Primary)    2. Family history of breast cancer gene mutation in first degree relative    3. Tobacco user    4. Screening for cervical cancer  -     IGP, Apt HPV,rfx 16 / 18,45    5. Screen for colon cancer  -     Ambulatory Referral For Screening Colonoscopy        All questions answered.  Breast self exam technique reviewed and patient encouraged to perform self-exam monthly.  Discussed healthy lifestyle modifications.  Recommended 30 minutes of aerobic exercise five times per week.  Discussed calcium needs to prevent osteoporosis.    -Fu 1 year               "

## 2023-10-02 LAB
CYTOLOGIST CVX/VAG CYTO: NORMAL
CYTOLOGY CVX/VAG DOC CYTO: NORMAL
CYTOLOGY CVX/VAG DOC THIN PREP: NORMAL
DX ICD CODE: NORMAL
HIV 1 & 2 AB SER-IMP: NORMAL
HPV I/H RISK 4 DNA CVX QL PROBE+SIG AMP: NEGATIVE
OTHER STN SPEC: NORMAL
STAT OF ADQ CVX/VAG CYTO-IMP: NORMAL

## 2023-10-17 RX ORDER — LISINOPRIL 10 MG/1
10 TABLET ORAL DAILY
Qty: 90 TABLET | Refills: 0 | Status: SHIPPED | OUTPATIENT
Start: 2023-10-17

## 2023-11-03 ENCOUNTER — HOSPITAL ENCOUNTER (OUTPATIENT)
Dept: BONE DENSITY | Facility: HOSPITAL | Age: 58
Discharge: HOME | End: 2023-11-03
Admitting: PHYSICIAN ASSISTANT
Payer: COMMERCIAL

## 2023-11-03 PROCEDURE — 77080 DXA BONE DENSITY AXIAL: CPT

## 2023-12-06 RX ORDER — LISINOPRIL 10 MG/1
10 TABLET ORAL DAILY
Qty: 90 TABLET | Refills: 0 | Status: SHIPPED | OUTPATIENT
Start: 2023-12-06 | End: 2023-12-06 | Stop reason: SDUPTHER

## 2023-12-06 RX ORDER — LANSOPRAZOLE 30 MG/1
30 CAPSULE, DELAYED RELEASE ORAL DAILY
Qty: 90 CAPSULE | Refills: 3 | Status: SHIPPED | OUTPATIENT
Start: 2023-12-06

## 2023-12-06 RX ORDER — LISINOPRIL 10 MG/1
10 TABLET ORAL DAILY
Qty: 90 TABLET | Refills: 0 | Status: SHIPPED | OUTPATIENT
Start: 2023-12-06

## 2023-12-06 NOTE — TELEPHONE ENCOUNTER
Caller: Bautista Anabella RENITA    Relationship: Self    Best call back number: [phone]     Requested Prescriptions:   Requested Prescriptions     Pending Prescriptions Disp Refills    lisinopril (PRINIVIL,ZESTRIL) 10 MG tablet 90 tablet 0     Sig: Take 1 tablet by mouth Daily. for blood pressure    lansoprazole (PREVACID) 30 MG capsule 90 capsule 3     Sig: Take 1 capsule by mouth Daily. For GERD with Pelayo's esophagus        Pharmacy where request should be sent: [facility] - [address] [phone] [facility] [phone] [place]     Last office visit with prescribing clinician: 5/24/2023   Last telemedicine visit with prescribing clinician: Visit date not found   Next office visit with prescribing clinician: Visit date not found     Additional details provided by patient: PATIENT STATES SHE IS COMPLETELY OUT OF THE LISINOPRIL AND WILL BE OUT OF THE LANSOPRAZOLE BEFORE [facility] SENDS THE PRESCRIPTIONS. PATIENT IS REQUESTING FOR A PRESCRIPTION, FOR BOTH MEDICATIONS TO BE SENT TO [facility] ([id number] [address] AT [facility] [address] & [facility] [phone] [facility] [phone] FX ) , ENOUGH TO LAST UNTIL SHE RECEIVES IT.       Does the patient have less than a 3 day supply:  [x] Yes  [] No    Would you like a call back once the refill request has been completed: [] Yes [x] No    If the office needs to give you a call back, can they leave a voicemail: [] Yes [x] No    Hansa Rivera Rep   12/06/23 10:36 EST

## 2023-12-18 NOTE — TELEPHONE ENCOUNTER
I return this pt call back, she has appointment with Renita Lin on 12/27 at 10:30 am. She been out of her medication lisinopril (PRINIVIL,ZESTRIL) 10 MG tablet [32006] for 3 months. Would like to know if she could have a enough medication until her appointment.          Please advise

## 2023-12-19 RX ORDER — LISINOPRIL 10 MG/1
10 TABLET ORAL DAILY
Qty: 30 TABLET | Refills: 0 | Status: SHIPPED | OUTPATIENT
Start: 2023-12-19

## 2023-12-19 NOTE — TELEPHONE ENCOUNTER
Patient last seen  4/20/2023 by tammie paz. Pt has a scheduled appointment with jermain on  12/28/2023  sent to tammie paz for approval

## 2023-12-28 ENCOUNTER — OFFICE VISIT (OUTPATIENT)
Dept: FAMILY MEDICINE CLINIC | Facility: CLINIC | Age: 58
End: 2023-12-28
Payer: COMMERCIAL

## 2023-12-28 VITALS
OXYGEN SATURATION: 98 % | WEIGHT: 156 LBS | TEMPERATURE: 98 F | BODY MASS INDEX: 26.63 KG/M2 | SYSTOLIC BLOOD PRESSURE: 124 MMHG | HEART RATE: 97 BPM | HEIGHT: 64 IN | DIASTOLIC BLOOD PRESSURE: 82 MMHG

## 2023-12-28 DIAGNOSIS — E55.9 VITAMIN D DEFICIENCY: ICD-10-CM

## 2023-12-28 DIAGNOSIS — I10 BENIGN ESSENTIAL HTN: Primary | ICD-10-CM

## 2023-12-28 PROCEDURE — 99213 OFFICE O/P EST LOW 20 MIN: CPT

## 2023-12-28 RX ORDER — CHOLECALCIFEROL (VITAMIN D3) 50 MCG
TABLET ORAL
Qty: 90 TABLET | Refills: 1 | Status: SHIPPED | OUTPATIENT
Start: 2023-12-28

## 2023-12-28 RX ORDER — LISINOPRIL 10 MG/1
10 TABLET ORAL DAILY
Qty: 90 TABLET | Refills: 1 | Status: SHIPPED | OUTPATIENT
Start: 2023-12-28

## 2023-12-28 NOTE — PROGRESS NOTES
"Chief Complaint  Hypertension and Med Refill (Patient is here for med refill and follow up on hypertension. )    Subjective         Hypertension      Since the last visit, she has overall felt well.  She has Primary Hypertension and well controlled on current medication and Vitamin D deficiency and labs are at goal >30 ng/mL.  She has been compliant with current medications, and I have reviewed them.  The patient denies medication side effects.  Will refill medications.  She is asymptomatic today.          Objective   Vital Signs:   /82   Pulse 97   Temp 98 °F (36.7 °C)   Ht 162.6 cm (64.02\")   Wt 70.8 kg (156 lb)   SpO2 98%   BMI 26.76 kg/m²      BMI is >= 25 and <30. (Overweight) The following options were offered after discussion;: exercise counseling/recommendations and nutrition counseling/recommendations        Physical Exam  Vitals and nursing note reviewed.   Constitutional:       Appearance: She is well-developed and overweight. She is not toxic-appearing.   HENT:      Head: Normocephalic.   Eyes:      General: No scleral icterus.     Pupils: Pupils are equal, round, and reactive to light.   Neck:      Thyroid: No thyromegaly.      Vascular: No carotid bruit.   Cardiovascular:      Rate and Rhythm: Normal rate and regular rhythm.      Pulses: Normal pulses.      Heart sounds: Normal heart sounds.   Pulmonary:      Effort: Pulmonary effort is normal. No respiratory distress.      Breath sounds: Normal breath sounds. No stridor.   Musculoskeletal:         General: No deformity.   Skin:     General: Skin is warm.      Coloration: Skin is not jaundiced.   Neurological:      General: No focal deficit present.      Mental Status: She is alert and oriented to person, place, and time.   Psychiatric:         Behavior: Behavior normal.         Thought Content: Thought content normal.         Judgment: Judgment normal.          The following data was reviewed by: SUSANNA Mak on 12/28/2023:  Vitamin " D 25 Hydroxy (01/12/2022 09:06)              Assessment and Plan      Diagnoses and all orders for this visit:    1. Benign essential HTN (Primary)  Comments:  Well-controlled, asymptomatic  Continue Lisinopril daily  DASH diet, exercise, weight loss  Follow-up with PCP in 6 months  Orders:  -     lisinopril (PRINIVIL,ZESTRIL) 10 MG tablet; Take 1 tablet by mouth Daily. for blood pressure  Dispense: 90 tablet; Refill: 1    2. Vitamin D deficiency  Comments:  Continue vitamin D daily  Follow-up with PCP in 6 months  Orders:  -     Cholecalciferol (Vitamin D) 50 MCG (2000 UT) tablet; Take one tablet by mouth once daily.  Dispense: 90 tablet; Refill: 1            Follow Up     Return in about 6 months (around 6/28/2024) for Next scheduled follow up.    Patient was given instructions and counseling regarding her condition or for health maintenance advice. Please see specific information pulled into the AVS if appropriate.

## 2024-04-05 RX ORDER — MAGNESIUM 200 MG
TABLET ORAL
Qty: 90 TABLET | Refills: 3 | Status: SHIPPED | OUTPATIENT
Start: 2024-04-05

## 2024-06-07 ENCOUNTER — TELEPHONE (OUTPATIENT)
Dept: FAMILY MEDICINE CLINIC | Facility: CLINIC | Age: 59
End: 2024-06-07
Payer: COMMERCIAL

## 2024-06-07 NOTE — TELEPHONE ENCOUNTER
Spoke with patient to inform her that our office does not have any same day appointments available for today and advised her to go to [facility] for an evaluation. Patient voiced understanding.

## 2024-06-07 NOTE — TELEPHONE ENCOUNTER
Caller: Anabella Baum    Relationship to patient: Self    Best call back number: [phone]     Chief complaint: LEFT THUMB NAIL SPLIT- POSSIBLE FUNGAL INFECTION    Type of visit: OFFICE    Requested date: AS SOON AS POSSIBLE     If rescheduling, when is the original appointment:      Additional notes:

## 2024-06-17 RX ORDER — ATORVASTATIN CALCIUM 80 MG/1
TABLET, FILM COATED ORAL
Qty: 90 TABLET | Refills: 3 | OUTPATIENT
Start: 2024-06-17

## 2024-07-02 DIAGNOSIS — I10 BENIGN ESSENTIAL HTN: ICD-10-CM

## 2024-07-02 DIAGNOSIS — E78.2 MIXED HYPERLIPIDEMIA: ICD-10-CM

## 2024-07-02 DIAGNOSIS — K21.9 GASTROESOPHAGEAL REFLUX DISEASE WITHOUT ESOPHAGITIS: Primary | ICD-10-CM

## 2024-07-02 DIAGNOSIS — E55.9 VITAMIN D DEFICIENCY: ICD-10-CM

## 2024-07-13 LAB
25(OH)D3+25(OH)D2 SERPL-MCNC: 46.3 NG/ML (ref 30–100)
ALBUMIN SERPL-MCNC: 4.6 G/DL (ref 3.8–4.9)
ALP SERPL-CCNC: 148 IU/L (ref 44–121)
ALT SERPL-CCNC: 20 IU/L (ref 0–32)
APPEARANCE UR: ABNORMAL
AST SERPL-CCNC: 18 IU/L (ref 0–40)
BACTERIA #/AREA URNS HPF: ABNORMAL /[HPF]
BASOPHILS # BLD AUTO: 0 X10E3/UL (ref 0–0.2)
BASOPHILS NFR BLD AUTO: 0 %
BILIRUB SERPL-MCNC: 0.6 MG/DL (ref 0–1.2)
BILIRUB UR QL STRIP: NEGATIVE
BUN SERPL-MCNC: 15 MG/DL (ref 8–27)
BUN/CREAT SERPL: 23 (ref 12–28)
CALCIUM SERPL-MCNC: 9.9 MG/DL (ref 8.7–10.3)
CASTS URNS MICRO: ABNORMAL
CASTS URNS QL MICRO: PRESENT /LPF
CHLORIDE SERPL-SCNC: 100 MMOL/L (ref 96–106)
CHOLEST SERPL-MCNC: 133 MG/DL (ref 100–199)
CO2 SERPL-SCNC: 23 MMOL/L (ref 20–29)
COLOR UR: YELLOW
CREAT SERPL-MCNC: 0.66 MG/DL (ref 0.57–1)
CRYSTALS URNS MICRO: ABNORMAL
EGFRCR SERPLBLD CKD-EPI 2021: 100 ML/MIN/1.73
EOSINOPHIL # BLD AUTO: 0.1 X10E3/UL (ref 0–0.4)
EOSINOPHIL NFR BLD AUTO: 2 %
EPI CELLS #/AREA URNS HPF: ABNORMAL /HPF (ref 0–10)
ERYTHROCYTE [DISTWIDTH] IN BLOOD BY AUTOMATED COUNT: 12 % (ref 11.7–15.4)
FOLATE SERPL-MCNC: 15.6 NG/ML
GLOBULIN SER CALC-MCNC: 2.3 G/DL (ref 1.5–4.5)
GLUCOSE SERPL-MCNC: 121 MG/DL (ref 70–99)
GLUCOSE UR QL STRIP: NEGATIVE
HBA1C MFR BLD: 6.4 % (ref 4.8–5.6)
HCT VFR BLD AUTO: 42.8 % (ref 34–46.6)
HDLC SERPL-MCNC: 44 MG/DL
HGB BLD-MCNC: 14.7 G/DL (ref 11.1–15.9)
HGB UR QL STRIP: NEGATIVE
IMM GRANULOCYTES # BLD AUTO: 0 X10E3/UL (ref 0–0.1)
IMM GRANULOCYTES NFR BLD AUTO: 0 %
KETONES UR QL STRIP: ABNORMAL
LDLC SERPL CALC-MCNC: 70 MG/DL (ref 0–99)
LEUKOCYTE ESTERASE UR QL STRIP: ABNORMAL
LYMPHOCYTES # BLD AUTO: 1.6 X10E3/UL (ref 0.7–3.1)
LYMPHOCYTES NFR BLD AUTO: 21 %
MAGNESIUM SERPL-MCNC: 1.9 MG/DL (ref 1.6–2.3)
MCH RBC QN AUTO: 31.5 PG (ref 26.6–33)
MCHC RBC AUTO-ENTMCNC: 34.3 G/DL (ref 31.5–35.7)
MCV RBC AUTO: 92 FL (ref 79–97)
MICRO URNS: ABNORMAL
MONOCYTES # BLD AUTO: 0.5 X10E3/UL (ref 0.1–0.9)
MONOCYTES NFR BLD AUTO: 7 %
MUCOUS THREADS URNS QL MICRO: PRESENT
NEUTROPHILS # BLD AUTO: 5.3 X10E3/UL (ref 1.4–7)
NEUTROPHILS NFR BLD AUTO: 70 %
NITRITE UR QL STRIP: NEGATIVE
PH UR STRIP: 5.5 [PH] (ref 5–7.5)
PLATELET # BLD AUTO: 241 X10E3/UL (ref 150–450)
POTASSIUM SERPL-SCNC: 4.8 MMOL/L (ref 3.5–5.2)
PROT SERPL-MCNC: 6.9 G/DL (ref 6–8.5)
PROT UR QL STRIP: ABNORMAL
RBC # BLD AUTO: 4.66 X10E6/UL (ref 3.77–5.28)
RBC #/AREA URNS HPF: ABNORMAL /HPF (ref 0–2)
SODIUM SERPL-SCNC: 140 MMOL/L (ref 134–144)
SP GR UR STRIP: 1.02 (ref 1–1.03)
T4 FREE SERPL-MCNC: 1.29 NG/DL (ref 0.82–1.77)
TRIGL SERPL-MCNC: 105 MG/DL (ref 0–149)
TSH SERPL DL<=0.005 MIU/L-ACNC: 0.97 UIU/ML (ref 0.45–4.5)
UNIDENT CRYS URNS QL MICRO: PRESENT
UROBILINOGEN UR STRIP-MCNC: 0.2 MG/DL (ref 0.2–1)
VIT B12 SERPL-MCNC: 923 PG/ML (ref 232–1245)
VLDLC SERPL CALC-MCNC: 19 MG/DL (ref 5–40)
WBC # BLD AUTO: 7.7 X10E3/UL (ref 3.4–10.8)
WBC #/AREA URNS HPF: ABNORMAL /HPF (ref 0–5)

## 2024-08-05 NOTE — PROGRESS NOTES
Subjective   Anabella Baum is a 59 y.o. female.     History of Present Illness    Since the last visit, she has overall felt well.  She has Primary Hypertension and well controlled on current medication, Impaired fasting glucose and will monitor labs to watch for DMII, GERD controlled on PPI Rx, Hyperlipidemia with goals met with current Rx, and Vitamin D deficiency and labs are at goal >30 ng/mL.  she has been compliant with current medications have reviewed them.  The patient denies medication side effects.  Will refill medications. LMP  (LMP Unknown) .      Weight down 6 lbs----does watch diet----some walking  33 pk yr hx  Results for orders placed or performed in visit on 07/02/24   Comprehensive metabolic panel    Specimen: Blood   Result Value Ref Range    Glucose 121 (H) 70 - 99 mg/dL    BUN 15 8 - 27 mg/dL    Creatinine 0.66 0.57 - 1.00 mg/dL    EGFR Result 100 >59 mL/min/1.73    BUN/Creatinine Ratio 23 12 - 28    Sodium 140 134 - 144 mmol/L    Potassium 4.8 3.5 - 5.2 mmol/L    Chloride 100 96 - 106 mmol/L    Total CO2 23 20 - 29 mmol/L    Calcium 9.9 8.7 - 10.3 mg/dL    Total Protein 6.9 6.0 - 8.5 g/dL    Albumin 4.6 3.8 - 4.9 g/dL    Globulin 2.3 1.5 - 4.5 g/dL    Total Bilirubin 0.6 0.0 - 1.2 mg/dL    Alkaline Phosphatase 148 (H) 44 - 121 IU/L    AST (SGOT) 18 0 - 40 IU/L    ALT (SGPT) 20 0 - 32 IU/L   Lipid panel    Specimen: Blood   Result Value Ref Range    Total Cholesterol 133 100 - 199 mg/dL    Triglycerides 105 0 - 149 mg/dL    HDL Cholesterol 44 >39 mg/dL    VLDL Cholesterol Tyler 19 5 - 40 mg/dL    LDL Chol Calc (NIH) 70 0 - 99 mg/dL   TSH    Specimen: Blood   Result Value Ref Range    TSH 0.968 0.450 - 4.500 uIU/mL   Hemoglobin A1c    Specimen: Blood   Result Value Ref Range    Hemoglobin A1C 6.4 (H) 4.8 - 5.6 %   Vitamin D,25-Hydroxy    Specimen: Blood   Result Value Ref Range    25 Hydroxy, Vitamin D 46.3 30.0 - 100.0 ng/mL   Vitamin B12    Specimen: Blood   Result Value Ref Range    Vitamin  B-12 923 232 - 1,245 pg/mL   Folate    Specimen: Blood   Result Value Ref Range    Folate 15.6 >3.0 ng/mL   T4, Free    Specimen: Blood   Result Value Ref Range    Free T4 1.29 0.82 - 1.77 ng/dL   Magnesium    Specimen: Blood   Result Value Ref Range    Magnesium 1.9 1.6 - 2.3 mg/dL   Microscopic Examination -   Result Value Ref Range    WBC, UA None seen 0 - 5 /hpf    RBC, UA None seen 0 - 2 /hpf    Epithelial Cells (non renal) 0-10 0 - 10 /hpf    Casts Present (A) None seen /lpf    Cast Type Hyaline casts N/A    Crystals, UA Present (A) N/A    Crystal Type Calcium Oxalate N/A    Mucus, UA Present Not Estab.    Bacteria, UA None seen None seen/Few   CBC and Differential    Specimen: Blood   Result Value Ref Range    WBC 7.7 3.4 - 10.8 x10E3/uL    RBC 4.66 3.77 - 5.28 x10E6/uL    Hemoglobin 14.7 11.1 - 15.9 g/dL    Hematocrit 42.8 34.0 - 46.6 %    MCV 92 79 - 97 fL    MCH 31.5 26.6 - 33.0 pg    MCHC 34.3 31.5 - 35.7 g/dL    RDW 12.0 11.7 - 15.4 %    Platelets 241 150 - 450 x10E3/uL    Neutrophil Rel % 70 Not Estab. %    Lymphocyte Rel % 21 Not Estab. %    Monocyte Rel % 7 Not Estab. %    Eosinophil Rel % 2 Not Estab. %    Basophil Rel % 0 Not Estab. %    Neutrophils Absolute 5.3 1.4 - 7.0 x10E3/uL    Lymphocytes Absolute 1.6 0.7 - 3.1 x10E3/uL    Monocytes Absolute 0.5 0.1 - 0.9 x10E3/uL    Eosinophils Absolute 0.1 0.0 - 0.4 x10E3/uL    Basophils Absolute 0.0 0.0 - 0.2 x10E3/uL    Immature Granulocyte Rel % 0 Not Estab. %    Immature Grans Absolute 0.0 0.0 - 0.1 x10E3/uL   Urinalysis With Microscopic - Urine, Clean Catch    Specimen: Urine, Clean Catch   Result Value Ref Range    Specific Gravity, UA 1.024 1.005 - 1.030    pH, UA 5.5 5.0 - 7.5    Color, UA Yellow Yellow    Appearance, UA Cloudy (A) Clear    Leukocytes, UA Trace (A) Negative    Protein 1+ (A) Negative/Trace    Glucose, UA Negative Negative    Ketones Trace (A) Negative    Blood, UA Negative Negative    Bilirubin, UA Negative Negative    Urobilinogen,  UA 0.2 0.2 - 1.0 mg/dL    Nitrite, UA Negative Negative    Microscopic Examination See below:    Sister and mom DMII  Labs done 7-12-24  ' Has seen me over a year ago 5/3/2023 noting her blood pressure was elevated and restarted lisinopril with follow-up visit 5/24/2023 noting blood pressure under control with lisinopril.  Also noted still needs carotid Doppler.  Had prior carotid Doppler with left carotid artery mild stenosis. Had doppler 6-22-23 and resulted mild stenosis bilat. Continue statin and low dose ASA. --repeat scan 1 yr  Noted her history of GERD with PPI treatment with previous evaluation GI Dr. Rivera with prior EGD with concern for intestinal metaplasia developing Pelayo's esophagus.  This was on 11/23/2020 also had dilation of her esophagus and placed referral for her follow-up EGD and colonoscopy last visit  I did have patient see cardiology and saw Dr. Wright 12/30/2020.  I asked her to see cardiology after noting coronary artery calcification on her low-dose lung CT scan due to her smoking history.  EKG was normal and he did do a stress test and this was negative normal and asked her to follow-up only as needed   Saw ortho DR Lujan 2-14-24 hamstring strain left  Had DEXA 11-3-23 Osteopenia with FRAX 12% and 1.2%    Had low dose CT chest 6-22-23     FINDINGS: The central airways are patent without endobronchial lesion.  Mild upper lobe predominant paraseptal emphysematous change is present.  Bilateral pleural parenchymal scarring is present. Tiny nodular density  within the left upper lobe on image 80 is without interim change. Sub-3  mm right upper lobe nodules on images 58 and 59 are also without change.  No pleural or pericardial effusion is seen. No pathological mediastinal  lymphadenopathy. Calcified coronary artery disease is present. Calcified  plaque within the thoracic aorta. No pathological axillary  lymphadenopathy. The visualized upper abdomen demonstrates changes from  prior  cholecystectomy.     IMPRESSION:  1. ACR Lung-RADS category 2. Benign appearance or behavior. Continue  annual screening with low-dose CT in 12 months.  2. CTDI 2.8 mGy. .3 mGycm.  3. Marked calcified coronary artery disease.    Took Lamisil ---fungal fingernail    The following portions of the patient's history were reviewed and updated as appropriate: allergies, current medications, past family history, past medical history, past social history, past surgical history, and problem list.    Review of Systems   Constitutional:  Negative for diaphoresis.   HENT:  Negative for nosebleeds and trouble swallowing.    Eyes:  Negative for blurred vision and visual disturbance.   Respiratory:  Negative for choking.    Gastrointestinal:  Negative for blood in stool.   Allergic/Immunologic: Negative for immunocompromised state.   Neurological:  Negative for facial asymmetry and speech difficulty.   Psychiatric/Behavioral:  Negative for self-injury and suicidal ideas.        Objective   Physical Exam  Vitals and nursing note reviewed.   Constitutional:       General: She is not in acute distress.     Appearance: She is well-developed. She is not ill-appearing or toxic-appearing.   HENT:      Head: Normocephalic.      Right Ear: External ear normal.      Left Ear: External ear normal.      Nose: Nose normal.      Mouth/Throat:      Pharynx: Oropharynx is clear.   Eyes:      General: No scleral icterus.     Conjunctiva/sclera: Conjunctivae normal.      Pupils: Pupils are equal, round, and reactive to light.   Neck:      Thyroid: No thyromegaly.   Cardiovascular:      Rate and Rhythm: Normal rate and regular rhythm.      Heart sounds: Normal heart sounds. No murmur heard.  Pulmonary:      Effort: Pulmonary effort is normal. No respiratory distress.      Breath sounds: Rhonchi present.      Comments: Had rhonchi and then resolved with continued deep breathing she did show emphysematous changes on her last low-dose CT can  start her on albuterol at any time and order PFTs  Musculoskeletal:         General: No deformity. Normal range of motion.      Cervical back: Normal range of motion and neck supple.   Skin:     General: Skin is warm and dry.      Findings: Lesion present. No rash.      Comments: Left thumb medial aspect of nail its linear black discoloration with the nail deformed consistent with nail fungus will see dermatology next week and make sure not concerned for melanoma   Neurological:      General: No focal deficit present.      Mental Status: She is alert and oriented to person, place, and time. Mental status is at baseline.   Psychiatric:         Mood and Affect: Mood normal.         Behavior: Behavior normal.         Thought Content: Thought content normal.         Judgment: Judgment normal.           Assessment & Plan   Diagnoses and all orders for this visit:    1. Essential hypertension (Primary)  -     Comprehensive Metabolic Panel; Future  -     Hemoglobin A1c; Future    2. Vitamin D deficiency    3. Gastroesophageal reflux disease without esophagitis  -     Ambulatory Referral to Gastroenterology    4. Tobacco abuse  -     Comprehensive Metabolic Panel; Future  -     Hemoglobin A1c; Future    5. Bilateral carotid artery stenosis  -      CT Chest Low Dose Cancer Screening WO    6. Encounter for screening mammogram for breast cancer  -     Mammo Screening Digital Tomosynthesis Bilateral With CAD    7. Mixed hyperlipidemia    8. Low serum vitamin B12    9. Impaired fasting glucose  -     Comprehensive Metabolic Panel; Future  -     Hemoglobin A1c; Future      Continue B12 and vitamin D  Consider seeing derm---has appt DR Truong next week  Must see me Q 6 mos  Plan, Anabella Baum, was seen today.  she was seen for HTN and continue medication, Imparied fasting glucose and plan follow up labs, diet, and exercise, Hyperlipidemia and will continue current medication, and Vitamin D deficiency and supplemented.  Due  for carotid doppler--mild bilat stenosis  Still overdue for EGD------had prior 2020 with short segment concerning for Pelayo's   Was due June for low dose CT Chest screen. --order  Consider Evista----mom had Osteoporosis--- bone density was done 11/3/2023 noting osteopenia but FRAX score was in acceptable range.--repeat 2 yrs  Want LDL <55-70  Stop smoking  Also notes she is down 6 pounds from December and not trying I want her to get at least weekly weights and to see me for follow-up if continue to lose weight for no reason  Do consider Metformin XR----on diet and exercise.... Low glycemic index diet and lets check labs again in 6 months  Answers submitted by the patient for this visit:  Other (Submitted on 7/30/2024)  Please describe your symptoms.: Lab and medicine review.  Discuss leg weakness.  Discuss diarrhea and referral to GI doctor.  Have you had these symptoms before?: Yes  How long have you been having these symptoms?: Greater than 2 weeks  Please list any medications you are currently taking for this condition.: None  Please describe any probable cause for these symptoms. : Spinal arthritis  Primary Reason for Visit (Submitted on 7/30/2024)  What is the primary reason for your visit?: Other

## 2024-08-06 ENCOUNTER — OFFICE VISIT (OUTPATIENT)
Dept: FAMILY MEDICINE CLINIC | Facility: CLINIC | Age: 59
End: 2024-08-06
Payer: COMMERCIAL

## 2024-08-06 VITALS
RESPIRATION RATE: 16 BRPM | BODY MASS INDEX: 25.47 KG/M2 | SYSTOLIC BLOOD PRESSURE: 130 MMHG | HEART RATE: 87 BPM | TEMPERATURE: 97.8 F | HEIGHT: 64 IN | OXYGEN SATURATION: 97 % | DIASTOLIC BLOOD PRESSURE: 80 MMHG | WEIGHT: 149.2 LBS

## 2024-08-06 DIAGNOSIS — Z72.0 TOBACCO ABUSE: Chronic | ICD-10-CM

## 2024-08-06 DIAGNOSIS — E55.9 VITAMIN D DEFICIENCY: Chronic | ICD-10-CM

## 2024-08-06 DIAGNOSIS — I10 ESSENTIAL HYPERTENSION: Primary | Chronic | ICD-10-CM

## 2024-08-06 DIAGNOSIS — E53.8 LOW SERUM VITAMIN B12: ICD-10-CM

## 2024-08-06 DIAGNOSIS — I10 BENIGN ESSENTIAL HTN: ICD-10-CM

## 2024-08-06 DIAGNOSIS — Z12.31 ENCOUNTER FOR SCREENING MAMMOGRAM FOR BREAST CANCER: ICD-10-CM

## 2024-08-06 DIAGNOSIS — K21.9 GASTROESOPHAGEAL REFLUX DISEASE WITHOUT ESOPHAGITIS: Chronic | ICD-10-CM

## 2024-08-06 DIAGNOSIS — R73.01 IMPAIRED FASTING GLUCOSE: ICD-10-CM

## 2024-08-06 DIAGNOSIS — I65.23 BILATERAL CAROTID ARTERY STENOSIS: Chronic | ICD-10-CM

## 2024-08-06 DIAGNOSIS — E78.2 MIXED HYPERLIPIDEMIA: Chronic | ICD-10-CM

## 2024-08-06 RX ORDER — ATORVASTATIN CALCIUM 80 MG/1
80 TABLET, FILM COATED ORAL NIGHTLY
Qty: 90 TABLET | Refills: 3 | Status: SHIPPED | OUTPATIENT
Start: 2024-08-06

## 2024-08-06 RX ORDER — LANSOPRAZOLE 30 MG/1
30 CAPSULE, DELAYED RELEASE ORAL DAILY
Qty: 90 CAPSULE | Refills: 3 | Status: SHIPPED | OUTPATIENT
Start: 2024-08-06

## 2024-08-06 RX ORDER — LANSOPRAZOLE 30 MG/1
30 CAPSULE, DELAYED RELEASE ORAL DAILY
Qty: 90 CAPSULE | Refills: 3 | Status: SHIPPED | OUTPATIENT
Start: 2024-08-06 | End: 2024-08-06 | Stop reason: SDUPTHER

## 2024-08-06 RX ORDER — LISINOPRIL 10 MG/1
10 TABLET ORAL DAILY
Qty: 90 TABLET | Refills: 1 | Status: SHIPPED | OUTPATIENT
Start: 2024-08-06

## 2024-08-06 NOTE — PATIENT INSTRUCTIONS
"Hypertension, Adult  High blood pressure (hypertension) is when the force of blood pumping through the arteries is too strong. The arteries are the blood vessels that carry blood from the heart throughout the body. Hypertension forces the heart to work harder to pump blood and may cause arteries to become narrow or stiff. Untreated or uncontrolled hypertension can lead to a heart attack, heart failure, a stroke, kidney disease, and other problems.  A blood pressure reading consists of a higher number over a lower number. Ideally, your blood pressure should be below 120/80. The first (\"top\") number is called the systolic pressure. It is a measure of the pressure in your arteries as your heart beats. The second (\"bottom\") number is called the diastolic pressure. It is a measure of the pressure in your arteries as the heart relaxes.  What are the causes?  The exact cause of this condition is not known. There are some conditions that result in high blood pressure.  What increases the risk?  Certain factors may make you more likely to develop high blood pressure. Some of these risk factors are under your control, including:  Smoking.  Not getting enough exercise or physical activity.  Being overweight.  Having too much fat, sugar, calories, or salt (sodium) in your diet.  Drinking too much alcohol.  Other risk factors include:  Having a personal history of heart disease, diabetes, high cholesterol, or kidney disease.  Stress.  Having a family history of high blood pressure and high cholesterol.  Having obstructive sleep apnea.  Age. The risk increases with age.  What are the signs or symptoms?  High blood pressure may not cause symptoms. Very high blood pressure (hypertensive crisis) may cause:  Headache.  Fast or irregular heartbeats (palpitations).  Shortness of breath.  Nosebleed.  Nausea and vomiting.  Vision changes.  Severe chest pain, dizziness, and seizures.  How is this diagnosed?  This condition is diagnosed by " measuring your blood pressure while you are seated, with your arm resting on a flat surface, your legs uncrossed, and your feet flat on the floor. The cuff of the blood pressure monitor will be placed directly against the skin of your upper arm at the level of your heart. Blood pressure should be measured at least twice using the same arm. Certain conditions can cause a difference in blood pressure between your right and left arms.  If you have a high blood pressure reading during one visit or you have normal blood pressure with other risk factors, you may be asked to:  Return on a different day to have your blood pressure checked again.  Monitor your blood pressure at home for 1 week or longer.  If you are diagnosed with hypertension, you may have other blood or imaging tests to help your health care provider understand your overall risk for other conditions.  How is this treated?  This condition is treated by making healthy lifestyle changes, such as eating healthy foods, exercising more, and reducing your alcohol intake. You may be referred for counseling on a healthy diet and physical activity.  Your health care provider may prescribe medicine if lifestyle changes are not enough to get your blood pressure under control and if:  Your systolic blood pressure is above 130.  Your diastolic blood pressure is above 80.  Your personal target blood pressure may vary depending on your medical conditions, your age, and other factors.  Follow these instructions at home:  Eating and drinking    Eat a diet that is high in fiber and potassium, and low in sodium, added sugar, and fat. An example of this eating plan is called the DASH diet. DASH stands for Dietary Approaches to Stop Hypertension. To eat this way:  Eat plenty of fresh fruits and vegetables. Try to fill one half of your plate at each meal with fruits and vegetables.  Eat whole grains, such as whole-wheat pasta, brown rice, or whole-grain bread. Fill about one  fourth of your plate with whole grains.  Eat or drink low-fat dairy products, such as skim milk or low-fat yogurt.  Avoid fatty cuts of meat, processed or cured meats, and poultry with skin. Fill about one fourth of your plate with lean proteins, such as fish, chicken without skin, beans, eggs, or tofu.  Avoid pre-made and processed foods. These tend to be higher in sodium, added sugar, and fat.  Reduce your daily sodium intake. Many people with hypertension should eat less than 1,500 mg of sodium a day.  Do not drink alcohol if:  Your health care provider tells you not to drink.  You are pregnant, may be pregnant, or are planning to become pregnant.  If you drink alcohol:  Limit how much you have to:  0-1 drink a day for women.  0-2 drinks a day for men.  Know how much alcohol is in your drink. In the [place] one drink equals one 12 oz bottle of beer (355 mL), one 5 oz glass of wine (148 mL), or one 1½ oz glass of hard liquor (44 mL).  Lifestyle    Work with your health care provider to maintain a healthy body weight or to lose weight. Ask what an ideal weight is for you.  Get at least 30 minutes of exercise that causes your heart to beat faster (aerobic exercise) most days of the week. Activities may include walking, swimming, or biking.  Include exercise to strengthen your muscles (resistance exercise), such as Pilates or lifting weights, as part of your weekly exercise routine. Try to do these types of exercises for 30 minutes at least 3 days a week.  Do not use any products that contain nicotine or tobacco. These products include cigarettes, chewing tobacco, and vaping devices, such as e-cigarettes. If you need help quitting, ask your health care provider.  Monitor your blood pressure at home as told by your health care provider.  Keep all follow-up visits. This is important.  Medicines  Take over-the-counter and prescription medicines only as told by your health care provider. Follow directions carefully. Blood  pressure medicines must be taken as prescribed.  Do not skip doses of blood pressure medicine. Doing this puts you at risk for problems and can make the medicine less effective.  Ask your health care provider about side effects or reactions to medicines that you should watch for.  Contact a health care provider if you:  Think you are having a reaction to a medicine you are taking.  Have headaches that keep coming back (recurring).  Feel dizzy.  Have swelling in your ankles.  Have trouble with your vision.  Get help right away if you:  Develop a severe headache or confusion.  Have unusual weakness or numbness.  Feel faint.  Have severe pain in your chest or abdomen.  Vomit repeatedly.  Have trouble breathing.  These symptoms may be an emergency. Get help right away. Call 911.  Do not wait to see if the symptoms will go away.  Do not drive yourself to the hospital.  Summary  Hypertension is when the force of blood pumping through your arteries is too strong. If this condition is not controlled, it may put you at risk for serious complications.  Your personal target blood pressure may vary depending on your medical conditions, your age, and other factors. For most people, a normal blood pressure is less than 120/80.  Hypertension is treated with lifestyle changes, medicines, or a combination of both. Lifestyle changes include losing weight, eating a healthy, low-sodium diet, exercising more, and limiting alcohol.  This information is not intended to replace advice given to you by your health care provider. Make sure you discuss any questions you have with your health care provider.  Document Revised: 10/25/2022 Document Reviewed: 10/25/2022  Elsevier Patient Education © 2024 [employer]     Pt with afib on coumadin, therapeutically anticoagulated.  - monitor INR for goal of 2-3.   - will hold metop in this setting given bronchospasm. Resume when respiratory status improves. Afib on Coumadin. INR elevated in setting of acute infection.   - Monitor INR for goal of 2-3  - Holding metoprolol, resume when respiratory status improves  - Holding Coumadin given elevated INR Afib on Coumadin. INR elevated in setting of acute infection.   - Monitor INR for goal of 2-3  - Holding metoprolol, resume when respiratory status improves  - Holding Coumadin given elevated INR Afib on Coumadin. INR elevated in setting of acute infection.   - Monitor INR for goal of 2-3  - Holding metoprolol, resume when respiratory status improves  - Holding Coumadin given elevated INR Afib on Coumadin. INR elevated in setting of acute infection.   - Monitor INR for goal of 2-3  - Resumed metoprolol,    - Holding Coumadin given elevated INR

## 2024-08-20 NOTE — PROGRESS NOTES
"Chief Complaint  Heartburn, Weight Loss, and Colon Cancer Screening    Subjective        Anabella Baum is a 59 year-old female, new to our practice. Past medical history is significant for GERD, Pelayo's esophagus, vitamin D deficiency, hypertension, hyperlipidemia. Patient presents to [facility] consults for GERD, diarrhea >6 months, and abnormal weight loss.    GERD and BE history with esophageal dilation in the past.  Patient presents GERD symptoms that seem to be under controlled with PPI, Prevacid 30mg daily. However, due to most recent unintentional weight loss, at least 5 lbs within a short time (?time frame), PCP is concerning about this, coupled with her known history of small focus of intestinal metaplasia consistent with Pelayo esophagus without dysplasia, noted in EGD 2020. Patient denies nausea/vomiting. No difficulty swallowing. May need to repeat upper endoscopy.     11/23/20 EGD, Dr. Miguel Arora - gastritis, ?EOE due to esophageal mucosal changes, esophageal stenosis s/p dilatation.     2/23/2015 Colonoscopy, Dr. Goff - negative findings. Full or detailed report not available for my review. However, noted 3 years CLS survellence recommended (due 11/23/23)    Known history of osteopenia with DEXA scan on 11/3/2023, recommendation to repeat in 2 years (11/2025). ALK level is noted to be 148 on 7/12/24    Diarrhea  She also notices some recent changes in bowel habits. More loose stool episodes than normal baseline, diarrhea-like. 1-2 episodes a day. No blood in stool. No abdominal pain. No most recent travel or antimicrobial therapy that may attribute to diarrhea symptoms.    Objective   Vital Signs:  /70   Pulse 95   Temp 96.3 °F (35.7 °C)   Ht 162.6 cm (64.02\")   Wt 67.2 kg (148 lb 1.6 oz)   SpO2 98%   BMI 25.41 kg/m²   Estimated body mass index is 25.41 kg/m² as calculated from the following:    Height as of this encounter: 162.6 cm (64.02\").    " Weight as of this encounter: 67.2 kg (148 lb 1.6 oz).     Physical Exam  Vitals and nursing note reviewed.   Constitutional:       General: She is not in acute distress.     Appearance: Normal appearance. She is normal weight. She is not ill-appearing, toxic-appearing or diaphoretic.   Eyes:      General: No scleral icterus.     Conjunctiva/sclera: Conjunctivae normal.   Cardiovascular:      Rate and Rhythm: Normal rate and regular rhythm.      Pulses: Normal pulses.      Heart sounds: Normal heart sounds.   Pulmonary:      Effort: Pulmonary effort is normal. No respiratory distress.      Breath sounds: Normal breath sounds. No stridor.   Abdominal:      General: Abdomen is flat. Bowel sounds are normal. There is no distension.      Palpations: Abdomen is soft. There is no mass.      Tenderness: There is no abdominal tenderness. There is no right CVA tenderness, left CVA tenderness, guarding or rebound.      Hernia: No hernia is present.   Skin:     Coloration: Skin is not jaundiced or pale.      Findings: No erythema or rash.   Neurological:      Mental Status: She is alert and oriented to person, place, and time. Mental status is at baseline.   Psychiatric:         Mood and Affect: Mood normal.        Result Review :    The following data was reviewed by: SUSANNA Storey on 08/21/2024:     Progress Notes by Manuela Baum PA-C (08/06/2024 10:45)     Lab Results   Component Value Date    GLUCOSE 121 (H) 07/12/2024    BUN 15 07/12/2024    CREATININE 0.66 07/12/2024     07/12/2024    K 4.8 07/12/2024     07/12/2024    CALCIUM 9.9 07/12/2024    ALBUMIN 4.6 07/12/2024    ALT 20 07/12/2024    AST 18 07/12/2024    ALKPHOS 148 (H) 07/12/2024    BILITOT 0.6 07/12/2024    BCR 23 07/12/2024    ANIONGAP 11.1 11/15/2022    EGFR 103.4 11/15/2022     Lab Results   Component Value Date    WBC 7.7 07/12/2024    HGB 14.7 07/12/2024    HCT 42.8 07/12/2024    MCV 92 07/12/2024     07/12/2024     Lab Results    Component Value Date    LTODOCVW79 923 07/12/2024     Lab Results   Component Value Date    FOLATE 15.6 07/12/2024       Assessment and Plan     Diagnoses and all orders for this visit:    1. Elevated alkaline phosphatase level  -     Vitamin D 25 Hydroxy  -     CBC & Differential  -     Comprehensive Metabolic Panel    2. Pelayo's esophagus without dysplasia    3. Osteopenia, unspecified location  -     Vitamin D 25 Hydroxy    4. Abnormal weight loss    5. Gastroesophageal reflux disease without esophagitis    6. Bowel habit changes      Discussion  Patient is a 59-year-old female new to me presents with the following concerns:    GERD,  Pelayo's esophagus without dysplasia,  Encounter for colonoscopy screening, and abnormal weight loss   I have reviewed the patient's EGD and colonoscopy records.  In consideration of her known history of irregular Z-line consistent with Pelayo's esophagus, acid reflux, and abnormal weight loss, I recommend that we proceed with another surveillance bidirectional endoscopy.  Risk versus benefits discussed with patient agreeable to proceed.    Elevated alkaline phosphate   Multifactorial, could be related to osteopenia.  Normal LFT dated on 7/12/2024.  I will repeat CBC, CMP, vitamin D level.  If alkaline phosphate continue to rise and/or changes with liver function enzymes, will consider for further workups accordingly.   Further recommendations to follow in 5 weeks after endoscopy workups.      Follow Up     Return for 5 weeks after egd/cls.    I have reviewed patient's current medications list, relevant clinical information necessary for today's encounter. I discussed the clinical impression and treatment plan with the patient, who verbalized understanding and in agreement.  All questions were answered and support was provided.

## 2024-08-21 ENCOUNTER — OFFICE VISIT (OUTPATIENT)
Dept: GASTROENTEROLOGY | Facility: CLINIC | Age: 59
End: 2024-08-21
Payer: COMMERCIAL

## 2024-08-21 ENCOUNTER — LAB (OUTPATIENT)
Dept: LAB | Facility: HOSPITAL | Age: 59
End: 2024-08-21
Payer: COMMERCIAL

## 2024-08-21 ENCOUNTER — PREP FOR SURGERY (OUTPATIENT)
Dept: SURGERY | Facility: SURGERY CENTER | Age: 59
End: 2024-08-21
Payer: COMMERCIAL

## 2024-08-21 VITALS
DIASTOLIC BLOOD PRESSURE: 70 MMHG | BODY MASS INDEX: 25.29 KG/M2 | HEART RATE: 95 BPM | SYSTOLIC BLOOD PRESSURE: 110 MMHG | WEIGHT: 148.1 LBS | OXYGEN SATURATION: 98 % | TEMPERATURE: 96.3 F | HEIGHT: 64 IN

## 2024-08-21 DIAGNOSIS — R63.4 ABNORMAL WEIGHT LOSS: ICD-10-CM

## 2024-08-21 DIAGNOSIS — K21.9 GASTROESOPHAGEAL REFLUX DISEASE WITHOUT ESOPHAGITIS: ICD-10-CM

## 2024-08-21 DIAGNOSIS — R74.8 ELEVATED ALKALINE PHOSPHATASE LEVEL: ICD-10-CM

## 2024-08-21 DIAGNOSIS — K22.70 BARRETT'S ESOPHAGUS WITHOUT DYSPLASIA: ICD-10-CM

## 2024-08-21 DIAGNOSIS — R19.7 DIARRHEA, UNSPECIFIED TYPE: ICD-10-CM

## 2024-08-21 DIAGNOSIS — M85.80 OSTEOPENIA, UNSPECIFIED LOCATION: ICD-10-CM

## 2024-08-21 DIAGNOSIS — R19.4 BOWEL HABIT CHANGES: ICD-10-CM

## 2024-08-21 DIAGNOSIS — R19.8 IRREGULAR BOWEL HABITS: ICD-10-CM

## 2024-08-21 LAB
25(OH)D3 SERPL-MCNC: 55 NG/ML (ref 30–100)
ALBUMIN SERPL-MCNC: 4.6 G/DL (ref 3.5–5.2)
ALBUMIN/GLOB SERPL: 1.6 G/DL
ALP SERPL-CCNC: 159 U/L (ref 39–117)
ALT SERPL W P-5'-P-CCNC: 28 U/L (ref 1–33)
ANION GAP SERPL CALCULATED.3IONS-SCNC: 9.3 MMOL/L (ref 5–15)
AST SERPL-CCNC: 23 U/L (ref 1–32)
BASOPHILS # BLD AUTO: 0.03 10*3/MM3 (ref 0–0.2)
BASOPHILS NFR BLD AUTO: 0.3 % (ref 0–1.5)
BILIRUB SERPL-MCNC: 0.5 MG/DL (ref 0–1.2)
BUN SERPL-MCNC: 12 MG/DL (ref 8–23)
BUN/CREAT SERPL: 21.1 (ref 7–25)
CALCIUM SPEC-SCNC: 10 MG/DL (ref 8.6–10.5)
CHLORIDE SERPL-SCNC: 104 MMOL/L (ref 98–107)
CO2 SERPL-SCNC: 27.7 MMOL/L (ref 22–29)
CREAT SERPL-MCNC: 0.57 MG/DL (ref 0.57–1)
DEPRECATED RDW RBC AUTO: 39.8 FL (ref 37–54)
EGFRCR SERPLBLD CKD-EPI 2021: 104.2 ML/MIN/1.73
EOSINOPHIL # BLD AUTO: 0.14 10*3/MM3 (ref 0–0.4)
EOSINOPHIL NFR BLD AUTO: 1.3 % (ref 0.3–6.2)
ERYTHROCYTE [DISTWIDTH] IN BLOOD BY AUTOMATED COUNT: 12 % (ref 12.3–15.4)
GLOBULIN UR ELPH-MCNC: 2.8 GM/DL
GLUCOSE SERPL-MCNC: 78 MG/DL (ref 65–99)
HCT VFR BLD AUTO: 43.1 % (ref 34–46.6)
HGB BLD-MCNC: 14.8 G/DL (ref 12–15.9)
IMM GRANULOCYTES # BLD AUTO: 0.02 10*3/MM3 (ref 0–0.05)
IMM GRANULOCYTES NFR BLD AUTO: 0.2 % (ref 0–0.5)
LYMPHOCYTES # BLD AUTO: 3.23 10*3/MM3 (ref 0.7–3.1)
LYMPHOCYTES NFR BLD AUTO: 30.9 % (ref 19.6–45.3)
MCH RBC QN AUTO: 31.1 PG (ref 26.6–33)
MCHC RBC AUTO-ENTMCNC: 34.3 G/DL (ref 31.5–35.7)
MCV RBC AUTO: 90.5 FL (ref 79–97)
MONOCYTES # BLD AUTO: 0.8 10*3/MM3 (ref 0.1–0.9)
MONOCYTES NFR BLD AUTO: 7.7 % (ref 5–12)
NEUTROPHILS NFR BLD AUTO: 59.6 % (ref 42.7–76)
NEUTROPHILS NFR BLD AUTO: 6.22 10*3/MM3 (ref 1.7–7)
NRBC BLD AUTO-RTO: 0 /100 WBC (ref 0–0.2)
PLATELET # BLD AUTO: 255 10*3/MM3 (ref 140–450)
PMV BLD AUTO: 10.9 FL (ref 6–12)
POTASSIUM SERPL-SCNC: 4.1 MMOL/L (ref 3.5–5.2)
PROT SERPL-MCNC: 7.4 G/DL (ref 6–8.5)
RBC # BLD AUTO: 4.76 10*6/MM3 (ref 3.77–5.28)
SODIUM SERPL-SCNC: 141 MMOL/L (ref 136–145)
WBC NRBC COR # BLD AUTO: 10.44 10*3/MM3 (ref 3.4–10.8)

## 2024-08-21 PROCEDURE — 82787 IGG 1 2 3 OR 4 EACH: CPT | Performed by: NURSE PRACTITIONER

## 2024-08-21 PROCEDURE — 82977 ASSAY OF GGT: CPT | Performed by: NURSE PRACTITIONER

## 2024-08-21 PROCEDURE — 82306 VITAMIN D 25 HYDROXY: CPT | Performed by: NURSE PRACTITIONER

## 2024-08-21 PROCEDURE — 36415 COLL VENOUS BLD VENIPUNCTURE: CPT | Performed by: NURSE PRACTITIONER

## 2024-08-21 PROCEDURE — 85025 COMPLETE CBC W/AUTO DIFF WBC: CPT | Performed by: NURSE PRACTITIONER

## 2024-08-21 PROCEDURE — 86381 MITOCHONDRIAL ANTIBODY EACH: CPT | Performed by: NURSE PRACTITIONER

## 2024-08-21 PROCEDURE — 80053 COMPREHEN METABOLIC PANEL: CPT | Performed by: NURSE PRACTITIONER

## 2024-08-21 RX ORDER — SODIUM CHLORIDE, SODIUM LACTATE, POTASSIUM CHLORIDE, CALCIUM CHLORIDE 600; 310; 30; 20 MG/100ML; MG/100ML; MG/100ML; MG/100ML
30 INJECTION, SOLUTION INTRAVENOUS CONTINUOUS PRN
OUTPATIENT
Start: 2024-08-21

## 2024-08-21 RX ORDER — SODIUM CHLORIDE 0.9 % (FLUSH) 0.9 %
3 SYRINGE (ML) INJECTION EVERY 12 HOURS SCHEDULED
OUTPATIENT
Start: 2024-08-21

## 2024-08-21 RX ORDER — SODIUM CHLORIDE 0.9 % (FLUSH) 0.9 %
10 SYRINGE (ML) INJECTION AS NEEDED
OUTPATIENT
Start: 2024-08-21

## 2024-08-22 DIAGNOSIS — R74.8 ELEVATED ALKALINE PHOSPHATASE LEVEL: Primary | ICD-10-CM

## 2024-08-22 LAB — GGT SERPL-CCNC: 27 U/L (ref 5–36)

## 2024-08-23 LAB
IGG4 SER-MCNC: 16 MG/DL (ref 2–96)
MITOCHONDRIA M2 IGG SER-ACNC: <20 UNITS (ref 0–20)

## 2024-08-26 ENCOUNTER — HOSPITAL ENCOUNTER (INPATIENT)
Age: 59
LOS: 4 days | Discharge: HOME | End: 2024-08-30
Attending: EMERGENCY MEDICINE
Payer: MEDICARE

## 2024-08-26 ENCOUNTER — APPOINTMENT (OUTPATIENT)
Dept: MRI IMAGING | Age: 59
End: 2024-08-26
Payer: MEDICARE

## 2024-08-26 ENCOUNTER — APPOINTMENT (OUTPATIENT)
Dept: GENERAL RADIOLOGY | Age: 59
End: 2024-08-26
Payer: MEDICARE

## 2024-08-26 DIAGNOSIS — I66.9 OCCLUSION AND STENOSIS OF CEREBRAL ARTERY: Primary | ICD-10-CM

## 2024-08-26 PROBLEM — I65.21 ICAO (INTERNAL CAROTID ARTERY OCCLUSION), RIGHT: Status: ACTIVE | Noted: 2024-08-26

## 2024-08-26 LAB
ANION GAP SERPL CALCULATED.3IONS-SCNC: 15 MMOL/L (ref 9–16)
ANTI-XA UNFRAC HEPARIN: <0.1 IU/L
BASOPHILS # BLD: 0.07 K/UL (ref 0–0.2)
BASOPHILS NFR BLD: 0 % (ref 0–2)
BUN BLD-MCNC: 5 MG/DL (ref 8–26)
BUN SERPL-MCNC: 5 MG/DL (ref 8–23)
CA-I BLD-SCNC: 1.24 MMOL/L (ref 1.15–1.33)
CALCIUM SERPL-MCNC: 9.8 MG/DL (ref 8.6–10.4)
CHLORIDE BLD-SCNC: 100 MMOL/L (ref 98–107)
CHLORIDE SERPL-SCNC: 99 MMOL/L (ref 98–107)
CK SERPL-CCNC: 92 U/L (ref 26–192)
CO2 BLD CALC-SCNC: 33 MMOL/L (ref 22–30)
CO2 SERPL-SCNC: 24 MMOL/L (ref 20–31)
CREAT SERPL-MCNC: 1 MG/DL (ref 0.5–0.9)
EGFR, POC: 84 ML/MIN/1.73M2
EOSINOPHIL # BLD: 0.17 K/UL (ref 0–0.44)
EOSINOPHILS RELATIVE PERCENT: 1 % (ref 1–4)
ERYTHROCYTE [DISTWIDTH] IN BLOOD BY AUTOMATED COUNT: 14.5 % (ref 11.8–14.4)
ERYTHROCYTE [DISTWIDTH] IN BLOOD BY AUTOMATED COUNT: 14.6 % (ref 11.8–14.4)
GFR, ESTIMATED: 66 ML/MIN/1.73M2
GLUCOSE BLD-MCNC: 217 MG/DL (ref 74–100)
GLUCOSE SERPL-MCNC: 213 MG/DL (ref 74–99)
HCO3 VENOUS: 31.9 MMOL/L (ref 22–29)
HCT VFR BLD AUTO: 39.9 % (ref 36.3–47.1)
HCT VFR BLD AUTO: 44.7 % (ref 36.3–47.1)
HCT VFR BLD AUTO: 50 % (ref 36–46)
HGB BLD-MCNC: 13.7 G/DL (ref 11.9–15.1)
HGB BLD-MCNC: 14.7 G/DL (ref 11.9–15.1)
IMM GRANULOCYTES # BLD AUTO: 0.04 K/UL (ref 0–0.3)
IMM GRANULOCYTES NFR BLD: 0 %
INR PPP: 1.1
INR PPP: 1.1
LYMPHOCYTES NFR BLD: 3.1 K/UL (ref 1.1–3.7)
LYMPHOCYTES RELATIVE PERCENT: 20 % (ref 24–43)
MCH RBC QN AUTO: 29.6 PG (ref 25.2–33.5)
MCH RBC QN AUTO: 29.9 PG (ref 25.2–33.5)
MCHC RBC AUTO-ENTMCNC: 32.9 G/DL (ref 28.4–34.8)
MCHC RBC AUTO-ENTMCNC: 34.3 G/DL (ref 28.4–34.8)
MCV RBC AUTO: 87.1 FL (ref 82.6–102.9)
MCV RBC AUTO: 89.9 FL (ref 82.6–102.9)
MONOCYTES NFR BLD: 0.73 K/UL (ref 0.1–1.2)
MONOCYTES NFR BLD: 5 % (ref 3–12)
MYOGLOBIN SERPL-MCNC: 37 NG/ML (ref 25–58)
NEUTROPHILS NFR BLD: 74 % (ref 36–65)
NEUTS SEG NFR BLD: 11.66 K/UL (ref 1.5–8.1)
NRBC BLD-RTO: 0 PER 100 WBC
NRBC BLD-RTO: 0 PER 100 WBC
O2 SAT, VEN: 43.6 % (ref 60–85)
PARTIAL THROMBOPLASTIN TIME: 33.1 SEC (ref 23–36.5)
PARTIAL THROMBOPLASTIN TIME: 72.6 SEC (ref 23–36.5)
PCO2 VENOUS: 60.7 MM HG (ref 41–51)
PH VENOUS: 7.33 (ref 7.32–7.43)
PLATELET # BLD AUTO: 311 K/UL (ref 138–453)
PLATELET # BLD AUTO: 319 K/UL (ref 138–453)
PMV BLD AUTO: 10.5 FL (ref 8.1–13.5)
PMV BLD AUTO: 10.8 FL (ref 8.1–13.5)
PO2 VENOUS: 26.9 MM HG (ref 30–50)
POC ANION GAP: 12 MMOL/L (ref 7–16)
POC CREATININE: 0.8 MG/DL (ref 0.51–1.19)
POC HEMOGLOBIN (CALC): 17.2 G/DL (ref 12–16)
POC LACTIC ACID: 2.5 MMOL/L (ref 0.56–1.39)
POSITIVE BASE EXCESS, VEN: 3.7 MMOL/L (ref 0–3)
POTASSIUM BLD-SCNC: 3.3 MMOL/L (ref 3.5–4.5)
POTASSIUM SERPL-SCNC: 3.3 MMOL/L (ref 3.7–5.3)
PROTHROMBIN TIME: 13.9 SEC (ref 11.7–14.9)
PROTHROMBIN TIME: 14.1 SEC (ref 11.7–14.9)
RBC # BLD AUTO: 4.58 M/UL (ref 3.95–5.11)
RBC # BLD AUTO: 4.97 M/UL (ref 3.95–5.11)
RBC # BLD: ABNORMAL 10*6/UL
SODIUM BLD-SCNC: 144 MMOL/L (ref 138–146)
SODIUM SERPL-SCNC: 138 MMOL/L (ref 136–145)
TROPONIN I SERPL HS-MCNC: 11 NG/L (ref 0–14)
WBC OTHER # BLD: 15.8 K/UL (ref 3.5–11.3)
WBC OTHER # BLD: 16.5 K/UL (ref 3.5–11.3)

## 2024-08-26 PROCEDURE — 85014 HEMATOCRIT: CPT

## 2024-08-26 PROCEDURE — 82550 ASSAY OF CK (CPK): CPT

## 2024-08-26 PROCEDURE — 83874 ASSAY OF MYOGLOBIN: CPT

## 2024-08-26 PROCEDURE — 6360000002 HC RX W HCPCS

## 2024-08-26 PROCEDURE — 93005 ELECTROCARDIOGRAM TRACING: CPT | Performed by: STUDENT IN AN ORGANIZED HEALTH CARE EDUCATION/TRAINING PROGRAM

## 2024-08-26 PROCEDURE — 80051 ELECTROLYTE PANEL: CPT

## 2024-08-26 PROCEDURE — 85520 HEPARIN ASSAY: CPT

## 2024-08-26 PROCEDURE — 85027 COMPLETE CBC AUTOMATED: CPT

## 2024-08-26 PROCEDURE — 2580000003 HC RX 258

## 2024-08-26 PROCEDURE — 82330 ASSAY OF CALCIUM: CPT

## 2024-08-26 PROCEDURE — 6370000000 HC RX 637 (ALT 250 FOR IP)

## 2024-08-26 PROCEDURE — 70551 MRI BRAIN STEM W/O DYE: CPT

## 2024-08-26 PROCEDURE — 85730 THROMBOPLASTIN TIME PARTIAL: CPT

## 2024-08-26 PROCEDURE — 70547 MR ANGIOGRAPHY NECK W/O DYE: CPT

## 2024-08-26 PROCEDURE — 99222 1ST HOSP IP/OBS MODERATE 55: CPT | Performed by: PSYCHIATRY & NEUROLOGY

## 2024-08-26 PROCEDURE — 80048 BASIC METABOLIC PNL TOTAL CA: CPT

## 2024-08-26 PROCEDURE — 85610 PROTHROMBIN TIME: CPT

## 2024-08-26 PROCEDURE — 83036 HEMOGLOBIN GLYCOSYLATED A1C: CPT

## 2024-08-26 PROCEDURE — 84520 ASSAY OF UREA NITROGEN: CPT

## 2024-08-26 PROCEDURE — 85025 COMPLETE CBC W/AUTO DIFF WBC: CPT

## 2024-08-26 PROCEDURE — 70544 MR ANGIOGRAPHY HEAD W/O DYE: CPT

## 2024-08-26 PROCEDURE — 84484 ASSAY OF TROPONIN QUANT: CPT

## 2024-08-26 PROCEDURE — 82553 CREATINE MB FRACTION: CPT

## 2024-08-26 PROCEDURE — 82565 ASSAY OF CREATININE: CPT

## 2024-08-26 PROCEDURE — 99285 EMERGENCY DEPT VISIT HI MDM: CPT

## 2024-08-26 PROCEDURE — 80061 LIPID PANEL: CPT

## 2024-08-26 PROCEDURE — 83605 ASSAY OF LACTIC ACID: CPT

## 2024-08-26 PROCEDURE — 2000000000 HC ICU R&B

## 2024-08-26 PROCEDURE — 82947 ASSAY GLUCOSE BLOOD QUANT: CPT

## 2024-08-26 PROCEDURE — 71045 X-RAY EXAM CHEST 1 VIEW: CPT

## 2024-08-26 PROCEDURE — 82803 BLOOD GASES ANY COMBINATION: CPT

## 2024-08-26 RX ORDER — AMLODIPINE BESYLATE 10 MG/1
10 TABLET ORAL DAILY
Status: DISCONTINUED | OUTPATIENT
Start: 2024-08-26 | End: 2024-08-30

## 2024-08-26 RX ORDER — HEPARIN SODIUM 10000 [USP'U]/100ML
5-30 INJECTION, SOLUTION INTRAVENOUS CONTINUOUS
Status: DISCONTINUED | OUTPATIENT
Start: 2024-08-26 | End: 2024-08-30

## 2024-08-26 RX ORDER — ONDANSETRON 2 MG/ML
4 INJECTION INTRAMUSCULAR; INTRAVENOUS EVERY 6 HOURS PRN
Status: DISCONTINUED | OUTPATIENT
Start: 2024-08-26 | End: 2024-08-30 | Stop reason: HOSPADM

## 2024-08-26 RX ORDER — ACETAMINOPHEN 325 MG/1
650 TABLET ORAL EVERY 4 HOURS PRN
Status: DISCONTINUED | OUTPATIENT
Start: 2024-08-26 | End: 2024-08-30 | Stop reason: HOSPADM

## 2024-08-26 RX ORDER — SODIUM CHLORIDE 0.9 % (FLUSH) 0.9 %
5-40 SYRINGE (ML) INJECTION EVERY 12 HOURS SCHEDULED
Status: DISCONTINUED | OUTPATIENT
Start: 2024-08-26 | End: 2024-08-30 | Stop reason: HOSPADM

## 2024-08-26 RX ORDER — CETIRIZINE HYDROCHLORIDE 10 MG/1
10 TABLET ORAL DAILY
COMMUNITY
Start: 2024-02-05

## 2024-08-26 RX ORDER — AMLODIPINE BESYLATE 10 MG/1
1 TABLET ORAL DAILY
COMMUNITY
Start: 2024-02-05

## 2024-08-26 RX ORDER — ATORVASTATIN CALCIUM 40 MG/1
1 TABLET, FILM COATED ORAL
COMMUNITY
Start: 2024-06-24

## 2024-08-26 RX ORDER — 0.9 % SODIUM CHLORIDE 0.9 %
1000 INTRAVENOUS SOLUTION INTRAVENOUS ONCE
Status: COMPLETED | OUTPATIENT
Start: 2024-08-26 | End: 2024-08-26

## 2024-08-26 RX ORDER — LORAZEPAM 2 MG/ML
1 INJECTION INTRAMUSCULAR ONCE
Status: COMPLETED | OUTPATIENT
Start: 2024-08-26 | End: 2024-08-26

## 2024-08-26 RX ORDER — ATORVASTATIN CALCIUM 40 MG/1
40 TABLET, FILM COATED ORAL
Status: DISCONTINUED | OUTPATIENT
Start: 2024-08-26 | End: 2024-08-30 | Stop reason: HOSPADM

## 2024-08-26 RX ORDER — ONDANSETRON 4 MG/1
4 TABLET, ORALLY DISINTEGRATING ORAL EVERY 8 HOURS PRN
Status: DISCONTINUED | OUTPATIENT
Start: 2024-08-26 | End: 2024-08-30 | Stop reason: HOSPADM

## 2024-08-26 RX ORDER — POLYETHYLENE GLYCOL 3350 17 G/17G
17 POWDER, FOR SOLUTION ORAL DAILY PRN
Status: DISCONTINUED | OUTPATIENT
Start: 2024-08-26 | End: 2024-08-30 | Stop reason: HOSPADM

## 2024-08-26 RX ORDER — SODIUM CHLORIDE 9 MG/ML
INJECTION, SOLUTION INTRAVENOUS PRN
Status: DISCONTINUED | OUTPATIENT
Start: 2024-08-26 | End: 2024-08-30 | Stop reason: HOSPADM

## 2024-08-26 RX ORDER — GABAPENTIN 400 MG/1
400 CAPSULE ORAL 2 TIMES DAILY
Status: DISCONTINUED | OUTPATIENT
Start: 2024-08-26 | End: 2024-08-30 | Stop reason: HOSPADM

## 2024-08-26 RX ORDER — CLOPIDOGREL BISULFATE 75 MG/1
75 TABLET ORAL DAILY
Status: DISCONTINUED | OUTPATIENT
Start: 2024-08-27 | End: 2024-08-30 | Stop reason: HOSPADM

## 2024-08-26 RX ORDER — GABAPENTIN 400 MG/1
1600 CAPSULE ORAL NIGHTLY
COMMUNITY
Start: 2024-08-13

## 2024-08-26 RX ORDER — LORAZEPAM 2 MG/ML
1 INJECTION INTRAMUSCULAR ONCE
Status: DISCONTINUED | OUTPATIENT
Start: 2024-08-26 | End: 2024-08-26

## 2024-08-26 RX ORDER — LABETALOL HYDROCHLORIDE 5 MG/ML
10 INJECTION, SOLUTION INTRAVENOUS EVERY 10 MIN PRN
Status: DISCONTINUED | OUTPATIENT
Start: 2024-08-26 | End: 2024-08-27

## 2024-08-26 RX ORDER — CLOPIDOGREL 300 MG/1
300 TABLET, FILM COATED ORAL ONCE
Status: COMPLETED | OUTPATIENT
Start: 2024-08-26 | End: 2024-08-26

## 2024-08-26 RX ORDER — SODIUM CHLORIDE 0.9 % (FLUSH) 0.9 %
5-40 SYRINGE (ML) INJECTION PRN
Status: DISCONTINUED | OUTPATIENT
Start: 2024-08-26 | End: 2024-08-30 | Stop reason: HOSPADM

## 2024-08-26 RX ORDER — ASPIRIN 81 MG/1
81 TABLET ORAL DAILY
COMMUNITY

## 2024-08-26 RX ORDER — ASPIRIN 81 MG/1
81 TABLET ORAL DAILY
Status: DISCONTINUED | OUTPATIENT
Start: 2024-08-27 | End: 2024-08-30 | Stop reason: HOSPADM

## 2024-08-26 RX ORDER — CYCLOBENZAPRINE HCL 10 MG
10 TABLET ORAL
COMMUNITY
Start: 2024-06-16

## 2024-08-26 RX ADMIN — Medication 1 MG: at 16:35

## 2024-08-26 RX ADMIN — ATORVASTATIN CALCIUM 40 MG: 80 TABLET, FILM COATED ORAL at 21:00

## 2024-08-26 RX ADMIN — CLOPIDOGREL BISULFATE 300 MG: 300 TABLET, FILM COATED ORAL at 21:00

## 2024-08-26 RX ADMIN — HEPARIN SODIUM 12 UNITS/KG/HR: 10000 INJECTION, SOLUTION INTRAVENOUS at 18:44

## 2024-08-26 RX ADMIN — SODIUM CHLORIDE, PRESERVATIVE FREE 10 ML: 5 INJECTION INTRAVENOUS at 20:57

## 2024-08-26 RX ADMIN — SODIUM CHLORIDE 1000 ML: 9 INJECTION, SOLUTION INTRAVENOUS at 20:57

## 2024-08-26 ASSESSMENT — LIFESTYLE VARIABLES
HOW OFTEN DO YOU HAVE A DRINK CONTAINING ALCOHOL: NEVER
HOW MANY STANDARD DRINKS CONTAINING ALCOHOL DO YOU HAVE ON A TYPICAL DAY: PATIENT DOES NOT DRINK

## 2024-08-26 ASSESSMENT — PAIN SCALES - GENERAL: PAINLEVEL_OUTOF10: 0

## 2024-08-26 ASSESSMENT — PAIN - FUNCTIONAL ASSESSMENT: PAIN_FUNCTIONAL_ASSESSMENT: NONE - DENIES PAIN

## 2024-08-26 NOTE — CONSULTS
Department of Neurology/Telestroke/Stroke  Resident Consult Note  Stroke Alert @1:55 PM  Arrival at bedside @1:55 PM    Reason for Consult:  ED Stroke Alert  Requesting Physician:  Rocco Blount DO  Endovascular Neurosurgeon:   Emil Norris MD    Stroke Team:  Kvng Zaman MD      History Obtained From:  patient, electronic medical record    CHIEF COMPLAINT:       Transient left-sided weakness, left facial droop, speech disturbance    HISTORY OF PRESENT ILLNESS:       The patient is a 59 y.o. female HTN, smoker, TIAs, who presents as a transfer from [facility] for transient left facial droop and left-sided weakness    Patient states that she noticed having shaking of her lower extremity for about 10 minutes and then noticed having left leg weakness for about an hour until subsided on its own, she states that she had TIAs in the past with left-sided weakness, should be taken aspirin but states that she has not been taking for the past week or so, she was seen at [facility] with an initial blood pressure of 200s, was given a dose of labetalol to draw blood pressure, stroke team were consulted.  CT head was unremarkable for acute abnormalities.  However did show hypodensity at the right frontal right parietal lobes, remote lacunar infarct at the right caudate head.  CTA head and neck per report was showing a right ICA stenosis with reconstitution from ACOM.  Decision was to start patient on IV heparin and he will receive a loading dose of aspirin 324 mg p.o. once prior to arrival.  Blood pressure over here was 180s.  Patient was noted to ambulate, NIH of 2 due to right inferior quadrantanopsia and slurred speech.  Did have some weakness at the lower extremity noted on exam.    LKW: 8/25 4PM  NIHSS: 2  Modified Gilead Scale: 0  SBP: 180s  Glucose: 217  CT head without contrast: No acute abnormalities, hypodensity at the right frontal, right parietal, right caudate head  TNK: Not a candidate due to  extent   Social Connections: Moderately Isolated (3/10/2020)    Received from [facility]    Social Connection and Isolation Panel [NHANES]     Frequency of Communication with Friends and Family: More than three times a week     Frequency of Social Gatherings with Friends and Family: Three times a week     Attends [employer] Services: Never     Active Member of Clubs or Organizations: No     Attends Club or Organization Meetings: Never     Marital Status:    Intimate Partner Violence: Not on file   Housing Stability: Not on file       Family History:   No family history on file.    Allergies:  Amoxicillin, Lisinopril, and Ziprasidone hcl    Home Medications:  Prior to Admission medications    Medication Sig Start Date End Date Taking? Authorizing Provider   amLODIPine (NORVASC) 10 MG tablet Take 1 tablet by mouth daily 2/5/24  Yes Charley Macario MD   atorvastatin (LIPITOR) 40 MG tablet Take 1 tablet by mouth nightly 6/24/24  Yes Charley Macario MD   cetirizine (ZYRTEC) 10 MG tablet Take 1 tablet by mouth daily 2/5/24  Yes Charley Macario MD   cyclobenzaprine (FLEXERIL) 10 MG tablet Take 1 tablet by mouth 6/16/24  Yes Charley Macario MD   gabapentin (NEURONTIN) 400 MG capsule Take 1 capsule by mouth. 8/13/24  Yes Charley Macario MD   metFORMIN (GLUCOPHAGE) 500 MG tablet Take 1 tablet by mouth daily (with breakfast) 5/3/24  Yes Charley Macario MD   aspirin 81 MG EC tablet Take 1 tablet by mouth daily    ProviderCharley MD       Current Medications:   No current facility-administered medications for this encounter.    REVIEW OF SYSTEMS:       CONSTITUTIONAL: negative for fatigue and malaise   EYES: negative for double vision and photophobia    HEENT: negative for tinnitus and sore throat   RESPIRATORY: negative for cough, shortness of breath   CARDIOVASCULAR: negative for chest pain, palpitations   GASTROINTESTINAL: negative for nausea, vomiting  Palsy:  0 - normal symmetric movement  5a.  Motor left arm:  0 - no drift, limb holds 90 (or 45) degrees for full 10 seconds  5b.  Motor right arm:  0 - no drift, limb holds 90 (or 45) degrees for full 10 seconds  6a.  Motor left le - no drift; leg holds 30 degree position for full 5 seconds  6b.  Motor right le - no drift; leg holds 30 degree position for full 5 seconds  7.    Limb Ataxia:  0 - absent  8.    Sensory:  0 - normal; no sensory loss  9.    Best Language:  0 - no aphasia, normal  10.  Dysarthria:  1 - mild to moderate, patient slurs at least some words and at worst, can be understood with some difficulty  11.  Extinction and Inattention:  0 - no abnormality    TOTAL:  2   Modified Taz Score Scale (before stroke):   [] Zero: No symptoms at all   [] 1: No significant disability despite symptoms; able to carry out all usual duties and activities   [] 2: Slight disability; unable to carry out all previous activities, but able to look after own affairs without assistance   [] 3:Moderate disability; requiring some help, but able to walk without assistance   [] 4: Moderately severe disability; unable to walk and attend to bodily needs without assistance   [] 5:Severe disability; bedridden, incontinent and requiring constant nursing care and attention        LABS AND IMAGING:   CBC with Differential:    Lab Results   Component Value Date/Time    WBC 15.8 2024 02:03 PM    RBC 4.97 2024 02:03 PM    HGB 14.7 2024 02:03 PM    HCT 44.7 2024 02:03 PM     2024 02:03 PM    MCV 89.9 2024 02:03 PM    MCH 29.6 2024 02:03 PM    MCHC 32.9 2024 02:03 PM    RDW 14.6 2024 02:03 PM    LYMPHOPCT 20 2024 02:03 PM    MONOPCT 5 2024 02:03 PM    EOSPCT 1 2024 02:03 PM    BASOPCT 0 2024 02:03 PM    MONOSABS 0.73 2024 02:03 PM    LYMPHSABS 3.10 2024 02:03 PM    EOSABS 0.17 2024 02:03 PM    BASOSABS 0.07 2024 02:03

## 2024-08-26 NOTE — CARE COORDINATION
Case Management Assessment  Initial Evaluation    Date/Time of Evaluation: 8/26/2024 6:17 PM  Assessment Completed by: Ana Cid    If patient is discharged prior to next notation, then this note serves as note for discharge by case management.    Patient Name: Violeta Hawk                   YOB: 1965  Diagnosis: ICAO (internal carotid artery occlusion), right [I65.21]                   Date / Time: 8/26/2024  1:48 PM    Patient Admission Status: Inpatient   Readmission Risk (Low < 19, Mod (19-27), High > 27): No data recorded  Current PCP: Virginia Ramires APRN - NP  PCP verified by CM? (P) Yes (AAYUSH Fofana)    Chart Reviewed: Yes      History Provided by: (P) Patient  Patient Orientation: (P) Alert and Oriented, Person, Place, Situation, Self    Patient Cognition: (P) Alert    Hospitalization in the last 30 days (Readmission):  No    If yes, Readmission Assessment in  Navigator will be completed.    Advance Directives:      Code Status: Not on file   Patient's Primary Decision Maker is: (P) Legal Next of Kin      Discharge Planning:    Patient lives with: (P) Alone Type of Home: (P) House  Primary Care Giver: (P) Self  Patient Support Systems include: (P) Family Members   Current Financial resources: (P) Other (Comment) (has BCBS)  Current community resources: (P) None  Current services prior to admission: (P) None            Current DME:              Type of Home Care services:  (P) None    ADLS  Prior functional level: (P) Independent in ADLs/IADLs  Current functional level: (P) Assistance with the following:, Bathing, Dressing, Toileting, Cooking, Housework, Shopping, Mobility, Other (see comment) (needs assist d/t CVA & hospitalization)    PT AM-PAC:   /24  OT AM-PAC:   /24    Family can provide assistance at DC: (P) Yes  Would you like Case Management to discuss the discharge plan with any other family members/significant others, and if so, who? (P) No  Plans to Return to  Present Housing: (P) Yes  Other Identified Issues/Barriers to RETURNING to current housing: none  Potential Assistance needed at discharge: (P) N/A            Potential DME:    Patient expects to discharge to: (P) Apartment  Plan for transportation at discharge: (P) Family    Financial    Payor: /     Does insurance require precert for SNF: Yes    Potential assistance Purchasing Medications: (P) No  Meds-to-Beds request:      No Pharmacies Listed    Notes:    Factors facilitating achievement of predicted outcomes: Family support, Motivated, Cooperative, and Pleasant    Barriers to discharge: Limited insight into deficits, Decreased endurance, and Medical complications    Additional Case Management Notes: plan is to return home independent and daughter will transport     The Plan for Transition of Care is related to the following treatment goals of ICAO (internal carotid artery occlusion), right [I65.21]    IF APPLICABLE: The Patient and/or patient representative Violeta and her family were provided with a choice of provider and agrees with the discharge plan. Freedom of choice list with basic dialogue that supports the patient's individualized plan of care/goals and shares the quality data associated with the providers was provided to: (P) Patient   Patient Representative Name:       The Patient and/or Patient Representative Agree with the Discharge Plan? (P) Yes    Case Management Services Information Letter Provided [x]    Ana Cid RN/HONORIO  Case Management Department  Ph: [phone]

## 2024-08-26 NOTE — ED TRIAGE NOTES
Pt presented to ED 17 as a transfer from [facility], accompanied by EMS.  Pt presents with C/O CVA, Slurred Speech Left sided weakness.  Pt states She went to [facility] today for slurred speech. Per report a CT scan was completed at [facility] and noted a Right ICA Occlusion.   PTA pt was given 10mg of labetalol, 0.5mg of ativan, 324mg of Aspirin, 4900unit Heparin bolus and arrived on a heparin drip.   Pt has a hx of HTN, diabetes.  Pt is Alert and oriented. Pt is resting comfortably on stretcher with call light in reach.  No acute distress noted. Respirations are even and unlabored.  White board updated. Will continue to follow plan of care.

## 2024-08-26 NOTE — ED PROVIDER NOTES
[facility] ED  Emergency Department Encounter  Emergency Medicine Resident     Pt Name:Violeta Hawk  MRN: [id number]  Birthdate 1965  Date of evaluation: 8/26/24  PCP:  No primary care provider on file.  Note Started: 1:51 PM EDT      CHIEF COMPLAINT       Chief Complaint   Patient presents with    Aphasia    Facial Droop       HISTORY OF PRESENT ILLNESS  (Location/Symptom, Timing/Onset, Context/Setting, Quality, Duration, Modifying Factors, Severity.)      Violeta Hawk is a 59 y.o. female who presents as a transfer from outside facility as a stroke alert.  Patient has a history of TIAs.  Patient reports shaking of the left lower extremity as well as weakness and facial droop starting yesterday around 4 PM.    Patient presented to outside hospital had elevated blood pressure and had a CT and CTA done showing right ICA stenosis with collateral from the ACOM.  Stroke consult was done at outside hospital decision was made for ED to ED transfer with stroke alert noncritical.    Patient denying any complaints currently besides some slurred speech and left lower extremity weakness.  Denying any other complaints at this time.    PAST MEDICAL / SURGICAL / SOCIAL / FAMILY HISTORY      has no past medical history on file.       has no past surgical history on file.      Social History     Socioeconomic History    Marital status: Single     Spouse name: Not on file    Number of children: Not on file    Years of education: Not on file    Highest education level: Not on file   Occupational History    Not on file   Tobacco Use    Smoking status: Not on file    Smokeless tobacco: Not on file   Substance and Sexual Activity    Alcohol use: Not on file    Drug use: Not on file    Sexual activity: Not on file   Other Topics Concern    Not on file   Social History Narrative    Not on file     Social Determinants of Health     Financial Resource Strain: Low Risk  (3/10/2020)    Received from [email or web address]  noncritical.  History of TIAs, smoker.  Slight dysarthria.  GCS 15, vital signs stable, AO x 3.  Stroke alert noncritical called.  Plan on admission to neurology floor versus ICU based on endovascular recommendations.  Patient to be admitted.      FINAL IMPRESSION      1. Occlusion and stenosis of cerebral artery          DISPOSITION / PLAN     DISPOSITION Decision To Admit 08/26/2024 02:41:09 PM  Condition at Disposition: Stable      PATIENT REFERRED TO:  No follow-up provider specified.    DISCHARGE MEDICATIONS:  New Prescriptions    No medications on file       Rocco Blount DO  Emergency Medicine Resident    (Please note that portions of thisnote were completed with a voice recognition program.  Efforts were made to edit the dictations but occasionally words are mis-transcribed.)

## 2024-08-26 NOTE — ED NOTES
Dr. Ramirez perfectserved regarding pts desire for nicotine patch. Also asked if plavix should be given while heparin drip initiated. Awaiting response

## 2024-08-26 NOTE — H&P
[facility] Neurology   IN-PATIENT SERVICE   [facility]    HISTORY AND PHYSICAL EXAMINATION            Date:   8/26/2024  Patient name:  Violeta Hawk  Date of admission:  8/26/2024  1:48 PM  MRN:   [id number]  Account:  [id number]  YOB: 1965  PCP:    No primary care provider on file.  Room:   [facility]  Code Status:    No Order    Chief Complaint:     Chief Complaint   Patient presents with    Aphasia    Facial Droop       History Obtained From:     patient, electronic medical record    History of Present Illness:     The patient is a 59 y.o. female HTN, smoker, TIAs, who presents as a transfer from [facility] for transient left facial droop and left-sided weakness and speech disturbance     Patient states that she noticed having shaking of her lower extremity for about 10 minutes and then noticed having left leg weakness for about an hour until subsided on its own, she states that she had TIAs in the past with left-sided weakness, should be taken aspirin but states that she has not been taking for the past week or so, she was seen at [facility] with an initial blood pressure of 200s, was given a dose of labetalol to draw blood pressure, stroke team were consulted.  CT head was unremarkable for acute abnormalities.  However did show hypodensity at the right frontal right parietal lobes, remote lacunar infarct at the right caudate head.  CTA head and neck per report was showing a right ICA stenosis with reconstitution from ACOM.  Decision was to start patient on IV heparin and he will receive a loading dose of aspirin 324 mg p.o. once prior to arrival.  Blood pressure over here was 180s.  Patient was noted to ambulate, NIH of 2 due to right inferior quadrantanopsia and slurred speech.  Did have some weakness at the lower extremity noted on exam.          Past Medical History:     No past medical history on file.     Past Surgical History:     No past surgical history on file.  is 2    Investigations:      Laboratory Testing:  Recent Results (from the past 24 hour(s))   STROKE PANEL    Collection Time: 08/26/24  2:03 PM   Result Value Ref Range    Sodium 138 136 - 145 mmol/L    Potassium 3.3 (L) 3.7 - 5.3 mmol/L    Chloride 99 98 - 107 mmol/L    CO2 24 20 - 31 mmol/L    Anion Gap 15 9 - 16 mmol/L    Glucose 213 (H) 74 - 99 mg/dL    BUN 5 (L) 8 - 23 mg/dL    Creatinine 1.0 (H) 0.50 - 0.90 mg/dL    Est, Glom Filt Rate 66 >60 mL/min/1.73m2    Calcium 9.8 8.6 - 10.4 mg/dL    WBC 15.8 (H) 3.5 - 11.3 k/uL    RBC 4.97 3.95 - 5.11 m/uL    Hemoglobin 14.7 11.9 - 15.1 g/dL    Hematocrit 44.7 36.3 - 47.1 %    MCV 89.9 82.6 - 102.9 fL    MCH 29.6 25.2 - 33.5 pg    MCHC 32.9 28.4 - 34.8 g/dL    RDW 14.6 (H) 11.8 - 14.4 %    Platelets 319 138 - 453 k/uL    MPV 10.5 8.1 - 13.5 fL    NRBC Automated 0.0 0.0 per 100 WBC    Total CK 92 26 - 192 U/L    Neutrophils % 74 (H) 36 - 65 %    Lymphocytes % 20 (L) 24 - 43 %    Monocytes % 5 3 - 12 %    Eosinophils % 1 1 - 4 %    Basophils % 0 0 - 2 %    Immature Granulocytes % 0 0 %    Neutrophils Absolute 11.66 (H) 1.50 - 8.10 k/uL    Lymphocytes Absolute 3.10 1.10 - 3.70 k/uL    Monocytes Absolute 0.73 0.10 - 1.20 k/uL    Eosinophils Absolute 0.17 0.00 - 0.44 k/uL    Basophils Absolute 0.07 0.00 - 0.20 k/uL    Immature Granulocytes Absolute 0.04 0.00 - 0.30 k/uL    RBC Morphology ANISOCYTOSIS PRESENT     Myoglobin 37 25 - 58 ng/mL    Protime 14.1 11.7 - 14.9 sec    INR 1.1     APTT 72.6 (H) 23.0 - 36.5 sec    Troponin, High Sensitivity 11 0 - 14 ng/L   Venous Blood Gas, POC    Collection Time: 08/26/24  2:03 PM   Result Value Ref Range    pH, John 7.329 7.320 - 7.430    pCO2, John 60.7 (H) 41.0 - 51.0 mm Hg    PO2, John 26.9 (L) 30.0 - 50.0 mm Hg    HCO3, Venous 31.9 (H) 22.0 - 29.0 mmol/L    Positive Base Excess, John 3.7 (H) 0.0 - 3.0 mmol/L    O2 Sat, John 43.6 (L) 60.0 - 85.0 %   ELECTROLYTES PLUS    Collection Time: 08/26/24  2:03 PM   Result Value Ref Range    POC

## 2024-08-26 NOTE — ED PROVIDER NOTES
FACULTY SIGN-OUT  ADDENDUM       Patient: Violeta Hawk   MRN: [id number]  PCP:  No primary care provider on file.  Note Started: 8/26/24, 3:38 PM EDT  Attestation  I was available and discussed any additional care issues that arose and coordinated the management plans with the resident(s) caring for the patient during my duty period. Any areas of disagreement with resident's documentation of care or procedures are noted on the chart. I was personally present for the key portions of any/all procedures during my duty period. I have documented in the chart those procedures where I was not present during the key portions.   The patient's initial evaluation and plan have been discussed with the prior provider who initially evaluated the patient.      Pertinent Comments:  The patient is a 59 y.o. female taken in signout with transfer for aphasia and symptoms starting greater than 24 hours ago with CTA showing right ICA occlusion currently heparinized  We are awaiting admission with neuroendovascular surgery    ED COURSE      The patient was given the following medications:  Orders Placed This Encounter   Medications    heparin 25,000 units in dextrose 5% 250 mL (premix) infusion    DISCONTD: LORazepam (ATIVAN) injection 1 mg    LORazepam (ATIVAN) injection 1 mg       RECENT VITALS:   BP: (!) 156/141  Pulse: 82  Respirations: 20  Temp: 97.3 °F (36.3 °C) SpO2: 96 %    (Please note that portions of this note were completed with a voice recognition program.  Efforts were made to edit the dictations but occasionally words are mis-transcribed.)    Mathieu Gonzalez MD [facility]  Attending Emergency Medicine Physician       Mathieu Gonzalez MD  08/26/24 [id number]

## 2024-08-26 NOTE — ED PROVIDER NOTES
[facility]  Emergency Department  Faculty Attestation     I performed a history and physical examination of the patient and discussed management with the resident. I reviewed the resident’s note and agree with the documented findings and plan of care. Any areas of disagreement are noted on the chart. I was personally present for the key portions of any procedures. I have documented in the chart those procedures where I was not present during the key portions. I have reviewed the emergency nurses triage note. I agree with the chief complaint, past medical history, past surgical history, allergies, medications, social and family history as documented unless otherwise noted below.    For Physician Assistant/ Nurse Practitioner cases/documentation I have personally evaluated this patient and have completed at least one if not all key elements of the E/M (history, physical exam, and MDM). Additional findings are as noted.    Preliminary note started at 1:59 PM EDT    Primary Care Physician:  No primary care provider on file.    Screenings:  [unfilled]    CHIEF COMPLAINT       Chief Complaint   Patient presents with    Aphasia    Facial Droop       RECENT VITALS:   There were no vitals taken for this visit.    LABS:  Labs Reviewed - No data to display    Radiology  No orders to display       CRITICAL CARE: There was a high probability of clinically significant/life threatening deterioration in this patient's condition which required my urgent intervention.  Total critical care time was 5 minutes.  This excludes any time for separately reportable procedures.     EKG:  EKG Interpretation    Interpreted by me    Rhythm: normal sinus   Rate: normal  Axis: normal  Ectopy: none  Conduction: Short ND  ST Segments: Subtle ST depression infero-laterally  T Waves: Diffuse flattening  Q Waves: none    Clinical Impression: Short ND, nonspecific ST and T wave changes    Attending Physician  Additional  Notes    Patient is for from outside hospital for stroke evaluation.  Late last evening she noticed her left arm and left leg was \"shaky\" but no actual seizure activity, felt weak, she fell hitting her head.  She was noted by her friend to have slurred speech, and a left facial droop.  When she woke in the morning she still had symptoms.  Through the day her arm and leg are stronger.  The left facial droop has persisted.  No headache.  No chest pain.  No palpitations.  CT head at outside hospital showed right ICA occlusion with collaterals.  She was given labetalol, started on heparin and transferred here for further evaluation.  On exam she is alert and oriented x 3.  Vital signs are normal.  GCS 15.  Normal mentation.  Speech is slurred.  Left facial droop is noted.  Normal pupils.  Gaze is conjugate.  No drift.  Normal finger-nose movements.  Impression is carotid occlusion, acute stroke symptoms.  Plan and stroke team consultation, review imaging and labs, maintain stable blood pressure, consider heparin, anticipate admission.            Steven Angel MD, FACEP  Attending Emergency  Physician                Steven Angel MD  08/26/24 1409       Steevn Angel MD  08/26/24 [id number]

## 2024-08-26 NOTE — PROGRESS NOTES
[facility] - [facility]     Emergency/Trauma Note    PATIENT NAME: Violeta Hawk    Shift date: 8/26/2024   Shift day: Monday   Shift # 1    Room # 17/17   Name: Violeta Hawk            Age: 59 y.o.  Gender: female          Hinduism: None   Place of Yazidism:     Trauma/Incident type: Stroke Alert  Admit Date & Time: 8/26/2024  1:48 PM    PATIENT/EVENT DESCRIPTION:  Violeta Hawk is a 59 y.o. female who arrived as a transfer from [facility]. Pt to be admitted to 17/17.         SPIRITUAL ASSESSMENT-INTERVENTION-OUTCOME:  Pt was awake and alert and texting on her phone when writer entered room. She said she was waiting for her daughter, Enid, to arrive. Pt shared that she's not had these kind of symptoms before and that it is concerning to her. Writer provided support through active listening and words of affirmation. Pt was receptive to spiritual health visit and expressed gratitude for support.      PATIENT BELONGINGS:  No belongings noted    ANY BELONGINGS OF SIGNIFICANT VALUE NOTED:      REGISTRATION STAFF NOTIFIED?  No      WHAT IS YOUR SPIRITUAL CARE PLAN FOR THIS PATIENT?:   Spiritual health team will remain available for spiritual and emotional support.     Electronically signed by Chaplain Rakesh, on 8/26/2024 at 3:35 PM.  [facility]  [phone]      08/26/24 1534   Encounter Summary   Encounter Overview/Reason Crisis   Service Provided For Patient   Referral/Consult From Multi-disciplinary team   Support System Children   Last Encounter  08/26/24   Complexity of Encounter Low   Begin Time 1430   End Time  1440   Total Time Calculated 10 min   Crisis   Type Code Stroke   Assessment/Intervention/Outcome   Assessment Calm;Coping   Intervention Active listening;Explored/Affirmed feelings, thoughts, concerns   Outcome Engaged in conversation;Expressed feelings, needs, and concerns;Expressed Gratitude;Receptive

## 2024-08-26 NOTE — CONSULTS
Endovascular Neurosurgery Consult      Reason for evaluation: Right ICA occlusion     SUBJECTIVE:   History of Chief Complaint:      The patient is a 59 y.o. female HTN, smoker, TIAs, who presents as a transfer from outlying facility for transient left facial droop and left-sided weakness     Patient states that she noticed having shaking of her lower extremity for about 10 minutes and then noticed having left leg weakness for about an hour until subsided on its own, she states that she had TIAs in the past with left-sided weakness, should be taken aspirin but states that she has not been taking for the past week or so, she was seen at [facility] with an initial blood pressure of 200s, was given a dose of labetalol to draw blood pressure, stroke team were consulted.  CT head was unremarkable for acute abnormalities.  However did show hypodensity at the right frontal right parietal lobes, remote lacunar infarct at the right caudate head.  CTA head and neck per report was showing a right ICA stenosis with reconstitution from ACOM.  Decision was to start patient on IV heparin and he will receive a loading dose of aspirin 324 mg p.o. once prior to arrival.  Blood pressure over here was 180s.  Patient was noted to ambulate, NIH of 2 due to right inferior quadrantanopsia and slurred speech.  Did have some weakness at the lower extremity noted on exam.      Allergies  is allergic to amoxicillin, lisinopril, and ziprasidone hcl.  Medications  Prior to Admission medications    Medication Sig Start Date End Date Taking? Authorizing Provider   amLODIPine (NORVASC) 10 MG tablet Take 1 tablet by mouth daily 2/5/24  Yes Provider, MD Charley   atorvastatin (LIPITOR) 40 MG tablet Take 1 tablet by mouth nightly 6/24/24  Yes Provider, MD Charley   cetirizine (ZYRTEC) 10 MG tablet Take 1 tablet by mouth daily 2/5/24  Yes Provider, MD Charley   cyclobenzaprine (FLEXERIL) 10 MG tablet Take 1 tablet by mouth 6/16/24

## 2024-08-26 NOTE — ED NOTES
58 yo Violeta Hawk [birth year] fell last night, followed by left-sided weakness and left facial droop and slurred speech, slept through the night, arrived there today, has left facial droop and slurred speech, CT brain negative, CTA shows right ICA occlusion with collaterals, Dr. Zaman recommends ED to ED with stroke alert.

## 2024-08-27 PROBLEM — I63.231 CEREBROVASCULAR ACCIDENT (CVA) DUE TO OCCLUSION OF RIGHT CAROTID ARTERY (HCC): Status: ACTIVE | Noted: 2024-08-27

## 2024-08-27 LAB
ANION GAP SERPL CALCULATED.3IONS-SCNC: 12 MMOL/L (ref 9–16)
ANTI-XA UNFRAC HEPARIN: 0.17 IU/L
ANTI-XA UNFRAC HEPARIN: 0.25 IU/L
ANTI-XA UNFRAC HEPARIN: 0.31 IU/L
ANTI-XA UNFRAC HEPARIN: 0.44 IU/L
BUN SERPL-MCNC: 7 MG/DL (ref 8–23)
CALCIUM SERPL-MCNC: 9.2 MG/DL (ref 8.6–10.4)
CHLORIDE SERPL-SCNC: 102 MMOL/L (ref 98–107)
CHOLEST SERPL-MCNC: 163 MG/DL (ref 0–199)
CHOLESTEROL/HDL RATIO: 4
CO2 SERPL-SCNC: 24 MMOL/L (ref 20–31)
CREAT SERPL-MCNC: 0.8 MG/DL (ref 0.5–0.9)
ERYTHROCYTE [DISTWIDTH] IN BLOOD BY AUTOMATED COUNT: 14.5 % (ref 11.8–14.4)
EST. AVERAGE GLUCOSE BLD GHB EST-MCNC: 203 MG/DL
GFR, ESTIMATED: 87 ML/MIN/1.73M2
GLUCOSE BLD-MCNC: 144 MG/DL (ref 65–105)
GLUCOSE BLD-MCNC: 179 MG/DL (ref 65–105)
GLUCOSE BLD-MCNC: 186 MG/DL (ref 65–105)
GLUCOSE SERPL-MCNC: 187 MG/DL (ref 74–99)
HBA1C MFR BLD: 8.7 % (ref 4–6)
HCT VFR BLD AUTO: 41 % (ref 36.3–47.1)
HDLC SERPL-MCNC: 41 MG/DL
HGB BLD-MCNC: 14 G/DL (ref 11.9–15.1)
LDLC SERPL CALC-MCNC: 100 MG/DL (ref 0–100)
MAGNESIUM SERPL-MCNC: 2.1 MG/DL (ref 1.6–2.4)
MCH RBC QN AUTO: 30.1 PG (ref 25.2–33.5)
MCHC RBC AUTO-ENTMCNC: 34.1 G/DL (ref 28.4–34.8)
MCV RBC AUTO: 88.2 FL (ref 82.6–102.9)
NRBC BLD-RTO: 0 PER 100 WBC
PLATELET # BLD AUTO: 291 K/UL (ref 138–453)
PMV BLD AUTO: 10.2 FL (ref 8.1–13.5)
POTASSIUM SERPL-SCNC: 3.4 MMOL/L (ref 3.7–5.3)
RBC # BLD AUTO: 4.65 M/UL (ref 3.95–5.11)
SODIUM SERPL-SCNC: 138 MMOL/L (ref 136–145)
TRIGL SERPL-MCNC: 111 MG/DL
VLDLC SERPL CALC-MCNC: 22 MG/DL
WBC OTHER # BLD: 17.2 K/UL (ref 3.5–11.3)

## 2024-08-27 PROCEDURE — 83735 ASSAY OF MAGNESIUM: CPT

## 2024-08-27 PROCEDURE — 6370000000 HC RX 637 (ALT 250 FOR IP)

## 2024-08-27 PROCEDURE — 85027 COMPLETE CBC AUTOMATED: CPT

## 2024-08-27 PROCEDURE — 2580000003 HC RX 258

## 2024-08-27 PROCEDURE — 92523 SPEECH SOUND LANG COMPREHEN: CPT

## 2024-08-27 PROCEDURE — 2000000000 HC ICU R&B

## 2024-08-27 PROCEDURE — 97161 PT EVAL LOW COMPLEX 20 MIN: CPT

## 2024-08-27 PROCEDURE — 99291 CRITICAL CARE FIRST HOUR: CPT | Performed by: STUDENT IN AN ORGANIZED HEALTH CARE EDUCATION/TRAINING PROGRAM

## 2024-08-27 PROCEDURE — 85520 HEPARIN ASSAY: CPT

## 2024-08-27 PROCEDURE — 97530 THERAPEUTIC ACTIVITIES: CPT

## 2024-08-27 PROCEDURE — 36415 COLL VENOUS BLD VENIPUNCTURE: CPT

## 2024-08-27 PROCEDURE — 82947 ASSAY GLUCOSE BLOOD QUANT: CPT

## 2024-08-27 PROCEDURE — 6360000002 HC RX W HCPCS

## 2024-08-27 PROCEDURE — 80048 BASIC METABOLIC PNL TOTAL CA: CPT

## 2024-08-27 RX ORDER — INSULIN LISPRO 100 [IU]/ML
0-4 INJECTION, SOLUTION INTRAVENOUS; SUBCUTANEOUS
Status: DISCONTINUED | OUTPATIENT
Start: 2024-08-27 | End: 2024-08-30 | Stop reason: HOSPADM

## 2024-08-27 RX ORDER — DEXTROSE MONOHYDRATE 100 MG/ML
INJECTION, SOLUTION INTRAVENOUS CONTINUOUS PRN
Status: DISCONTINUED | OUTPATIENT
Start: 2024-08-27 | End: 2024-08-30 | Stop reason: HOSPADM

## 2024-08-27 RX ORDER — INSULIN LISPRO 100 [IU]/ML
0-4 INJECTION, SOLUTION INTRAVENOUS; SUBCUTANEOUS NIGHTLY
Status: DISCONTINUED | OUTPATIENT
Start: 2024-08-27 | End: 2024-08-30 | Stop reason: HOSPADM

## 2024-08-27 RX ORDER — HYDROXYZINE HYDROCHLORIDE 10 MG/1
10 TABLET, FILM COATED ORAL ONCE
Status: CANCELLED | OUTPATIENT
Start: 2024-08-27

## 2024-08-27 RX ORDER — LABETALOL HYDROCHLORIDE 5 MG/ML
10 INJECTION, SOLUTION INTRAVENOUS
Status: DISCONTINUED | OUTPATIENT
Start: 2024-08-27 | End: 2024-08-29

## 2024-08-27 RX ORDER — LURASIDONE HYDROCHLORIDE 40 MG/1
40 TABLET, FILM COATED ORAL DAILY
Status: DISCONTINUED | OUTPATIENT
Start: 2024-08-27 | End: 2024-08-28

## 2024-08-27 RX ORDER — ALPRAZOLAM 0.5 MG
0.5 TABLET ORAL ONCE AS NEEDED
Status: COMPLETED | OUTPATIENT
Start: 2024-08-27 | End: 2024-08-27

## 2024-08-27 RX ORDER — GLUCAGON 1 MG/ML
1 KIT INJECTION PRN
Status: DISCONTINUED | OUTPATIENT
Start: 2024-08-27 | End: 2024-08-30 | Stop reason: HOSPADM

## 2024-08-27 RX ORDER — HYDROXYZINE HYDROCHLORIDE 10 MG/1
10 TABLET, FILM COATED ORAL ONCE
Status: COMPLETED | OUTPATIENT
Start: 2024-08-27 | End: 2024-08-27

## 2024-08-27 RX ORDER — LURASIDONE HYDROCHLORIDE 40 MG/1
40 TABLET, FILM COATED ORAL DAILY
COMMUNITY
Start: 2022-04-29

## 2024-08-27 RX ADMIN — POTASSIUM BICARBONATE 40 MEQ: 782 TABLET, EFFERVESCENT ORAL at 05:29

## 2024-08-27 RX ADMIN — ATORVASTATIN CALCIUM 40 MG: 80 TABLET, FILM COATED ORAL at 22:04

## 2024-08-27 RX ADMIN — ALPRAZOLAM 0.5 MG: 0.5 TABLET ORAL at 17:56

## 2024-08-27 RX ADMIN — PANTOPRAZOLE SODIUM 40 MG: 40 INJECTION, POWDER, FOR SOLUTION INTRAVENOUS at 08:46

## 2024-08-27 RX ADMIN — GABAPENTIN 400 MG: 400 CAPSULE ORAL at 00:08

## 2024-08-27 RX ADMIN — GABAPENTIN 400 MG: 400 CAPSULE ORAL at 22:04

## 2024-08-27 RX ADMIN — HEPARIN SODIUM 16 UNITS/KG/HR: 10000 INJECTION, SOLUTION INTRAVENOUS at 20:54

## 2024-08-27 RX ADMIN — CLOPIDOGREL BISULFATE 75 MG: 75 TABLET ORAL at 08:46

## 2024-08-27 RX ADMIN — ASPIRIN 81 MG: 81 TABLET, COATED ORAL at 08:46

## 2024-08-27 RX ADMIN — SODIUM CHLORIDE, PRESERVATIVE FREE 10 ML: 5 INJECTION INTRAVENOUS at 22:05

## 2024-08-27 RX ADMIN — HYDROXYZINE HYDROCHLORIDE 10 MG: 10 TABLET ORAL at 17:22

## 2024-08-27 ASSESSMENT — PAIN SCALES - GENERAL
PAINLEVEL_OUTOF10: 0

## 2024-08-27 NOTE — PROGRESS NOTES
Endovascular Neurosurgery Progress Note      Reason for evaluation: Right ICA occlusion     SUBJECTIVE:   NAEO.       History of Chief Complaint:      The patient is a 59 y.o. female HTN, smoker, TIAs, who presents as a transfer from outlying facility for transient left facial droop and left-sided weakness     Patient states that she noticed having shaking of her lower extremity for about 10 minutes and then noticed having left leg weakness for about an hour until subsided on its own, she states that she had TIAs in the past with left-sided weakness, should be taken aspirin but states that she has not been taking for the past week or so, she was seen at [facility] with an initial blood pressure of 200s, was given a dose of labetalol to draw blood pressure, stroke team were consulted.  CT head was unremarkable for acute abnormalities.  However did show hypodensity at the right frontal right parietal lobes, remote lacunar infarct at the right caudate head.  CTA head and neck per report was showing a right ICA stenosis with reconstitution from ACOM.  Decision was to start patient on IV heparin and he will receive a loading dose of aspirin 324 mg p.o. once prior to arrival.  Blood pressure over here was 180s.  Patient was noted to ambulate, NIH of 2 due to right inferior quadrantanopsia and slurred speech.  Did have some weakness at the lower extremity noted on exam.      Allergies  is allergic to amoxicillin, lisinopril, and ziprasidone hcl.  Medications  Prior to Admission medications    Medication Sig Start Date End Date Taking? Authorizing Provider   amLODIPine (NORVASC) 10 MG tablet Take 1 tablet by mouth daily 2/5/24  Yes ProviderCharley MD   aspirin 81 MG EC tablet Take 1 tablet by mouth daily   Yes Provider, MD Charley   atorvastatin (LIPITOR) 40 MG tablet Take 1 tablet by mouth nightly 6/24/24  Yes Provider, MD Charley   cetirizine (ZYRTEC) 10 MG tablet Take 1 tablet by mouth daily 2/5/24   (H) 2024     2024     2024    BUN 7 (L) 2024    CREATININE 0.8 2024    MG 2.1 2024    APTT 33.1 2024    INR 1.1 2024      No results found for: \"COVID19\"    RADIOLOGY:   Images were personally reviewed includin. Foci of acute infarcts in the right centrum semiovale measuring up to 5 mm   size. No mass effect or midline shift. No acute intracranial hemorrhage.   2. Occlusion of the right ICA at the ostium and throughout the cervical   course.   3. Occlusion of the intracranial right ICA with reconstitution of the carotid   terminus via cross circulation.     ASSESSMENT:     Violeta Hawk is a 59 y.o. 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 Nubia Goodman MD   Stroke, Neurocritical Care & Neurointervention  [facility]  [facility] Stroke [facility]  Electronically signed 2024 at 8:53 AM

## 2024-08-27 NOTE — PROGRESS NOTES
Daily Progress Note  Neuro Critical Care    Patient Name: Violeta Hawk  Patient : 1965  Room/Bed: 0115/0115-01  Code Status: Full code  Allergies:   Allergies   Allergen Reactions    Amoxicillin Nausea Only    Lisinopril Other (See Comments)     Other Reaction(s): muscle cramps    Muscle cramps    Ziprasidone Hcl Other (See Comments) and Rash     MUSCLE WEAKNESS IN FACE       CHIEF COMPLAINT:      Intermittent left arm and leg weakness concerns for TIA        INTERVAL HISTORY /   Initial Presentation (Admitted 2024):  59-year-old female with past medical history of hypertension, TIAs, presented after symptoms of left upper and lower extremity abrupt onset shaking.     Patient experienced symptoms of abrupt onset left upper and lower extremity shaking lasted for 1 to 2 minutes, followed by weakness on the left side that resolved.  This happened on Saturday, 2024 in the morning.  Her symptoms resolved, and she had another set of symptoms consisting of slurred speech and left facial droop on Monday, 2024.  At that time, EMS was called, and patient was transported to [facility].  She was found to be hypertensive with initial SBP's of 200s.  However, the CT head did show hypodensity in the right frontal and parietal lobes with remote lacunar infarcts at the right caudate head.  CTA head and neck shows right ICA stenosis with reconstitution from ACOM.  Patient was started on heparin IV and loading dose of aspirin 324 mg.  Patient was not a TNK candidate due to being outside of the TNK window.     She was transferred to [place] for close monitoring.  She was continued on triple therapy including IV heparin drip, aspirin and Plavix.  She does have a significant smoking history where she smokes 1 ppd for 42 years.   MRI brain w/o contrast showed foci of acute infarcts in right centrum semiovale up to 5 mm. No mass effect or midline shift.       Hospital Course:   2024: Admitted to the  cervical  course.  3. Occlusion of the intracranial right ICA with reconstitution of the carotid  terminus via cross circulation.  4. No significant stenosis of the left carotid artery by NASCET criteria.  5. No significant stenosis of the vertebral arteries.    Labs and Images reviewed with:  [x] Dr. Tigre Augustine   [] Dr. Marly Cassidy    [] Dr. Fei Wilson  [] Dr. Octavio Holden  [] There are no new interval images to review.     PHYSICAL EXAM       CONSTITUTIONAL:  Well developed, well nourished, alert and oriented x 3, in no acute distress. GCS 15. Nontoxic. Mild dysarthria (patient reports much improved from presentation). No aphasia   HEAD:  normocephalic, atraumatic    EYES:  PERRLA, EOMI.   ENT:  moist mucous membranes   NECK:  supple, symmetric   LUNGS:  Equal air entry bilaterally   CARDIOVASCULAR:  normal s1 / s2, RRR, distal pulses intact   ABDOMEN:  Soft, no rigidity   NEUROLOGIC:  Mental Status:  A & O x3,awake             Cranial Nerves:    cranial nerves II-XII are grossly intact    Motor Exam:    Drift:  absent  Tone:  normal    Motor exam is symmetrical 5 out of 5 all extremities bilaterally    Sensory:    Touch:    Right Upper Extremity:  normal  Left Upper Extremity:  normal  Right Lower Extremity:  normal  Left Lower Extremity:  normal    Deep Tendon Reflexes:    Right Bicep:  2+  Left Bicep:  2+  Right Knee:  2+  Left Knee:  2+    Plantar Response:  Right:  downgoing  Left:  downgoing    Clonus:  absent  Bowens's:  absent    Coordination/Dysmetria:  Heel to Shin:  Right:  normal  Left:  normal  Finger to Nose:   Right:  normal  Left:  normal   Dysdiadochokinesia:  absent    Gait:  Deffered     DRAINS:  [x] There are no drains for Neuro Critical Care to monitor at this time.     ASSESSMENT AND PLAN:       Assessment:   59 year old female, PMH of HTN, TIA, presents for L upper and lower extremity shaking. Imaging revealed acute infract of R centrum semiovale; on triple therapy

## 2024-08-27 NOTE — PLAN OF CARE
Problem: Discharge Planning  Goal: Discharge to home or other facility with appropriate resources  Outcome: Progressing  Flowsheets (Taken 8/27/2024 0017)  Discharge to home or other facility with appropriate resources:   Identify barriers to discharge with patient and caregiver   Identify discharge learning needs (meds, wound care, etc)   Arrange for needed discharge resources and transportation as appropriate   Refer to discharge planning if patient needs post-hospital services based on physician order or complex needs related to functional status, cognitive ability or social support system     Problem: Pain  Goal: Verbalizes/displays adequate comfort level or baseline comfort level  Outcome: Progressing  Flowsheets (Taken 8/27/2024 0017)  Verbalizes/displays adequate comfort level or baseline comfort level:   Encourage patient to monitor pain and request assistance   Assess pain using appropriate pain scale   Administer analgesics based on type and severity of pain and evaluate response   Implement non-pharmacological measures as appropriate and evaluate response     Problem: Safety - Adult  Goal: Free from fall injury  Outcome: Progressing     Problem: ABCDS Injury Assessment  Goal: Absence of physical injury  Outcome: Progressing  Flowsheets (Taken 8/27/2024 0017)  Absence of Physical Injury: Implement safety measures based on patient assessment

## 2024-08-27 NOTE — PROGRESS NOTES
Patient Anti-Xa from 1230 not drawn by lab. RN called Phlebotomy number and Phlebotomy manager with no answer. Main Lab looking into it. STAT orders for Anti-Xa place. Will follow up.

## 2024-08-27 NOTE — H&P
Neuro ICU History & Physical    Patient Name: Violeta Hawk  Patient : 1965  Room/Bed:   Code Status: FULL   Allergies:   Allergies   Allergen Reactions    Amoxicillin Nausea Only    Lisinopril Other (See Comments)     Other Reaction(s): muscle cramps    Muscle cramps    Ziprasidone Hcl Other (See Comments) and Rash     MUSCLE WEAKNESS IN FACE       CHIEF COMPLAINT     Intermittent left arm and leg weakness concerns for TIA     HPI    History Obtained From: Patient and EMR.    59-year-old female with past medical history of hypertension, TIAs, presented after symptoms of left upper and lower extremity abrupt onset shaking.    Patient experienced symptoms of abrupt onset left upper and lower extremity shaking lasted for 1 to 2 minutes, followed by weakness on the left side that resolved.  This happened on Saturday, 2024 in the morning.  Her symptoms resolved, and she had another set of symptoms consisting of slurred speech and left facial droop on Monday, 2024.  At that time, EMS was called, and patient was transported to [facility].  She was found to be hypertensive with initial SBP's of 200s.  CT head without IV contrast was unremarkable.  However, the CT head did show hypodensity in the right frontal and parietal lobes with remote lacunar infarcts at the right caudate head.  CTA head and neck shows right ICA stenosis with reconstitution from ACOM.  Patient was started on heparin IV and loading dose of aspirin 324 mg.  Patient was not a TNK candidate due to being outside of the TNK window.    She was transferred to [place] for close monitoring.  She was continued on triple therapy including IV heparin drip, aspirin and Plavix.  She does have a significant smoking history where she smokes 1 ppd for 42 years.   MRI brain w/o contrast showed foci of acute infarcts in right centrum semiovale up to 5 mm. No mass effect or midline shift.     Admitted to ICU From: ER  Reason for ICU  nourished, alert and oriented x 3, in no acute distress. GCS 15. Nontoxic. No dysarthria. No aphasia.   HEAD:  normocephalic, atraumatic    EYES:  PERRLA, EOMI.   ENT:  moist mucous membranes   LUNGS:  Equal air entry bilaterally   CARDIOVASCULAR:  normal s1 / s2   ABDOMEN:  Soft, no rigidity   NECK supple, symmetric, no midline tenderness to palpation    BACK without midline tenderness, step-offs or deformities    EXTREMITIES Normal ROM with no deformities   NEUROLOGIC:  Mental Status:  A & O x3,awake             Cranial Nerves:    cranial nerves II-XII are grossly intact    Motor Exam:    Drift:  absent  Tone:  normal    Motor exam is symmetrical 5 out of 5 all extremities bilaterally    Sensory:    Touch:    Right Upper Extremity:  normal  Left Upper Extremity:  normal  Right Lower Extremity:  normal  Left Lower Extremity:  normal    Deep Tendon Reflexes:    Right Bicep:  2+  Left Bicep:  2+  Right Knee:  2+  Left Knee:  2+    Plantar Response:  Right:  downgoing  Left:  downgoing    Clonus:  absent  Bowens's:  absent    Coordination/Dysmetria:  Heel to Shin:  Right:  normal  Left:  normal  Finger to Nose:   Right:  normal  Left:  normal   Dysdiadochokinesia:  absent    Gait:  Deferred.    SKIN No obvious ecchymosis, rashes, or lesions        LABS AND IMAGING:     RECENT LABS:  CBC with Differential:    Lab Results   Component Value Date/Time    WBC 16.5 08/26/2024 03:43 PM    RBC 4.58 08/26/2024 03:43 PM    HGB 13.7 08/26/2024 03:43 PM    HCT 39.9 08/26/2024 03:43 PM     08/26/2024 03:43 PM    MCV 87.1 08/26/2024 03:43 PM    MCH 29.9 08/26/2024 03:43 PM    MCHC 34.3 08/26/2024 03:43 PM    RDW 14.5 08/26/2024 03:43 PM    LYMPHOPCT 20 08/26/2024 02:03 PM    MONOPCT 5 08/26/2024 02:03 PM    EOSPCT 1 08/26/2024 02:03 PM    BASOPCT 0 08/26/2024 02:03 PM    MONOSABS 0.73 08/26/2024 02:03 PM    LYMPHSABS 3.10 08/26/2024 02:03 PM    EOSABS 0.17 08/26/2024 02:03 PM    BASOSABS 0.07 08/26/2024 02:03 PM     BMP:

## 2024-08-27 NOTE — PROGRESS NOTES
Physical Therapy  Facility/Department: [address] NEURO ICU  Physical Therapy Initial Assessment    Name: Violeta Hawk  : 1965  MRN: [id number]  Date of Service: 2024    Discharge Recommendations: No therapy recommended at discharge.    Chief Complaint   Patient presents with    Aphasia    Facial Droop     Violeta Hawk is a 59 y.o. female HTN, smoker, TIAs, [facility] droop and left-sided weakness       PT Equipment Recommendations  Equipment Needed: No      Patient Diagnosis(es): The encounter diagnosis was Occlusion and stenosis of cerebral artery.  Past Medical History:  has no past medical history on file.  Past Surgical History:  has no past surgical history on file.    Assessment  Assessment: Pt ambulates 300 ft with no AD independently. pt should be safe to return to prior living situation with intermittent support as needed. pt educated on d/c PT, agreeable.  Therapy Prognosis: Good  Decision Making: Low Complexity  Barriers to Learning: none  Requires PT Follow-Up: No  Activity Tolerance  Activity Tolerance: Patient tolerated treatment well    Plan  Physical Therapy Plan  General Plan:  (d/c PT)  Safety Devices  Type of Devices: All fall risk precautions in place, Call light within reach, Left in bed, Nurse notified, Gait belt  Restraints  Restraints Initially in Place: No    Restrictions  Restrictions/Precautions  Restrictions/Precautions: Up as Tolerated  Required Braces or Orthoses?: No     Subjective  General  Patient assessed for rehabilitation services?: Yes  Response To Previous Treatment: Not applicable  Family / Caregiver Present: No  Follows Commands: Within Functional Limits  Subjective  Subjective: RN and pt agreeable to PT. pt agreeable and pleasant. pt supine in bed at start of session, c/o no pain.         Social/Functional History  Social/Functional History  Lives With: Alone  Type of Home: Apartment  Home Layout: One  level  Home Access: Level entry  Bathroom Shower/Tub: Tub/Shower unit  Bathroom Toilet: Handicap height  Bathroom Equipment: Grab bars in shower  Home Equipment:  (no DME at baseline)  Receives Help From: Friend(s)  ADL Assistance: Independent  Homemaking Assistance: Independent  Homemaking Responsibilities: Yes  Ambulation Assistance: Independent  Transfer Assistance: Independent  Active : No  Patient's  Info:  drives  Occupation: On disability  Leisure & Hobbies: shopping  Additional Comments: Pt reporting that friends can provide PRN support  Vision/Hearing  Vision  Vision: Impaired  Vision Exceptions: Wears glasses at all times  Hearing  Hearing: Within functional limits    Cognition   Orientation  Overall Orientation Status: Within Functional Limits  Cognition  Overall Cognitive Status: WFL         Gross Assessment  Sensation: Intact (pt denies n/t)    AROM RLE (degrees)  RLE AROM: WFL  AROM LLE (degrees)  LLE AROM : WFL  AROM RUE (degrees)  RUE AROM : WFL  AROM LUE (degrees)  LUE AROM : WFL  Strength RLE  Strength RLE: WFL  Strength LLE  Strength LLE: WFL  Strength RUE  Strength RUE: WFL  Strength LUE  Strength LUE: WFL           Bed mobility  Supine to Sit: Modified independent  Sit to Supine: Modified independent  Scooting: Independent  Bed Mobility Comments: HOB elevated  Transfers  Sit to Stand: Independent  Stand to Sit: Independent  Comment: no AD used  Ambulation  Surface: Level tile  Device: No Device  Assistance: Independent  Gait Deviations: Slow [place];Decreased step length;Decreased step height  Distance: 300 ft  Comments: no LOB noted.  More Ambulation?: No  Stairs/Curb  Stairs?: No     Balance  Posture: Good  Sitting - Static: Good  Sitting - Dynamic: Good  Standing - Static: Good  Standing - Dynamic: Good  Comments: Assessed without AD                   AM-PAC - Mobility    AM-PAC Basic Mobility - Inpatient   How much help is needed turning from your back to your side  while in a flat bed without using bedrails?: None  How much help is needed moving from lying on your back to sitting on the side of a flat bed without using bedrails?: None  How much help is needed moving to and from a bed to a chair?: None  How much help is needed standing up from a chair using your arms?: None  How much help is needed walking in hospital room?: None  How much help is needed climbing 3-5 steps with a railing?: None  AM-PAC Inpatient Mobility Raw Score : 24  AM-PAC Inpatient T-Scale Score : 61.14  Mobility Inpatient CMS 0-100% Score: 0  Mobility Inpatient CMS G-Code Modifier : CH            Goals  Short Term Goals  Time Frame for Short Term Goals: d/c PT       Education  Patient Education  Education Given To: Patient  Education Provided: Role of Therapy;Plan of Care  Education Method: Verbal  Barriers to Learning: None  Education Outcome: Verbalized understanding;Demonstrated understanding      Therapy Time   Individual Concurrent Group Co-treatment   Time In 1355         Time Out 1414         Minutes 19         Timed Code Treatment Minutes: 15 Minutes       Meghan Daly, PT

## 2024-08-27 NOTE — CARE COORDINATION
PHQ-9   Met with pt this date was awake and alert.  Pt lives alone. Has 1 dtr. Pt also states she has 1 sister.  Pt denies having any SI/depression      Patient Health Questionnaire-9 (PHQ-9)    Over the last 2 weeks, how often have you been bothered by any of the following problems?    1. Little Interest or pleasure in doing things?   [x] Not at all  [] Several Days  [] More than half the day  []  Nearly every day    2. Feeling down, depressed or hopeless?    [x] Not at all  [] Several Days  [] More than half the day  []  Nearly every day    3. Trouble falling or staying asleep, or sleeping too much?   [] Not at all  [x] Several Days  [] More than half the day  []  Nearly every day    4. Feeling tired or having little energy?   [x] Not at all  [] Several Days  [] More than half the day  []  Nearly every day    5. Poor apettite or overeating?   [x] Not at all  [] Several Days  [] More than half the day  []  Nearly every day    6. Feeling bad about yourself-or that you are a failure or have let yourself or your family down?   [x] Not at all  [] Several Days  [] More than half the day  []  Nearly every day    7. Trouble concentrating on things, such as reading the newspaper or watching television?   [x] Not at all  [] Several Days  [] More than half the day  []  Nearly every day    8. Moving or speaking so slowly that other people could have noticed? Or the opposite-being so fidgety or restless that you have been moving around a lot more than usual?   [x] Not at all  [] Several Days  [] More than half the day  []  Nearly every day    9. Thoughts that you would be better off dead or of hurting yourself in some way?   [x] Not at all  [] Several Days  [] More than half the day  []  Nearly every day    Total Score: 1    If you checked off any problems, how difficult have these problems made it for you to do your work, take care of things at home, or get along with other people?   [x] Not difficult at all  [] Somewhat

## 2024-08-27 NOTE — PROGRESS NOTES
SLP ALL NOTES  Facility/Department: [address] NEURO ICU  Initial Speech/Language/Cognitive Assessment    NAME: Violeta Hawk  : 1965   MRN: [id number]  ADMISSION DATE: 2024  ADMITTING DIAGNOSIS: has ICAO (internal carotid artery occlusion), right and Occlusion and stenosis of cerebral artery on their problem list.      Date of Eval: 2024   Evaluating Therapist: LONI Joseph    RECENT RESULTS  CT OF HEAD/MRI: IMPRESSION:  1. Foci of acute infarcts in the right centrum semiovale measuring up to 5 mm  size. No mass effect or midline shift. No acute intracranial hemorrhage.  2. Occlusion of the right ICA at the ostium and throughout the cervical  course.  3. Occlusion of the intracranial right ICA with reconstitution of the carotid  terminus via cross circulation.  4. No significant stenosis of the left carotid artery by NASCET criteria.  5. No significant stenosis of the vertebral arteries.    Primary Complaint: 59-year-old female with past medical history of hypertension, TIAs, presented after symptoms of left upper and lower extremity abrupt onset shaking.     Patient experienced symptoms of abrupt onset left upper and lower extremity shaking lasted for 1 to 2 minutes, followed by weakness on the left side that resolved.  This happened on Saturday, 2024 in the morning.  Her symptoms resolved, and she had another set of symptoms consisting of slurred speech and left facial droop on Monday, 2024.  At that time, EMS was called, and patient was transported to [facility].  She was found to be hypertensive with initial SBP's of 200s.  CT head without IV contrast was unremarkable.  However, the CT head did show hypodensity in the right frontal and parietal lobes with remote lacunar infarcts at the right caudate head.  CTA head and neck shows right ICA stenosis with reconstitution from ACOM.  Patient was started on heparin IV and loading dose of aspirin 324 mg.  Patient was not a TNK

## 2024-08-28 LAB
ANION GAP SERPL CALCULATED.3IONS-SCNC: 13 MMOL/L (ref 9–16)
ANTI-XA UNFRAC HEPARIN: 0.21 IU/L
ANTI-XA UNFRAC HEPARIN: 0.27 IU/L
ANTI-XA UNFRAC HEPARIN: 0.31 IU/L
ANTI-XA UNFRAC HEPARIN: 0.51 IU/L
BUN SERPL-MCNC: 11 MG/DL (ref 8–23)
CALCIUM SERPL-MCNC: 10.3 MG/DL (ref 8.6–10.4)
CHLORIDE SERPL-SCNC: 102 MMOL/L (ref 98–107)
CO2 SERPL-SCNC: 23 MMOL/L (ref 20–31)
CREAT SERPL-MCNC: 1 MG/DL (ref 0.5–0.9)
ERYTHROCYTE [DISTWIDTH] IN BLOOD BY AUTOMATED COUNT: 14.6 % (ref 11.8–14.4)
GFR, ESTIMATED: 69 ML/MIN/1.73M2
GLUCOSE BLD-MCNC: 181 MG/DL (ref 65–105)
GLUCOSE SERPL-MCNC: 146 MG/DL (ref 74–99)
HCT VFR BLD AUTO: 43.4 % (ref 36.3–47.1)
HGB BLD-MCNC: 14.5 G/DL (ref 11.9–15.1)
MCH RBC QN AUTO: 29.7 PG (ref 25.2–33.5)
MCHC RBC AUTO-ENTMCNC: 33.4 G/DL (ref 28.4–34.8)
MCV RBC AUTO: 88.8 FL (ref 82.6–102.9)
NRBC BLD-RTO: 0 PER 100 WBC
PLATELET # BLD AUTO: 340 K/UL (ref 138–453)
PMV BLD AUTO: 10.2 FL (ref 8.1–13.5)
POTASSIUM SERPL-SCNC: 3.6 MMOL/L (ref 3.7–5.3)
RBC # BLD AUTO: 4.89 M/UL (ref 3.95–5.11)
SODIUM SERPL-SCNC: 138 MMOL/L (ref 136–145)
WBC OTHER # BLD: 15.5 K/UL (ref 3.5–11.3)

## 2024-08-28 PROCEDURE — 94761 N-INVAS EAR/PLS OXIMETRY MLT: CPT

## 2024-08-28 PROCEDURE — 36415 COLL VENOUS BLD VENIPUNCTURE: CPT

## 2024-08-28 PROCEDURE — 97129 THER IVNTJ 1ST 15 MIN: CPT

## 2024-08-28 PROCEDURE — 85027 COMPLETE CBC AUTOMATED: CPT

## 2024-08-28 PROCEDURE — 6370000000 HC RX 637 (ALT 250 FOR IP): Performed by: NURSE PRACTITIONER

## 2024-08-28 PROCEDURE — 6370000000 HC RX 637 (ALT 250 FOR IP)

## 2024-08-28 PROCEDURE — 2000000000 HC ICU R&B

## 2024-08-28 PROCEDURE — 80048 BASIC METABOLIC PNL TOTAL CA: CPT

## 2024-08-28 PROCEDURE — 85520 HEPARIN ASSAY: CPT

## 2024-08-28 PROCEDURE — 82947 ASSAY GLUCOSE BLOOD QUANT: CPT

## 2024-08-28 PROCEDURE — 97130 THER IVNTJ EA ADDL 15 MIN: CPT

## 2024-08-28 PROCEDURE — 2580000003 HC RX 258

## 2024-08-28 PROCEDURE — 99233 SBSQ HOSP IP/OBS HIGH 50: CPT | Performed by: STUDENT IN AN ORGANIZED HEALTH CARE EDUCATION/TRAINING PROGRAM

## 2024-08-28 RX ORDER — CLONIDINE HYDROCHLORIDE 0.1 MG/1
0.1 TABLET ORAL DAILY PRN
COMMUNITY
Start: 2024-04-04

## 2024-08-28 RX ORDER — POTASSIUM CHLORIDE 1500 MG/1
40 TABLET, EXTENDED RELEASE ORAL ONCE
Status: COMPLETED | OUTPATIENT
Start: 2024-08-28 | End: 2024-08-28

## 2024-08-28 RX ADMIN — SODIUM CHLORIDE, PRESERVATIVE FREE 10 ML: 5 INJECTION INTRAVENOUS at 21:36

## 2024-08-28 RX ADMIN — GABAPENTIN 400 MG: 400 CAPSULE ORAL at 13:42

## 2024-08-28 RX ADMIN — ASPIRIN 81 MG: 81 TABLET, COATED ORAL at 08:14

## 2024-08-28 RX ADMIN — ATORVASTATIN CALCIUM 40 MG: 80 TABLET, FILM COATED ORAL at 21:36

## 2024-08-28 RX ADMIN — POTASSIUM CHLORIDE 40 MEQ: 1500 TABLET, EXTENDED RELEASE ORAL at 06:28

## 2024-08-28 RX ADMIN — GABAPENTIN 400 MG: 400 CAPSULE ORAL at 21:36

## 2024-08-28 RX ADMIN — CLOPIDOGREL BISULFATE 75 MG: 75 TABLET ORAL at 08:14

## 2024-08-28 ASSESSMENT — PAIN SCALES - GENERAL
PAINLEVEL_OUTOF10: 0

## 2024-08-28 NOTE — PROGRESS NOTES
Daily Progress Note  Neuro Critical Care    Patient Name: Violeta Hawk  Patient : 1965  Room/Bed: 0115/0115-01  Code Status: Full code  Allergies:   Allergies   Allergen Reactions    Amoxicillin Nausea Only    Lisinopril Other (See Comments)     Other Reaction(s): muscle cramps    Muscle cramps    Ziprasidone Hcl Other (See Comments) and Rash     MUSCLE WEAKNESS IN FACE       CHIEF COMPLAINT:      Intermittent left arm and leg weakness concerns for TIA        INTERVAL HISTORY /   Initial Presentation (Admitted 2024):  59-year-old female with past medical history of hypertension, TIAs, presented after symptoms of left upper and lower extremity abrupt onset shaking.     Patient experienced symptoms of abrupt onset left upper and lower extremity shaking lasted for 1 to 2 minutes, followed by weakness on the left side that resolved.  This happened on Saturday, 2024 in the morning.  Her symptoms resolved, and she had another set of symptoms consisting of slurred speech and left facial droop on Monday, 2024.  At that time, EMS was called, and patient was transported to [facility].  She was found to be hypertensive with initial SBP's of 200s.  However, the CT head did show hypodensity in the right frontal and parietal lobes with remote lacunar infarcts at the right caudate head.  CTA head and neck shows right ICA stenosis with reconstitution from ACOM.  Patient was started on heparin IV and loading dose of aspirin 324 mg.  Patient was not a TNK candidate due to being outside of the TNK window.     She was transferred to [place] for close monitoring.  She was continued on triple therapy including IV heparin drip, aspirin and Plavix.  She does have a significant smoking history where she smokes 1 ppd for 42 years.   MRI brain w/o contrast showed foci of acute infarcts in right centrum semiovale up to 5 mm. No mass effect or midline shift.       Hospital Course:   2024: Admitted to the  endovascular decision      CARDIOVASCULAR:  - Continue telemetry    PULMONARY:  - On room air    RENAL/FLUID/ELECTROLYTE:  - BUN 11/ Creatinine 1.0  - Urine output 0.4 ml/kg/hr (however, no urine output documented after 1900. Patient does report that she has urinated after 1900 multiple times)  - Replace electrolytes PRN  - Daily BMP    GI/NUTRITION:  NUTRITION:  ADULT DIET; Regular  - Bowel regimen: Glycolax PRN  - GI prophylaxis:     ID:  - Tmax 36.7  - WBC 15.5 (down trending, was 17.2 yesterday)  - Continue to monitor for fevers  - Daily CBC  -UA pending     HEME:   - Platelets 340  - Daily CBC    ENDOCRINE:  - Continue to monitor blood glucose, goal <180    OTHER:  - PT/OT/ST   - Code Status: Full    PROPHYLAXIS:  Stress ulcer: PPI    DVT PROPHYLAXIS:  - SCD sleeves - Knee High   -Heparin gtt      DISPOSITION:  [x] To remain ICU: On heparin gtt    [] OK for out of ICU from Neuro Critical Care standpoint    We will continue to follow along.     For any changes in exam or patient status please contact Neuro Critical Care.      Paco Aguirre MD  Neuro Critical Care  Pager [phone]  8/28/2024     1:05 PM

## 2024-08-28 NOTE — PROGRESS NOTES
SLP ALL NOTES  Speech Language Pathology  [facility]    Cognitive Treatment Note    Date: 8/28/2024  Patient’s Name: Violeta Hawk  MRN: [id number]  Diagnosis:   Patient Active Problem List   Diagnosis Code    ICAO (internal carotid artery occlusion), right I65.21    Occlusion and stenosis of cerebral artery I66.9    Cerebrovascular accident (CVA) due to occlusion of right carotid artery (HCC) I63.231       Pain: 0/10    Cognitive Treatment    Treatment time: 900-923      Subjective: [x] Alert [x] Cooperative     [] Confused     [] Agitated    [] Lethargic      Objective/Assessment:  Attention: WFL    Orientation: WFL    Recall: 2/3,2/3,2/3 independent increased to 3/3,3/3,3/3 with mild cues    Organization: Verbal sequencing 5/5 independent    Problem Solving/Reasoning: For safety 5/5 independent.  Word generation 6/16 increased to 12/16 with mod cues     Other: pt reports she may have surgery today, wanting to hear about it, wanting to know the plan. Pt cooperative and conversant    Plan:  [x] Continue ST services    [] Discharge from ST:      Discharge recommendations: []  Further therapy recommended at discharge.The patient should be able to tolerate at least 3 hours of therapy per day over 5 days or 15 hours over 7 days. [x] Further therapy recommended at discharge.   [] No therapy recommended at discharge.          Treatment completed by: Kristyn Goldsmith, SLP, M.S. CCC-SLP

## 2024-08-28 NOTE — PROGRESS NOTES
Endovascular Neurosurgery Progress Note      Reason for evaluation: Right ICA occlusion     SUBJECTIVE:   NAEO.       History of Chief Complaint:      The patient is a 59 y.o. female HTN, smoker, TIAs, who presents as a transfer from outlying facility for transient left facial droop and left-sided weakness     Patient states that she noticed having shaking of her lower extremity for about 10 minutes and then noticed having left leg weakness for about an hour until subsided on its own, she states that she had TIAs in the past with left-sided weakness, should be taken aspirin but states that she has not been taking for the past week or so, she was seen at [facility] with an initial blood pressure of 200s, was given a dose of labetalol to draw blood pressure, stroke team were consulted.  CT head was unremarkable for acute abnormalities.  However did show hypodensity at the right frontal right parietal lobes, remote lacunar infarct at the right caudate head.  CTA head and neck per report was showing a right ICA stenosis with reconstitution from ACOM.  Decision was to start patient on IV heparin and he will receive a loading dose of aspirin 324 mg p.o. once prior to arrival.  Blood pressure over here was 180s.  Patient was noted to ambulate, NIH of 2 due to right inferior quadrantanopsia and slurred speech.  Did have some weakness at the lower extremity noted on exam.      Allergies  is allergic to amoxicillin, lisinopril, and ziprasidone hcl.  Medications  Prior to Admission medications    Medication Sig Start Date End Date Taking? Authorizing Provider   cloNIDine (CATAPRES) 0.1 MG tablet Take 1 tablet by mouth daily as needed for High Blood Pressure 4/4/24  Yes Provider, MD Charley   lurasidone (LATUDA) 40 MG TABS tablet Take 1 tablet by mouth daily 4/29/22  Yes Provider, MD Charley   amLODIPine (NORVASC) 10 MG tablet Take 1 tablet by mouth daily 2/5/24  Yes Provider, MD Charley   aspirin 81 MG EC

## 2024-08-28 NOTE — PROGRESS NOTES
Occupational Therapy    [facility]  Occupational Therapy Not Seen Note    DATE: 2024    NAME: Violeta Hawk  MRN: [id number]   : 1965      Patient not seen this date for Occupational Therapy due to:    Patient independent with ADLs and functional tasks with no acute OT needs. Will defer OT evaluation at this time. Please reorder OT if future needs arise.     Next Scheduled Treatment: N/A    Electronically signed by Elio Huang OT on 2024 at 8:53 AM

## 2024-08-29 ENCOUNTER — APPOINTMENT (OUTPATIENT)
Dept: INTERVENTIONAL RADIOLOGY/VASCULAR | Age: 59
End: 2024-08-29
Payer: MEDICARE

## 2024-08-29 LAB
ANION GAP SERPL CALCULATED.3IONS-SCNC: 13 MMOL/L (ref 9–16)
ANTI-XA UNFRAC HEPARIN: 0.13 IU/L
ANTI-XA UNFRAC HEPARIN: <0.1 IU/L
BUN SERPL-MCNC: 12 MG/DL (ref 8–23)
CALCIUM SERPL-MCNC: 10.1 MG/DL (ref 8.6–10.4)
CHLORIDE SERPL-SCNC: 100 MMOL/L (ref 98–107)
CO2 SERPL-SCNC: 25 MMOL/L (ref 20–31)
CREAT SERPL-MCNC: 0.9 MG/DL (ref 0.5–0.9)
EKG ATRIAL RATE: 80 BPM
EKG P AXIS: 60 DEGREES
EKG P-R INTERVAL: 104 MS
EKG Q-T INTERVAL: 412 MS
EKG QRS DURATION: 86 MS
EKG QTC CALCULATION (BAZETT): 475 MS
EKG R AXIS: 8 DEGREES
EKG T AXIS: 47 DEGREES
EKG VENTRICULAR RATE: 80 BPM
ERYTHROCYTE [DISTWIDTH] IN BLOOD BY AUTOMATED COUNT: 14.5 % (ref 11.8–14.4)
GFR, ESTIMATED: 69 ML/MIN/1.73M2
GLUCOSE BLD-MCNC: 243 MG/DL (ref 65–105)
GLUCOSE SERPL-MCNC: 193 MG/DL (ref 74–99)
HCT VFR BLD AUTO: 43 % (ref 36.3–47.1)
HGB BLD-MCNC: 14.1 G/DL (ref 11.9–15.1)
MAGNESIUM SERPL-MCNC: 2.3 MG/DL (ref 1.6–2.4)
MCH RBC QN AUTO: 29.3 PG (ref 25.2–33.5)
MCHC RBC AUTO-ENTMCNC: 32.8 G/DL (ref 28.4–34.8)
MCV RBC AUTO: 89.2 FL (ref 82.6–102.9)
NRBC BLD-RTO: 0 PER 100 WBC
PLATELET # BLD AUTO: 362 K/UL (ref 138–453)
PMV BLD AUTO: 10.2 FL (ref 8.1–13.5)
POTASSIUM SERPL-SCNC: 3.2 MMOL/L (ref 3.7–5.3)
RBC # BLD AUTO: 4.82 M/UL (ref 3.95–5.11)
SODIUM SERPL-SCNC: 138 MMOL/L (ref 136–145)
WBC OTHER # BLD: 13.4 K/UL (ref 3.5–11.3)

## 2024-08-29 PROCEDURE — 99153 MOD SED SAME PHYS/QHP EA: CPT

## 2024-08-29 PROCEDURE — 83735 ASSAY OF MAGNESIUM: CPT

## 2024-08-29 PROCEDURE — 6370000000 HC RX 637 (ALT 250 FOR IP)

## 2024-08-29 PROCEDURE — 2709999900 IR ANGIOGRAM CAROTID CEREBRAL BILATERAL

## 2024-08-29 PROCEDURE — 6370000000 HC RX 637 (ALT 250 FOR IP): Performed by: REGISTERED NURSE

## 2024-08-29 PROCEDURE — 6360000004 HC RX CONTRAST MEDICATION: Performed by: STUDENT IN AN ORGANIZED HEALTH CARE EDUCATION/TRAINING PROGRAM

## 2024-08-29 PROCEDURE — 80048 BASIC METABOLIC PNL TOTAL CA: CPT

## 2024-08-29 PROCEDURE — 6360000002 HC RX W HCPCS: Performed by: NURSE PRACTITIONER

## 2024-08-29 PROCEDURE — 85027 COMPLETE CBC AUTOMATED: CPT

## 2024-08-29 PROCEDURE — 6360000002 HC RX W HCPCS: Performed by: STUDENT IN AN ORGANIZED HEALTH CARE EDUCATION/TRAINING PROGRAM

## 2024-08-29 PROCEDURE — 2000000000 HC ICU R&B

## 2024-08-29 PROCEDURE — 85520 HEPARIN ASSAY: CPT

## 2024-08-29 PROCEDURE — 2580000003 HC RX 258: Performed by: STUDENT IN AN ORGANIZED HEALTH CARE EDUCATION/TRAINING PROGRAM

## 2024-08-29 PROCEDURE — 36223 PLACE CATH CAROTID/INOM ART: CPT

## 2024-08-29 PROCEDURE — 94761 N-INVAS EAR/PLS OXIMETRY MLT: CPT

## 2024-08-29 PROCEDURE — 2580000003 HC RX 258

## 2024-08-29 PROCEDURE — 36415 COLL VENOUS BLD VENIPUNCTURE: CPT

## 2024-08-29 PROCEDURE — 99152 MOD SED SAME PHYS/QHP 5/>YRS: CPT

## 2024-08-29 PROCEDURE — 82947 ASSAY GLUCOSE BLOOD QUANT: CPT

## 2024-08-29 PROCEDURE — 36226 PLACE CATH VERTEBRAL ART: CPT

## 2024-08-29 PROCEDURE — 6360000002 HC RX W HCPCS

## 2024-08-29 PROCEDURE — 99233 SBSQ HOSP IP/OBS HIGH 50: CPT | Performed by: STUDENT IN AN ORGANIZED HEALTH CARE EDUCATION/TRAINING PROGRAM

## 2024-08-29 RX ORDER — ALPRAZOLAM 0.5 MG
0.5 TABLET ORAL
Status: COMPLETED | OUTPATIENT
Start: 2024-08-29 | End: 2024-08-29

## 2024-08-29 RX ORDER — MIDAZOLAM HYDROCHLORIDE 2 MG/2ML
INJECTION, SOLUTION INTRAMUSCULAR; INTRAVENOUS PRN
Status: COMPLETED | OUTPATIENT
Start: 2024-08-29 | End: 2024-08-29

## 2024-08-29 RX ORDER — LABETALOL HYDROCHLORIDE 5 MG/ML
10 INJECTION, SOLUTION INTRAVENOUS
Status: DISCONTINUED | OUTPATIENT
Start: 2024-08-29 | End: 2024-08-30 | Stop reason: HOSPADM

## 2024-08-29 RX ORDER — LANOLIN ALCOHOL/MO/W.PET/CERES
3 CREAM (GRAM) TOPICAL NIGHTLY PRN
Status: DISCONTINUED | OUTPATIENT
Start: 2024-08-29 | End: 2024-08-30 | Stop reason: HOSPADM

## 2024-08-29 RX ORDER — ALPRAZOLAM 0.5 MG
0.5 TABLET ORAL ONCE
Status: COMPLETED | OUTPATIENT
Start: 2024-08-29 | End: 2024-08-29

## 2024-08-29 RX ORDER — IODIXANOL 270 MG/ML
100 INJECTION, SOLUTION INTRAVASCULAR
Status: COMPLETED | OUTPATIENT
Start: 2024-08-29 | End: 2024-08-29

## 2024-08-29 RX ORDER — HEPARIN SODIUM 1000 [USP'U]/ML
INJECTION, SOLUTION INTRAVENOUS; SUBCUTANEOUS PRN
Status: COMPLETED | OUTPATIENT
Start: 2024-08-29 | End: 2024-08-29

## 2024-08-29 RX ORDER — FENTANYL CITRATE 50 UG/ML
INJECTION, SOLUTION INTRAMUSCULAR; INTRAVENOUS PRN
Status: COMPLETED | OUTPATIENT
Start: 2024-08-29 | End: 2024-08-29

## 2024-08-29 RX ORDER — LABETALOL HYDROCHLORIDE 5 MG/ML
INJECTION, SOLUTION INTRAVENOUS PRN
Status: COMPLETED | OUTPATIENT
Start: 2024-08-29 | End: 2024-08-29

## 2024-08-29 RX ORDER — POTASSIUM CHLORIDE 1500 MG/1
40 TABLET, EXTENDED RELEASE ORAL ONCE
Status: COMPLETED | OUTPATIENT
Start: 2024-08-29 | End: 2024-08-29

## 2024-08-29 RX ADMIN — LABETALOL HYDROCHLORIDE 10 MG: 5 INJECTION, SOLUTION INTRAVENOUS at 20:20

## 2024-08-29 RX ADMIN — HEPARIN SODIUM 2000 UNITS: 1000 INJECTION, SOLUTION INTRAVENOUS; SUBCUTANEOUS at 14:11

## 2024-08-29 RX ADMIN — ASPIRIN 81 MG: 81 TABLET, COATED ORAL at 07:46

## 2024-08-29 RX ADMIN — CLOPIDOGREL BISULFATE 75 MG: 75 TABLET ORAL at 07:46

## 2024-08-29 RX ADMIN — POTASSIUM CHLORIDE 40 MEQ: 1500 TABLET, EXTENDED RELEASE ORAL at 18:44

## 2024-08-29 RX ADMIN — IODIXANOL 60 ML: 270 INJECTION, SOLUTION INTRAVASCULAR at 15:01

## 2024-08-29 RX ADMIN — MIDAZOLAM HYDROCHLORIDE 1 MG: 1 INJECTION, SOLUTION INTRAMUSCULAR; INTRAVENOUS at 14:10

## 2024-08-29 RX ADMIN — GABAPENTIN 400 MG: 400 CAPSULE ORAL at 20:20

## 2024-08-29 RX ADMIN — LABETALOL HYDROCHLORIDE 10 MG: 5 INJECTION, SOLUTION INTRAVENOUS at 01:10

## 2024-08-29 RX ADMIN — ALPRAZOLAM 0.5 MG: 0.5 TABLET ORAL at 21:12

## 2024-08-29 RX ADMIN — FENTANYL CITRATE 100 MCG: 50 INJECTION, SOLUTION INTRAMUSCULAR; INTRAVENOUS at 14:09

## 2024-08-29 RX ADMIN — Medication 3 MG: at 21:12

## 2024-08-29 RX ADMIN — Medication 5 MG: at 14:05

## 2024-08-29 RX ADMIN — SODIUM CHLORIDE, PRESERVATIVE FREE 10 ML: 5 INJECTION INTRAVENOUS at 20:20

## 2024-08-29 RX ADMIN — ALPRAZOLAM 0.5 MG: 0.5 TABLET ORAL at 01:21

## 2024-08-29 RX ADMIN — CEFAZOLIN SODIUM 2000 MG: 500 INJECTION, POWDER, FOR SOLUTION INTRAMUSCULAR; INTRAVENOUS at 14:07

## 2024-08-29 RX ADMIN — ATORVASTATIN CALCIUM 40 MG: 80 TABLET, FILM COATED ORAL at 20:20

## 2024-08-29 ASSESSMENT — PAIN SCALES - GENERAL: PAINLEVEL_OUTOF10: 0

## 2024-08-29 NOTE — PLAN OF CARE
[facility]    NEUROENDOVASCULAR SERVICE: POST-OP NOTE: 8/29/2024    Pt Name: Violeta Hawk  MRN: [id number]  YOB: 1965  Date of Procedure: 8/29/2024  Primary Care Physician: Virginia Ramires, APRN - NP        Pre-Procedural Diagnosis:R ICA complete occlusion   Post-Procedural Diagnosis:same      Procedure Performed:Diagnostic Cerebral Angiogram    Surgeon:   Ronny Delacruz MD    Fellow:  Rosemarie Gomez MD and Torsten Robin MD PhD     Assisting Tech:  Randy Rainey    PRE-PROCEDURAL EXAM:  Neurological exam performed and unchanged from initial H&P or consult      Anesthesia: IV Moderate Sedation  An Immediate re-assessment was completed prior to sedation, and it is determined to be safe to proceed.  Complications: none    Intra-Operative EXAM:  Patient sedated with unchanged limited neurological exam    EBL: < Minimal      Cc            Specimens: Were not Obtained  Contrast:     Visipaque 270 low osmolar 60 Cc             Fluoro: 9.9 min    Findings:  Please see dictated Radiology note for further details  R ICA complete occlusion with distal reconstitution of the lacerum-cavernous segment via the ophthalmic artery.   The left ICA run demonstrated contralateral slower filling of the right MCA/GARFIELD through the anterior communicating artery.   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  Groin checks per protocol.  Peripheral pulse checks

## 2024-08-29 NOTE — PROGRESS NOTES
Daily Progress Note  Neuro Critical Care    Patient Name: Violeta Hawk  Patient : 1965  Room/Bed: 0115/0115-01  Code Status: Full code  Allergies:   Allergies   Allergen Reactions    Amoxicillin Nausea Only    Lisinopril Other (See Comments)     Other Reaction(s): muscle cramps    Muscle cramps    Ziprasidone Hcl Other (See Comments) and Rash     MUSCLE WEAKNESS IN FACE       CHIEF COMPLAINT:      Intermittent left arm and leg weakness concerns for TIA        INTERVAL HISTORY /   Initial Presentation (Admitted 2024):  59-year-old female with past medical history of hypertension, TIAs, presented after symptoms of left upper and lower extremity abrupt onset shaking.     Patient experienced symptoms of abrupt onset left upper and lower extremity shaking lasted for 1 to 2 minutes, followed by weakness on the left side that resolved.  This happened on Saturday, 2024 in the morning.  Her symptoms resolved, and she had another set of symptoms consisting of slurred speech and left facial droop on Monday, 2024.  At that time, EMS was called, and patient was transported to [facility].  She was found to be hypertensive with initial SBP's of 200s.  However, the CT head did show hypodensity in the right frontal and parietal lobes with remote lacunar infarcts at the right caudate head.  CTA head and neck shows right ICA stenosis with reconstitution from ACOM.  Patient was started on heparin IV and loading dose of aspirin 324 mg.  Patient was not a TNK candidate due to being outside of the TNK window.     She was transferred to [place] for close monitoring.  She was continued on triple therapy including IV heparin drip, aspirin and Plavix.  She does have a significant smoking history where she smokes 1 ppd for 42 years.   MRI brain w/o contrast showed foci of acute infarcts in right centrum semiovale up to 5 mm. No mass effect or midline shift.       Hospital Course:   2024: Admitted to the

## 2024-08-29 NOTE — PLAN OF CARE
Problem: Discharge Planning  Goal: Discharge to home or other facility with appropriate resources  8/29/2024 0259 by Angelita Curran RN  Outcome: Progressing  Flowsheets (Taken 8/28/2024 2000)  Discharge to home or other facility with appropriate resources:   Identify barriers to discharge with patient and caregiver   Arrange for needed discharge resources and transportation as appropriate  8/28/2024 1721 by Rosa Barakat RN  Outcome: Progressing     Problem: Pain  Goal: Verbalizes/displays adequate comfort level or baseline comfort level  8/29/2024 0259 by Angelita Curran RN  Outcome: Progressing  8/28/2024 1721 by Rosa Barakat RN  Outcome: Progressing     Problem: Safety - Adult  Goal: Free from fall injury  8/29/2024 0259 by Angelita Curran RN  Outcome: Progressing  8/28/2024 1721 by Rosa Barakat RN  Outcome: Progressing     Problem: ABCDS Injury Assessment  Goal: Absence of physical injury  Outcome: Progressing     Problem: Chronic Conditions and Co-morbidities  Goal: Patient's chronic conditions and co-morbidity symptoms are monitored and maintained or improved  Outcome: Progressing  Flowsheets (Taken 8/28/2024 2000)  Care Plan - Patient's Chronic Conditions and Co-Morbidity Symptoms are Monitored and Maintained or Improved:   Monitor and assess patient's chronic conditions and comorbid symptoms for stability, deterioration, or improvement   Collaborate with multidisciplinary team to address chronic and comorbid conditions and prevent exacerbation or deterioration

## 2024-08-29 NOTE — PROGRESS NOTES
Pt bp elevated, gave IV labetalol. As RN was in pt's room, she stated she became severely anxious and woke up with heart racing and feeling dizzy. Pt states she does take xanax at home when she has these episodes. Pt also stated she received a dose of Xanax the other night d/t same issues and it did help. Neuro NP made aware and one time dose of xanax ordered.

## 2024-08-29 NOTE — PROGRESS NOTES
Endovascular Neurosurgery Progress Note      Reason for evaluation: Right ICA occlusion     SUBJECTIVE:   NAEO.  Will proceed with diagnostic cerebral angiogram later this afternoon      History of Chief Complaint:      The patient is a 59 y.o. female HTN, smoker, TIAs, who presents as a transfer from [facility] facility for transient left facial droop and left-sided weakness     Patient states that she noticed having shaking of her lower extremity for about 10 minutes and then noticed having left leg weakness for about an hour until subsided on its own, she states that she had TIAs in the past with left-sided weakness, should be taken aspirin but states that she has not been taking for the past week or so, she was seen at [facility] with an initial blood pressure of 200s, was given a dose of labetalol to draw blood pressure, stroke team were consulted.  CT head was unremarkable for acute abnormalities.  However did show hypodensity at the right frontal right parietal lobes, remote lacunar infarct at the right caudate head.  CTA head and neck per report was showing a right ICA stenosis with reconstitution from ACOM.  Decision was to start patient on IV heparin and he will receive a loading dose of aspirin 324 mg p.o. once prior to arrival.  Blood pressure over here was 180s.  Patient was noted to ambulate, NIH of 2 due to right inferior quadrantanopsia and slurred speech.  Did have some weakness at the lower extremity noted on exam.      Allergies  is allergic to amoxicillin, lisinopril, and ziprasidone hcl.  Medications  Prior to Admission medications    Medication Sig Start Date End Date Taking? Authorizing Provider   cloNIDine (CATAPRES) 0.1 MG tablet Take 1 tablet by mouth daily as needed for High Blood Pressure 4/4/24  Yes Provider, MD Charley   lurasidone (LATUDA) 40 MG TABS tablet Take 1 tablet by mouth daily 4/29/22  Yes Provider, MD Charley   amLODIPine (NORVASC) 10 MG tablet Take 1 tablet by

## 2024-08-29 NOTE — PROGRESS NOTES
Message sent to spiritual services to make aware pt's daughter would like to have prayer for pt at 0800 on 8/29, before having a procedure done.

## 2024-08-30 VITALS
TEMPERATURE: 98.2 F | BODY MASS INDEX: 29.14 KG/M2 | SYSTOLIC BLOOD PRESSURE: 156 MMHG | DIASTOLIC BLOOD PRESSURE: 116 MMHG | HEIGHT: 61 IN | OXYGEN SATURATION: 95 % | HEART RATE: 78 BPM | RESPIRATION RATE: 14 BRPM | WEIGHT: 154.32 LBS

## 2024-08-30 LAB
ANION GAP SERPL CALCULATED.3IONS-SCNC: 15 MMOL/L (ref 9–16)
BUN SERPL-MCNC: 14 MG/DL (ref 8–23)
CALCIUM SERPL-MCNC: 10.1 MG/DL (ref 8.6–10.4)
CHLORIDE SERPL-SCNC: 100 MMOL/L (ref 98–107)
CO2 SERPL-SCNC: 23 MMOL/L (ref 20–31)
CREAT SERPL-MCNC: 1.1 MG/DL (ref 0.5–0.9)
ERYTHROCYTE [DISTWIDTH] IN BLOOD BY AUTOMATED COUNT: 14.5 % (ref 11.8–14.4)
GFR, ESTIMATED: 60 ML/MIN/1.73M2
GLUCOSE BLD-MCNC: 195 MG/DL (ref 65–105)
GLUCOSE SERPL-MCNC: 145 MG/DL (ref 74–99)
HCT VFR BLD AUTO: 41.3 % (ref 36.3–47.1)
HGB BLD-MCNC: 13.9 G/DL (ref 11.9–15.1)
MCH RBC QN AUTO: 29.4 PG (ref 25.2–33.5)
MCHC RBC AUTO-ENTMCNC: 33.7 G/DL (ref 28.4–34.8)
MCV RBC AUTO: 87.5 FL (ref 82.6–102.9)
NRBC BLD-RTO: 0 PER 100 WBC
PLATELET # BLD AUTO: 325 K/UL (ref 138–453)
PMV BLD AUTO: 10 FL (ref 8.1–13.5)
POTASSIUM SERPL-SCNC: 4 MMOL/L (ref 3.7–5.3)
RBC # BLD AUTO: 4.72 M/UL (ref 3.95–5.11)
SODIUM SERPL-SCNC: 138 MMOL/L (ref 136–145)
WBC OTHER # BLD: 13.7 K/UL (ref 3.5–11.3)

## 2024-08-30 PROCEDURE — 80048 BASIC METABOLIC PNL TOTAL CA: CPT

## 2024-08-30 PROCEDURE — B31FYZZ FLUOROSCOPY OF LEFT VERTEBRAL ARTERY USING OTHER CONTRAST: ICD-10-PCS | Performed by: PSYCHIATRY & NEUROLOGY

## 2024-08-30 PROCEDURE — 85027 COMPLETE CBC AUTOMATED: CPT

## 2024-08-30 PROCEDURE — 82947 ASSAY GLUCOSE BLOOD QUANT: CPT

## 2024-08-30 PROCEDURE — 97130 THER IVNTJ EA ADDL 15 MIN: CPT

## 2024-08-30 PROCEDURE — 6370000000 HC RX 637 (ALT 250 FOR IP)

## 2024-08-30 PROCEDURE — B319YZZ FLUOROSCOPY OF RIGHT EXTERNAL CAROTID ARTERY USING OTHER CONTRAST: ICD-10-PCS | Performed by: PSYCHIATRY & NEUROLOGY

## 2024-08-30 PROCEDURE — B318YZZ FLUOROSCOPY OF BILATERAL INTERNAL CAROTID ARTERIES USING OTHER CONTRAST: ICD-10-PCS | Performed by: PSYCHIATRY & NEUROLOGY

## 2024-08-30 PROCEDURE — B31RYZZ FLUOROSCOPY OF INTRACRANIAL ARTERIES USING OTHER CONTRAST: ICD-10-PCS | Performed by: PSYCHIATRY & NEUROLOGY

## 2024-08-30 PROCEDURE — 2580000003 HC RX 258

## 2024-08-30 PROCEDURE — B41JYZZ FLUOROSCOPY OF OTHER LOWER ARTERIES USING OTHER CONTRAST: ICD-10-PCS | Performed by: PSYCHIATRY & NEUROLOGY

## 2024-08-30 PROCEDURE — 99232 SBSQ HOSP IP/OBS MODERATE 35: CPT | Performed by: STUDENT IN AN ORGANIZED HEALTH CARE EDUCATION/TRAINING PROGRAM

## 2024-08-30 PROCEDURE — B41FYZZ FLUOROSCOPY OF RIGHT LOWER EXTREMITY ARTERIES USING OTHER CONTRAST: ICD-10-PCS | Performed by: PSYCHIATRY & NEUROLOGY

## 2024-08-30 PROCEDURE — B315YZZ FLUOROSCOPY OF BILATERAL COMMON CAROTID ARTERIES USING OTHER CONTRAST: ICD-10-PCS | Performed by: PSYCHIATRY & NEUROLOGY

## 2024-08-30 PROCEDURE — 97129 THER IVNTJ 1ST 15 MIN: CPT

## 2024-08-30 PROCEDURE — 36415 COLL VENOUS BLD VENIPUNCTURE: CPT

## 2024-08-30 RX ORDER — AMLODIPINE BESYLATE 10 MG/1
10 TABLET ORAL DAILY
Status: DISCONTINUED | OUTPATIENT
Start: 2024-08-30 | End: 2024-08-30 | Stop reason: HOSPADM

## 2024-08-30 RX ORDER — CLOPIDOGREL BISULFATE 75 MG/1
75 TABLET ORAL DAILY
Qty: 30 TABLET | Refills: 3 | Status: SHIPPED | OUTPATIENT
Start: 2024-08-31

## 2024-08-30 RX ORDER — ENOXAPARIN SODIUM 100 MG/ML
40 INJECTION SUBCUTANEOUS DAILY
Status: DISCONTINUED | OUTPATIENT
Start: 2024-08-30 | End: 2024-08-30 | Stop reason: HOSPADM

## 2024-08-30 RX ADMIN — SODIUM CHLORIDE, PRESERVATIVE FREE 10 ML: 5 INJECTION INTRAVENOUS at 10:28

## 2024-08-30 RX ADMIN — GABAPENTIN 400 MG: 400 CAPSULE ORAL at 09:55

## 2024-08-30 RX ADMIN — CLOPIDOGREL BISULFATE 75 MG: 75 TABLET ORAL at 09:55

## 2024-08-30 RX ADMIN — ASPIRIN 81 MG: 81 TABLET, COATED ORAL at 09:55

## 2024-08-30 RX ADMIN — AMLODIPINE BESYLATE 10 MG: 10 TABLET ORAL at 12:25

## 2024-08-30 ASSESSMENT — PAIN SCALES - GENERAL
PAINLEVEL_OUTOF10: 0
PAINLEVEL_OUTOF10: 0

## 2024-08-30 NOTE — PROGRESS NOTES
Daily Progress Note  Neuro Critical Care    Patient Name: Violeta Hawk  Patient : 1965  Room/Bed: 0115/0115-01  Code Status: FULL  Allergies:   Allergies   Allergen Reactions    Amoxicillin Nausea Only    Lisinopril Other (See Comments)     Other Reaction(s): muscle cramps    Muscle cramps    Ziprasidone Hcl Other (See Comments) and Rash     MUSCLE WEAKNESS IN FACE       CHIEF COMPLAINT:        Intermittent left arm and leg weakness concern for TIA        INTERVAL HISTORY      Initial Presentation (Admitted 2024):  59-year-old female with past medical history of hypertension, TIAs, presented after symptoms of left upper and lower extremity abrupt onset shaking.     Patient experienced symptoms of abrupt onset left upper and lower extremity shaking lasted for 1 to 2 minutes, followed by weakness on the left side that resolved.  This happened on Saturday, 2024 in the morning.  Her symptoms resolved, and she had another set of symptoms consisting of slurred speech and left facial droop on Monday, 2024.  At that time, EMS was called, and patient was transported to [facility].  She was found to be hypertensive with initial SBP's of 200s.  However, the CT head did show hypodensity in the right frontal and parietal lobes with remote lacunar infarcts at the right caudate head.  CTA head and neck shows right ICA stenosis with reconstitution from ACOM.  Patient was started on heparin IV and loading dose of aspirin 324 mg.  Patient was not a TNK candidate due to being outside of the TNK window.     She was transferred to [place] for close monitoring.  She was continued on triple therapy including IV heparin drip, aspirin and Plavix.  She does have a significant smoking history where she smokes 1 ppd for 42 years.   MRI brain w/o contrast showed foci of acute infarcts in right centrum semiovale up to 5 mm. No mass effect or midline shift.         Hospital Course:   2024: Admitted to the  without evidence of hemodynamically significant stenosis. Critical Results: Category 1 The critical information above was relayed directly by me by telephone to HARRISON Saul8/26/2024 at 11:10 am.  Neuro consult will be obtained Interpretation by Steven Garcia, Professional Interpretation by  Radiology Reading Workstation: [id number]    CT Head Without Contrast    Result Date: 8/26/2024  1. There are a few regions of remote appearing ischemic infarction within the right MCA territory as above. 2. No acute intracranial hemorrhage or definitively acute intracranial abnormalities noted. Critical Results: Category 1 The critical information above was relayed directly by me by telephone to HARRISON Saul8/26/2024 at 10:51 am. Interpretation by Steven Garcia, Professional Interpretation by  Radiology Reading Workstation: [id number]       Labs and Images reviewed with:  [x] Dr. Tigre Augustine  [] Dr. Fei Wilson  [] Dr. Octavio Holden  [] There are no new interval images to review.     PHYSICAL EXAM       CONSTITUTIONAL:  Well developed, well nourished, alert and oriented x 3, in no acute distress. GCS 15. Nontoxic. No dysarthria. No aphasia.   HEAD:  normocephalic, atraumatic    EYES:  PERRLA, EOMI.   ENT:  moist mucous membranes   NECK:  supple, symmetric   LUNGS:  Equal air entry bilaterally   CARDIOVASCULAR:  normal s1 / s2, RRR, distal pulses intact   ABDOMEN:  Soft, no rigidity   NEUROLOGIC:  Mental Status:  A & O x3,awake             Cranial Nerves:    cranial nerves II-XII are grossly intact    Motor Exam:    Drift:  absent  Tone:  normal    Motor exam is symmetrical 5 out of 5 all extremities bilaterally    Sensory:    Touch:    Right Upper Extremity:  normal  Left Upper Extremity:  normal  Right Lower Extremity:  normal  Left Lower Extremity:  normal    Deep Tendon Reflexes:    Right Bicep:  2+  Left Bicep:  2+  Right Knee:  2+  Left Knee:  2+    Plantar Response:  Right:  downgoing  Left:  downgoing    Clonus:

## 2024-08-30 NOTE — PROGRESS NOTES
Pt BP elevated d/t anxiety per pt. Talked to pt in length regarding dx and today's test results. Pt feeling despair and on edge that she will not have a good outcome. Reassured pt to make sure she kept all her follow up appointments and to make sure she makes an appointment with her PCP to talk about her anxiety issues. We did some relaxation methods and pt still visibly anxious. Spoke with Chata, NP regarding pt's anxiety and 0.5 xanax and Melatonin ordered to help pt get a good nights rest. Told pt the plan and education on medications. Patient satisfied.

## 2024-08-30 NOTE — PROGRESS NOTES
Date of Service: 8/30/24    Procedure: Diagnostic cerebral angiogram     Patient arrived and wheeled in to the angio suite at: 200  Monitoring and administration of sedation started at: 208  Puncture obtained at: 210  Vascular access was removed at: 253  Manual pressure for minutes: 30 mins  Moderate sedation ended at: 330  Patient wheeled out of the angio suite at: 330        Diagnosis: R ICA occlusion    Procedures:  --Right ultrasound guided femoral artery access  --Intravenous moderate sedation  --Selective right common carotid artery (CCA) angiogram   --Selective right internal carotid artery (ICA) stumpogram  --Selective right external carotid artery (ECA) angiogram   --Selective left common carotid artery (CCA) angiogram   --Selective left internal carotid artery (ICA) cerebral angiogram   --Selective left vertebral artery (VA) cerebral angiogram   --Right common femoral artery (CFA) angiogram     Neurointerventionalist: Dr. Igor Delacruz    Stowell:  Torsten Robin MD, Rosemarie Goodman MD    Contrast: 60 cc of Visipaque-270.    Fluoroscopy time: 9.9 minutes    Access: Right common femoral artery.    Comparison: none    Consent:   After explaining the risks and benefits to the patient and the patient's family, including but not limited to stroke, coma, death, vessel injury, dissection, tear, occlusion, and X-ray dye allergic type reaction, a signed consent form was obtained.     Indication and Clinical History:   Violeta Hawk is a 59 y.o. female with medical history of R ICA occlusion and acute R centrum semiovale infarct. Pt was referred for a diagnostic angiogram.      Anesthesia:   Local anesthesia with lidocaine. IV moderate sedation with Versed and Fentanyl. IV moderate sedation was supervised by the attending physician. The patient was independently monitored by a registered nurse assigned to the Department of Radiology using automated blood pressure, EKG and pulse oximetry. The detailed Conscious Record    The left vertebral artery injection demonstrates normal antegrade opacification of the left vertebral artery, including the cervical segment, basilar artery, the P1/P2/P3 bilaterally. There is occlusion of the R P4 branch that is likely chronic. Retrograde contrast opacification of the contralateral right V4 segment was achieved and demonstrated no evidence of an aneurysm at the origin of the right posterior inferior cerebellar artery.  Capillary and venous phase images were unremarkable.     Right common femoral artery  Technique:   A right common femoral artery angiogram was performed and demonstrated arterial catheterization proximal to the bifurcation.   Interpretation:  There was no evidence of dissection or occlusion within the right common femoral artery.     Closure: Vascade 5F was used to establish hemostasis at the right common femoral artery access site. No immediate complications were experienced. Patient was re-examined after the procedure with no change noted in their neurologic examination, with distal pulses present. The patient was subsequently discharged from the neurointerventional suite.     Impression:   -- Selective angiograms from the R CCA, R ICA (stumpogram), R ECA, L CCA, L ICA, L VA w/ filling of R V4, and R femoral.  -- Occlusion of the R proximal cervical ICA just distal to the CCA bifurcation with no distal contrast opacification  -- R ECA shows anastomosis with retrograde filling via the ophthalmic to the cavernous-lacerum ICA segments, with a suspected slow flow or thrombus between these segments  -- L ICA shows anterograde filling with via the Acomm to the R GARFIELD and R MCA, with severe stenosis of the R MCA measuring ~65% by WASID criteria.  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  -- Occlusion of the R P4 branch that is likely chronic      Dr. Robin dictated  this invasive procedure. Dr. Igor Delacruz was present for all procedural and imaging components of this case. Examination was reviewed and reported findings confirmed and evaluated by Dr. Igor Delacruz.

## 2024-08-30 NOTE — DISCHARGE SUMMARY
No significant stenosis of the vertebral arteries.     CTA HEAD NECK W CONTRAST    Result Date: 8/26/2024  EXAM INFORMATION: Examination: [facility] CTA HEAD AND NECK Date of Exam: 8/26/2024 10:49 am Diagnosis/Reason for Exam: slurred speech, left facial droop; Slurred speech; Facial droop Additional History: pt left sideded weakness, slurred speech, h/o TIAs Contrast: 70ml omni 350/ 27ml saline chaser Comparison: No comparisons Technique: CT images were obtained of the neck and head with 3-D reconstructed images following angiogram protocol with contrast administration.  Sagittal and coronal reformats were performed. This CT exam was performed using one or more of the following  dose reduction techniques: automated exposure control, adjustment of the mA and/or kV according to patient size, or use of iterative reconstruction technique. Arterial blood flow was measured. DISCUSSION: Angiogram: Aorta: Bovine type aortic arch.  Moderate atherosclerotic change within the aorta. Subclavian and brachiocephalic arteries: Atherosclerosis present without evidence of hemodynamically significant stenosis when allowing for mild cardiorespiratory motion artifact. Right carotid vasculature: Rodded common carotid artery and carotid bulb are patent.  There is occlusion of the ICA throughout its course within the neck with reconstitution of flow within the ICA terminus via the anterior communicating artery with retrograde filling to the level of the cavernous ICA. Left carotid vasculature: Common carotid artery, carotid bifurcation, and internal carotid arteries patent.  Mild atherosclerotic change without evidence of hemodynamically significant stenosis. Extracranial vertebral arteries: Bilateral vertebral arteries are patent and unremarkable. Vertebrobasilar: Intradural vertebral and basilar arteries patent and unremarkable. Cerebellar arteries patent. Communicating arteries: Anterior communicating artery is patent as is the left  mouth daily  Start taking on: August 31, 2024            CONTINUE taking these medications      amLODIPine 10 MG tablet  Commonly known as: NORVASC     aspirin 81 MG EC tablet     atorvastatin 40 MG tablet  Commonly known as: LIPITOR     cetirizine 10 MG tablet  Commonly known as: ZYRTEC     cloNIDine 0.1 MG tablet  Commonly known as: CATAPRES     cyclobenzaprine 10 MG tablet  Commonly known as: FLEXERIL     gabapentin 400 MG capsule  Commonly known as: NEURONTIN     lurasidone 40 MG Tabs tablet  Commonly known as: LATUDA     metFORMIN 500 MG tablet  Commonly known as: GLUCOPHAGE               Where to Get Your Medications        These medications were sent to [facility]/pharmacy #[id number] - [place], [place] - [address] -  [phone] - F [phone]  [address] [id number]      Phone: [phone]   clopidogrel 75 MG tablet       Diet: ADULT DIET; Regular diet as tolerated  Activity: ***  Follow-up:  ***  Time Spent for discharge: 30 minutes    Fahad Albarado DO  Neuro Critical Care  8/30/2024, 12:31 PM

## 2024-08-30 NOTE — PROGRESS NOTES
SLP ALL NOTES  Speech Language Pathology  [facility]    Cognitive Treatment Note    Date: 8/30/2024  Patient’s Name: Violeta Hawk  MRN: [id number]  Diagnosis:   Patient Active Problem List   Diagnosis Code    ICAO (internal carotid artery occlusion), right I65.21    Occlusion and stenosis of cerebral artery I66.9    Cerebrovascular accident (CVA) due to occlusion of right carotid artery (HCC) I63.231       Pain: 0/10    Cognitive Treatment    Treatment time: 9:18-9:41      Subjective: [x] Alert [x] Cooperative     [] Confused     [] Agitated    [] Lethargic      Objective/Assessment:  Attention: Pt would stray from task to make a side comment, but was able to redirect back to tasks.      Recall: Delayed Recall with Distractions: 3/3, 3/3; Category Members-Magdalena: 28/32 increased to 29/32 with min cues.      Problem Solving/Reasoning: Stating Situational Problems: 6/6; Opposites: 6/10 increased to 8/10 with min-mod cues; Determining Category Exclusions: 9/10 - max cues were given for last task but Pt was unable to think of answer. Problem Solving-Missing Equipment: 7/8 increased to 8/8 with mod cues.      Plan:  [x] Continue ST services    [] Discharge from ST:      Discharge recommendations: []  Further therapy recommended at discharge.The patient should be able to tolerate at least 3 hours of therapy per day over 5 days or 15 hours over 7 days. [x] Further therapy recommended at discharge.   [] No therapy recommended at discharge.      Completed by: Joselyn Ortiz  Clinician    Cosigned By: Kristyn Goldsmith M.S.CCC/SLP

## 2024-08-30 NOTE — PROGRESS NOTES
Endovascular Neurosurgery Progress Note      Reason for evaluation: Right ICA occlusion     SUBJECTIVE:   NAEO.  Status post diagnostic cerebral angiogram.  Doing well.  Discussed the results with her and her family and treatment options      History of Chief Complaint:      The patient is a 59 y.o. female HTN, smoker, TIAs, [facility]op and left-sided weakness     Patient states that she noticed having shaking of her lower extremity for about 10 minutes and then noticed having left leg weakness for about an hour until subsided on its own, she states that she had TIAs in the past with left-sided weakness, should be taken aspirin but states that she has not been taking for the past week or so, she was seen at [facility] with an initial blood pressure of 200s, was given a dose of labetalol to draw blood pressure, stroke team were consulted.  CT head was unremarkable for acute abnormalities.  However did show hypodensity at the right frontal right parietal lobes, remote lacunar infarct at the right caudate head.  CTA head and neck per report was showing a right ICA stenosis with reconstitution from ACOM.  Decision was to start patient on IV heparin and he will receive a loading dose of aspirin 324 mg p.o. once prior to arrival.  Blood pressure over here was 180s.  Patient was noted to ambulate, NIH of 2 due to right inferior quadrantanopsia and slurred speech.  Did have some weakness at the lower extremity noted on exam.      Allergies  is allergic to amoxicillin, lisinopril, and ziprasidone hcl.  Medications  Prior to Admission medications    Medication Sig Start Date End Date Taking? Authorizing Provider   cloNIDine (CATAPRES) 0.1 MG tablet Take 1 tablet by mouth daily as needed for High Blood Pressure 4/4/24  Yes Provider, Charley, MD   lurasidone (LATUDA) 40 MG TABS tablet Take 1 tablet by mouth daily 4/29/22  Yes Provider, Historical,    EYES:  negative for double vision, blurred vision and photophobia     HEENT:  negative for tinnitus, epistaxis and sore throat    RESPIRATORY:  negative for cough, shortness of breath, wheezing    CARDIOVASCULAR:  negative for chest pain, palpitations, syncope, edema    GASTROINTESTINAL:  negative for nausea, vomiting    GENITOURINARY:  negative for incontinence    MUSCULOSKELETAL:  negative for neck or back pain    NEUROLOGICAL:  Negative for weakness and tingling  negative for headaches and dizziness    PSYCHIATRIC:  negative for anxiety      Review of systems otherwise negative.      OBJECTIVE:     Vitals:    24 0600   BP: (!) 145/59   Pulse: 58   Resp: 23   Temp:    SpO2: 93%        General:  Gen: normal habitus, NAD  HEENT: NCAT, mucosa moist  Cvs: RRR, S1 S2 normal  Resp: symmetric unlabored breathing  Abd: s/nd/nt  Ext: no edema  Skin: no lesions seen, warm and dry    Neuro:  Gen: awake and alert, oriented x3.   Lang/speech: no aphasia or dysarthria. Follows commands.  CN: PERRL, EOMI, right inferior quadrantanopsia, V1-3 intact, face symmetric, hearing intact, shoulder shrug symmetric, tongue midline  Motor: grossly 5/5 UE and RLE, 4+/5 LLE   Sense: LT intact in all 4 ext.  Coord: FTN and HTS intact b/l  DTR: deferred  Gait: narrow base gait    NIH Stroke Scale:   1a.  Level of consciousness:  0 - alert; keenly responsive  1b.  Level of consciousness questions:  0 - answers both questions correctly  1c.  Level of consciousness questions:  0 - performs both tasks correctly  2.    Best Gaze:  0 - normal  3.    Visual:  1 - partial hemianopia  4.    Facial Palsy:  0 - normal symmetric movement  5a.  Motor left arm:  0 - no drift, limb holds 90 (or 45) degrees for full 10 seconds  5b.  Motor right arm:  0 - no drift, limb holds 90 (or 45) degrees for full 10 seconds  6a.  Motor left le - no drift; leg holds 30 degree position for full 5 seconds  6b.  Motor right le - no drift; leg holds 30  carotid occlusion/watershed infarcts/carotid stump syndrome    PLAN:     Continue the dual antiplatelet therapy for a total of 3 months followed by baby aspirin  Lipitor 40 mg home dose  Systolic blood pressure: Normal  Will consider endovascular treatment for the pseudoaneurysm and ulcerative plaque as an outpatient          Follow up with Torsten Robin MD PhD  4-6 weeks after discharge and Dr. Delacruz 3-4 months after discharge.      Case discussed with Dr. Nubia Goodman MD   Stroke, Neurocritical Care & Neurointervention  [facility]  [facility] Stroke [facility]  Electronically signed 8/30/2024 at 9:24 AM

## 2024-09-04 NOTE — FLOWSHEET NOTE
Stroke Follow up Phone Call    Hello this is Amina from [facility] stroke team calling to see how you are doing since your d/c.       Medication List        START taking these medications      clopidogrel 75 MG tablet  Commonly known as: PLAVIX  Take 1 tablet by mouth daily     nicotine 7 MG/24HR  Commonly known as: NICODERM CQ  Place 1 patch onto the skin daily for 14 days            CONTINUE taking these medications      amLODIPine 10 MG tablet  Commonly known as: NORVASC     aspirin 81 MG EC tablet     atorvastatin 40 MG tablet  Commonly known as: LIPITOR     cetirizine 10 MG tablet  Commonly known as: ZYRTEC     cloNIDine 0.1 MG tablet  Commonly known as: CATAPRES     cyclobenzaprine 10 MG tablet  Commonly known as: FLEXERIL     gabapentin 400 MG capsule  Commonly known as: NEURONTIN     lurasidone 40 MG Tabs tablet  Commonly known as: LATUDA     metFORMIN 500 MG tablet  Commonly known as: GLUCOPHAGE               Where to Get Your Medications        These medications were sent to [facility]/pharmacy #[id number] - [address] [phone] -  [phone]  [address] [place], [place] [id number]      Phone: [phone]   clopidogrel 75 MG tablet  nicotine 7 MG/24HR         Can you tell me what medications you are taking?  [x]   Yes  []   No    Do you have any questions about your medications?   []   Yes  [x]   No    All medications reviewed with patient    Do you have a follow up apt. With the neurologist?   [x]   Yes  []   No  Provided number to [facility] [phone]    Do you now your risk factors for stroke?  [x]   Yes  []   No    Can you tell me the signs and symptoms of stroke?  [x]   Yes  []   No  BEFAST instructions provided    Do if you know what to do if you were having signs/symptoms of stroke?  [x]   Yes  []   No  Instructions to call 911 provided    Our goal is to provide the very best stroke care, on a scale of 1 to 10 with 10 as the very best who would you rank the care

## 2024-09-13 ENCOUNTER — HOSPITAL ENCOUNTER (OUTPATIENT)
Dept: MAMMOGRAPHY | Facility: HOSPITAL | Age: 59
Discharge: HOME | End: 2024-09-13
Payer: COMMERCIAL

## 2024-09-13 ENCOUNTER — HOSPITAL ENCOUNTER (OUTPATIENT)
Dept: CT IMAGING | Facility: HOSPITAL | Age: 59
Discharge: HOME | End: 2024-09-13
Payer: COMMERCIAL

## 2024-09-13 PROCEDURE — 71271 CT THORAX LUNG CANCER SCR C-: CPT

## 2024-09-13 PROCEDURE — 77067 SCR MAMMO BI INCL CAD: CPT

## 2024-09-13 PROCEDURE — 77063 BREAST TOMOSYNTHESIS BI: CPT

## 2024-09-18 DIAGNOSIS — Z72.0 TOBACCO ABUSE: Primary | ICD-10-CM

## 2024-10-02 ENCOUNTER — OFFICE VISIT (OUTPATIENT)
Dept: OBSTETRICS AND GYNECOLOGY | Age: 59
End: 2024-10-02
Payer: COMMERCIAL

## 2024-10-02 VITALS
WEIGHT: 148 LBS | BODY MASS INDEX: 25.27 KG/M2 | DIASTOLIC BLOOD PRESSURE: 70 MMHG | SYSTOLIC BLOOD PRESSURE: 124 MMHG | HEIGHT: 64 IN

## 2024-10-02 DIAGNOSIS — Z01.419 WELL WOMAN EXAM WITH ROUTINE GYNECOLOGICAL EXAM: Primary | ICD-10-CM

## 2024-10-02 DIAGNOSIS — Z12.4 SCREENING FOR CERVICAL CANCER: ICD-10-CM

## 2024-10-02 RX ORDER — FLUCONAZOLE 200 MG/1
TABLET ORAL
COMMUNITY
Start: 2024-08-22

## 2024-10-02 NOTE — PROGRESS NOTES
Subjective     Chief Complaint   Patient presents with    Gynecologic Exam     Annual, last pap 2023 neg/hpv neg, mg 2024, dexa 2023, csy 2015  CC:  no problems       History of Present Illness    Anabella Baum is a 59 y.o.  who presents for annual exam.    Doing well  Mammo UTD  She is postmenopausal - no vaginal bleeding or HRT  Has a significant history of breast cancer in her sister and maternal aunt.  They have both been tested for genetic causes and were negative.   She has colonoscopy scheduled next month  She noted a sharp pain in right breast only when wearing bra the last couple of days. Did not feel any lumps or masses when doing SBE.     Obstetric History:  OB History          1    Para   1    Term   1            AB        Living   4         SAB        IAB        Ectopic        Molar        Multiple   1    Live Births   4               Menstrual History:     No LMP recorded (lmp unknown). Patient is postmenopausal.         Current contraception: post menopausal status  History of abnormal Pap smear: no  Received Gardasil immunization: no  Perform regular self breast exam: yes - .  Family history of uterine or ovarian cancer: no  Family History of colon cancer: yes - see hx  Family history of breast cancer: yes - see hx    Mammogram: up to date.  Colonoscopy: Scheduled.  DEXA: up to date.    Exercise: moderately active  Calcium/Vitamin D: uses supplements    The following portions of the patient's history were reviewed and updated as appropriate: allergies, current medications, past family history, past medical history, past social history, past surgical history, and problem list.    Review of Systems   Constitutional: Negative.    Respiratory: Negative.     Cardiovascular: Negative.    Gastrointestinal: Negative.    Genitourinary: Negative.    Skin: Negative.    Psychiatric/Behavioral: Negative.             Objective   Physical Exam  Constitutional:        General: She is awake.      Appearance: Normal appearance. She is well-developed.   HENT:      Head: Normocephalic and atraumatic.      Nose: Nose normal.   Neck:      Thyroid: No thyroid mass, thyromegaly or thyroid tenderness.   Cardiovascular:      Rate and Rhythm: Normal rate and regular rhythm.      Pulses: Normal pulses.      Heart sounds: Normal heart sounds.   Pulmonary:      Effort: Pulmonary effort is normal.      Breath sounds: Normal breath sounds.   Chest:   Breasts:     Breasts are symmetrical.      Right: Normal. No swelling, bleeding, inverted nipple, mass, nipple discharge, skin change or tenderness.      Left: Normal. No swelling, bleeding, inverted nipple, mass, nipple discharge, skin change or tenderness.   Abdominal:      General: Abdomen is flat. Bowel sounds are normal.      Palpations: Abdomen is soft.      Tenderness: There is no abdominal tenderness.   Genitourinary:     General: Normal vulva.      Labia:         Right: No rash, tenderness, lesion or injury.         Left: No rash, tenderness, lesion or injury.       Urethra: No prolapse, urethral pain, urethral swelling or urethral lesion.      Vagina: Normal. No signs of injury. No vaginal discharge, erythema, tenderness, bleeding, lesions or prolapsed vaginal walls.      Cervix: Normal. No discharge, friability, lesion, erythema or cervical bleeding.      Uterus: Normal. Not enlarged, not tender and no uterine prolapse.       Adnexa: Right adnexa normal and left adnexa normal.        Right: No mass, tenderness or fullness.          Left: No mass, tenderness or fullness.        Rectum: Normal. No mass.   Musculoskeletal:      Cervical back: Normal range of motion and neck supple.   Lymphadenopathy:      Upper Body:      Right upper body: No supraclavicular adenopathy.      Left upper body: No supraclavicular adenopathy.   Skin:     General: Skin is warm and dry.   Neurological:      General: No focal deficit present.      Mental Status:  "She is alert and oriented to person, place, and time.   Psychiatric:         Mood and Affect: Mood normal.         Behavior: Behavior normal. Behavior is cooperative.         Thought Content: Thought content normal.         Judgment: Judgment normal.         /70   Ht 162.6 cm (64\")   Wt 67.1 kg (148 lb)   LMP  (LMP Unknown)   BMI 25.40 kg/m²     Assessment & Plan   Diagnoses and all orders for this visit:    1. Well woman exam with routine gynecological exam (Primary)    2. Screening for cervical cancer  -     IGP, Apt HPV,rfx 16 / 18,45        All questions answered.  Breast self exam technique reviewed and patient encouraged to perform self-exam monthly.  Discussed healthy lifestyle modifications.  Recommended 30 minutes of aerobic exercise five times per week.  Discussed calcium needs to prevent osteoporosis.    -Pap done  -Mammo UTD  -Colonoscopy scheduled  -She will monitor breast pain. If persists or feels any new lump/mass will plan further breast imaging. Pt agreeable.               "

## 2024-10-07 LAB
CYTOLOGIST CVX/VAG CYTO: NORMAL
CYTOLOGY CVX/VAG DOC CYTO: NORMAL
CYTOLOGY CVX/VAG DOC THIN PREP: NORMAL
DX ICD CODE: NORMAL
HPV I/H RISK 4 DNA CVX QL PROBE+SIG AMP: NEGATIVE
Lab: NORMAL
OTHER STN SPEC: NORMAL
STAT OF ADQ CVX/VAG CYTO-IMP: NORMAL

## 2024-10-18 ENCOUNTER — OFFICE VISIT (OUTPATIENT)
Dept: NEUROLOGY | Age: 59
End: 2024-10-18
Payer: MEDICARE

## 2024-10-18 VITALS
BODY MASS INDEX: 28.7 KG/M2 | WEIGHT: 152 LBS | HEART RATE: 85 BPM | HEIGHT: 61 IN | SYSTOLIC BLOOD PRESSURE: 131 MMHG | DIASTOLIC BLOOD PRESSURE: 82 MMHG

## 2024-10-18 DIAGNOSIS — I65.21 ICAO (INTERNAL CAROTID ARTERY OCCLUSION), RIGHT: ICD-10-CM

## 2024-10-18 DIAGNOSIS — I72.0 CAROTID PSEUDOANEURYSM (HCC): Primary | ICD-10-CM

## 2024-10-18 PROCEDURE — 99215 OFFICE O/P EST HI 40 MIN: CPT | Performed by: STUDENT IN AN ORGANIZED HEALTH CARE EDUCATION/TRAINING PROGRAM

## 2024-10-18 RX ORDER — ALPRAZOLAM 0.5 MG
0.5 TABLET ORAL DAILY PRN
COMMUNITY
Start: 2024-10-16

## 2024-10-18 RX ORDER — VALSARTAN 40 MG/1
40 TABLET ORAL EVERY MORNING
COMMUNITY

## 2024-10-18 RX ORDER — LANCETS 28 GAUGE
1 EACH MISCELLANEOUS 3 TIMES DAILY
COMMUNITY
Start: 2024-10-16

## 2024-10-18 RX ORDER — BLOOD SUGAR DIAGNOSTIC
1 STRIP MISCELLANEOUS 3 TIMES DAILY
COMMUNITY
Start: 2024-10-14

## 2024-10-18 NOTE — PROGRESS NOTES
movement  5a.  Motor left arm:  0 - no drift, limb holds 90 (or 45) degrees for full 10 seconds  5b.  Motor right arm:  0 - no drift, limb holds 90 (or 45) degrees for full 10 seconds  6a.  Motor left le - no drift; leg holds 30 degree position for full 5 seconds  6b.  Motor right le - no drift; leg holds 30 degree position for full 5 seconds  7.    Limb Ataxia:  0 - absent  8.    Sensory:  0 - normal; no sensory loss  9.    Best Language:  0 - no aphasia, normal  10.  Dysarthria:  1 - mild to moderate, patient slurs at least some words and at worst, can be understood with some difficulty  11.  Extinction and Inattention:  0 - no abnormality     TOTAL:  2    mRS premorbid is 2      LABS:   Reviewed.  Lab Results   Component Value Date    HGB 13.9 2024    WBC 13.7 (H) 2024     2024     2024    BUN 14 2024    CREATININE 1.1 (H) 2024    MG 2.3 2024    APTT 33.1 2024    INR 1.1 2024      No results found for: \"COVID19\"    RADIOLOGY:   Images were personally reviewed includin. Foci of acute infarcts in the right centrum semiovale measuring up to 5 mm   size. No mass effect or midline shift. No acute intracranial hemorrhage.   2. Occlusion of the right ICA at the ostium and throughout the cervical   course.   3. Occlusion of the intracranial right ICA with reconstitution of the carotid   terminus via cross circulation.       Please see dictated Radiology note for further details  R ICA complete occlusion with distal reconstitution of the lacerum-cavernous segment via the ophthalmic artery.   The left ICA run demonstrated contralateral slower filling of the right MCA/GARFIELD through the anterior communicating artery.   Severe right MCA tapering stenosis measuring 65% per WASID criteria  L proximal cervical ICA ulcerating plaque with pseudoaneurysm formation measuring 6.43 mm L x 3.65mm H x 5.01 mm N  Chronic occlusion of the right

## 2024-11-04 PROBLEM — I72.0 CAROTID PSEUDOANEURYSM (HCC): Status: ACTIVE | Noted: 2024-11-04

## 2024-11-12 ENCOUNTER — TELEPHONE (OUTPATIENT)
Dept: NEUROLOGY | Age: 59
End: 2024-11-12

## 2024-11-12 VITALS — HEIGHT: 61 IN | WEIGHT: 150 LBS | BODY MASS INDEX: 28.32 KG/M2

## 2024-11-12 RX ORDER — ASPIRIN/CALCIUM/MAG/ALUMINUM 325 MG
325 TABLET ORAL DAILY
Status: ON HOLD | COMMUNITY
End: 2024-11-15 | Stop reason: HOSPADM

## 2024-11-12 RX ORDER — NICOTINE 21 MG/24HR
1 PATCH, TRANSDERMAL 24 HOURS TRANSDERMAL EVERY 24 HOURS
COMMUNITY

## 2024-11-12 NOTE — TELEPHONE ENCOUNTER
Received call that anesthesia was not available on 11/14, I spoke with Dr. Delacruz and he advised we move up patients time to 8am arrival 6:30am, he will perform under MAC with his NAN nurses, times was changed with Eulalia in IR and patient's daughter was informed and aware she now needs to go to the heart and vascular center.

## 2024-11-13 NOTE — TELEPHONE ENCOUNTER
Called patient to update on procedure arrival time/place. Per Isi/SHELBY Delacruz anesthesia want [facility] to take place [facility]. MAC anesthesia. She was informed to arrive at 6:00 am for an 8:00 am procedure and go to [facility]. Patient vocalized understanding.

## 2024-11-14 ENCOUNTER — HOSPITAL ENCOUNTER (OUTPATIENT)
Dept: INTERVENTIONAL RADIOLOGY/VASCULAR | Age: 59
Discharge: HOME | End: 2024-11-14

## 2024-11-14 ENCOUNTER — HOSPITAL ENCOUNTER (INPATIENT)
Dept: INTERVENTIONAL RADIOLOGY/VASCULAR | Age: 59
LOS: 1 days | Discharge: HOME | DRG: 036 | End: 2024-11-15
Attending: PSYCHIATRY & NEUROLOGY | Admitting: PSYCHIATRY & NEUROLOGY
Payer: MEDICARE

## 2024-11-14 ENCOUNTER — ANESTHESIA EVENT (OUTPATIENT)
Dept: INTERVENTIONAL RADIOLOGY/VASCULAR | Age: 59
End: 2024-11-14
Payer: MEDICARE

## 2024-11-14 ENCOUNTER — ANESTHESIA (OUTPATIENT)
Dept: INTERVENTIONAL RADIOLOGY/VASCULAR | Age: 59
End: 2024-11-14
Payer: MEDICARE

## 2024-11-14 DIAGNOSIS — I65.21 OCCLUSION OF RIGHT CAROTID ARTERY: ICD-10-CM

## 2024-11-14 PROBLEM — I65.22 INTERNAL CAROTID ARTERY OCCLUSION, LEFT: Status: ACTIVE | Noted: 2024-11-14

## 2024-11-14 LAB
ABO + RH BLD: NORMAL
ACT BLD: 123 SEC (ref 79–149)
ARM BAND NUMBER: NORMAL
BLOOD BANK SAMPLE EXPIRATION: NORMAL
BLOOD GROUP ANTIBODIES SERPL: NEGATIVE
BUN BLD-MCNC: 20 MG/DL (ref 8–26)
CA-I BLD-SCNC: 1.18 MMOL/L (ref 1.15–1.33)
CHLORIDE BLD-SCNC: 106 MMOL/L (ref 98–107)
CLOSURE TME COLL+ADP BLD: >300 SEC (ref 67–112)
CO2 BLD CALC-SCNC: 23 MMOL/L (ref 22–30)
COLLAGEN EPINEPHRINE TIME: >300 SEC (ref 85–172)
EGFR, POC: 73 ML/MIN/1.73M2
GLUCOSE BLD-MCNC: 235 MG/DL (ref 65–105)
GLUCOSE BLD-MCNC: 370 MG/DL (ref 65–105)
GLUCOSE BLD-MCNC: 379 MG/DL (ref 65–105)
GLUCOSE BLD-MCNC: 425 MG/DL (ref 74–100)
HCO3 VENOUS: 23.3 MMOL/L (ref 22–29)
HCT VFR BLD AUTO: 46 % (ref 36–46)
NEGATIVE BASE EXCESS, VEN: 0.7 MMOL/L (ref 0–2)
O2 SAT, VEN: 92.1 % (ref 60–85)
PCO2 VENOUS: 36 MM HG (ref 41–51)
PH VENOUS: 7.42 (ref 7.32–7.43)
PLATELET FUNCTION INTERP: ABNORMAL
PO2 VENOUS: 62.3 MM HG (ref 30–50)
POC ANION GAP: 11 MMOL/L (ref 7–16)
POC CREATININE: 0.9 MG/DL (ref 0.51–1.19)
POC HEMOGLOBIN (CALC): 15.6 G/DL (ref 12–16)
POC LACTIC ACID: 0.9 MMOL/L (ref 0.56–1.39)
POTASSIUM BLD-SCNC: 3.8 MMOL/L (ref 3.5–4.5)
SODIUM BLD-SCNC: 139 MMOL/L (ref 138–146)

## 2024-11-14 PROCEDURE — 2580000003 HC RX 258

## 2024-11-14 PROCEDURE — 86850 RBC ANTIBODY SCREEN: CPT

## 2024-11-14 PROCEDURE — 6370000000 HC RX 637 (ALT 250 FOR IP): Performed by: ANESTHESIOLOGY

## 2024-11-14 PROCEDURE — 83605 ASSAY OF LACTIC ACID: CPT

## 2024-11-14 PROCEDURE — 3700000000 HC ANESTHESIA ATTENDED CARE

## 2024-11-14 PROCEDURE — 6370000000 HC RX 637 (ALT 250 FOR IP)

## 2024-11-14 PROCEDURE — 36415 COLL VENOUS BLD VENIPUNCTURE: CPT

## 2024-11-14 PROCEDURE — 80051 ELECTROLYTE PANEL: CPT

## 2024-11-14 PROCEDURE — B3171ZZ FLUOROSCOPY OF LEFT INTERNAL CAROTID ARTERY USING LOW OSMOLAR CONTRAST: ICD-10-PCS | Performed by: PSYCHIATRY & NEUROLOGY

## 2024-11-14 PROCEDURE — 82803 BLOOD GASES ANY COMBINATION: CPT

## 2024-11-14 PROCEDURE — 6360000004 HC RX CONTRAST MEDICATION

## 2024-11-14 PROCEDURE — 2000000000 HC ICU R&B

## 2024-11-14 PROCEDURE — C1769 GUIDE WIRE: HCPCS

## 2024-11-14 PROCEDURE — 85576 BLOOD PLATELET AGGREGATION: CPT

## 2024-11-14 PROCEDURE — 3700000001 HC ADD 15 MINUTES (ANESTHESIA)

## 2024-11-14 PROCEDURE — 86901 BLOOD TYPING SEROLOGIC RH(D): CPT

## 2024-11-14 PROCEDURE — 82565 ASSAY OF CREATININE: CPT

## 2024-11-14 PROCEDURE — 037L3DZ DILATION OF LEFT INTERNAL CAROTID ARTERY WITH INTRALUMINAL DEVICE, PERCUTANEOUS APPROACH: ICD-10-PCS | Performed by: PSYCHIATRY & NEUROLOGY

## 2024-11-14 PROCEDURE — 84520 ASSAY OF UREA NITROGEN: CPT

## 2024-11-14 PROCEDURE — 6360000002 HC RX W HCPCS

## 2024-11-14 PROCEDURE — 82330 ASSAY OF CALCIUM: CPT

## 2024-11-14 PROCEDURE — 86900 BLOOD TYPING SEROLOGIC ABO: CPT

## 2024-11-14 PROCEDURE — 37215 TRANSCATH STENT CCA W/EPS: CPT

## 2024-11-14 PROCEDURE — 82947 ASSAY GLUCOSE BLOOD QUANT: CPT

## 2024-11-14 PROCEDURE — 85014 HEMATOCRIT: CPT

## 2024-11-14 PROCEDURE — 85347 COAGULATION TIME ACTIVATED: CPT

## 2024-11-14 PROCEDURE — 2580000003 HC RX 258: Performed by: PSYCHIATRY & NEUROLOGY

## 2024-11-14 RX ORDER — ONDANSETRON 4 MG/1
4 TABLET, ORALLY DISINTEGRATING ORAL EVERY 8 HOURS PRN
Status: DISCONTINUED | OUTPATIENT
Start: 2024-11-14 | End: 2024-11-15 | Stop reason: HOSPADM

## 2024-11-14 RX ORDER — AMLODIPINE BESYLATE 10 MG/1
10 TABLET ORAL DAILY
Status: DISCONTINUED | OUTPATIENT
Start: 2024-11-14 | End: 2024-11-15 | Stop reason: HOSPADM

## 2024-11-14 RX ORDER — ALPRAZOLAM 0.5 MG
0.5 TABLET ORAL DAILY PRN
Status: DISCONTINUED | OUTPATIENT
Start: 2024-11-14 | End: 2024-11-15 | Stop reason: HOSPADM

## 2024-11-14 RX ORDER — GLUCAGON 1 MG/ML
1 KIT INJECTION PRN
Status: DISCONTINUED | OUTPATIENT
Start: 2024-11-14 | End: 2024-11-15 | Stop reason: HOSPADM

## 2024-11-14 RX ORDER — SODIUM CHLORIDE, SODIUM LACTATE, POTASSIUM CHLORIDE, CALCIUM CHLORIDE 600; 310; 30; 20 MG/100ML; MG/100ML; MG/100ML; MG/100ML
INJECTION, SOLUTION INTRAVENOUS
Status: DISCONTINUED | OUTPATIENT
Start: 2024-11-14 | End: 2024-11-14 | Stop reason: SDUPTHER

## 2024-11-14 RX ORDER — SODIUM CHLORIDE 0.9 % (FLUSH) 0.9 %
5-40 SYRINGE (ML) INJECTION EVERY 12 HOURS SCHEDULED
Status: DISCONTINUED | OUTPATIENT
Start: 2024-11-14 | End: 2024-11-15 | Stop reason: HOSPADM

## 2024-11-14 RX ORDER — SODIUM CHLORIDE 9 MG/ML
INJECTION, SOLUTION INTRAVENOUS PRN
OUTPATIENT
Start: 2024-11-14

## 2024-11-14 RX ORDER — DROPERIDOL 2.5 MG/ML
0.62 INJECTION, SOLUTION INTRAMUSCULAR; INTRAVENOUS
OUTPATIENT
Start: 2024-11-14 | End: 2024-11-15

## 2024-11-14 RX ORDER — GABAPENTIN 400 MG/1
1600 CAPSULE ORAL NIGHTLY
Status: DISCONTINUED | OUTPATIENT
Start: 2024-11-14 | End: 2024-11-15 | Stop reason: HOSPADM

## 2024-11-14 RX ORDER — ONDANSETRON 2 MG/ML
4 INJECTION INTRAMUSCULAR; INTRAVENOUS EVERY 6 HOURS PRN
Status: DISCONTINUED | OUTPATIENT
Start: 2024-11-14 | End: 2024-11-14 | Stop reason: SDUPTHER

## 2024-11-14 RX ORDER — DEXTROSE MONOHYDRATE 100 MG/ML
INJECTION, SOLUTION INTRAVENOUS CONTINUOUS PRN
Status: DISCONTINUED | OUTPATIENT
Start: 2024-11-14 | End: 2024-11-15 | Stop reason: HOSPADM

## 2024-11-14 RX ORDER — ASPIRIN 81 MG/1
81 TABLET, CHEWABLE ORAL DAILY
Status: DISCONTINUED | OUTPATIENT
Start: 2024-11-15 | End: 2024-11-15 | Stop reason: HOSPADM

## 2024-11-14 RX ORDER — METOCLOPRAMIDE HYDROCHLORIDE 5 MG/ML
10 INJECTION INTRAMUSCULAR; INTRAVENOUS
OUTPATIENT
Start: 2024-11-14 | End: 2024-11-15

## 2024-11-14 RX ORDER — HYDRALAZINE HYDROCHLORIDE 20 MG/ML
10 INJECTION INTRAMUSCULAR; INTRAVENOUS
OUTPATIENT
Start: 2024-11-14

## 2024-11-14 RX ORDER — SODIUM CHLORIDE 9 MG/ML
INJECTION, SOLUTION INTRAVENOUS PRN
Status: DISCONTINUED | OUTPATIENT
Start: 2024-11-14 | End: 2024-11-15 | Stop reason: HOSPADM

## 2024-11-14 RX ORDER — POTASSIUM CHLORIDE 1500 MG/1
40 TABLET, EXTENDED RELEASE ORAL PRN
Status: DISCONTINUED | OUTPATIENT
Start: 2024-11-14 | End: 2024-11-15 | Stop reason: HOSPADM

## 2024-11-14 RX ORDER — FENTANYL CITRATE 50 UG/ML
INJECTION, SOLUTION INTRAMUSCULAR; INTRAVENOUS
Status: DISCONTINUED | OUTPATIENT
Start: 2024-11-14 | End: 2024-11-14 | Stop reason: SDUPTHER

## 2024-11-14 RX ORDER — SODIUM CHLORIDE 9 MG/ML
INJECTION, SOLUTION INTRAVENOUS PRN
Status: DISCONTINUED | OUTPATIENT
Start: 2024-11-14 | End: 2024-11-14 | Stop reason: SDUPTHER

## 2024-11-14 RX ORDER — ONDANSETRON 2 MG/ML
4 INJECTION INTRAMUSCULAR; INTRAVENOUS EVERY 6 HOURS PRN
Status: DISCONTINUED | OUTPATIENT
Start: 2024-11-14 | End: 2024-11-15 | Stop reason: HOSPADM

## 2024-11-14 RX ORDER — SODIUM CHLORIDE 0.9 % (FLUSH) 0.9 %
5-40 SYRINGE (ML) INJECTION PRN
Status: DISCONTINUED | OUTPATIENT
Start: 2024-11-14 | End: 2024-11-14 | Stop reason: SDUPTHER

## 2024-11-14 RX ORDER — INSULIN LISPRO 100 [IU]/ML
0-16 INJECTION, SOLUTION INTRAVENOUS; SUBCUTANEOUS
Status: DISCONTINUED | OUTPATIENT
Start: 2024-11-14 | End: 2024-11-15 | Stop reason: HOSPADM

## 2024-11-14 RX ORDER — POLYETHYLENE GLYCOL 3350 17 G/17G
17 POWDER, FOR SOLUTION ORAL DAILY PRN
Status: DISCONTINUED | OUTPATIENT
Start: 2024-11-14 | End: 2024-11-15 | Stop reason: HOSPADM

## 2024-11-14 RX ORDER — MIDAZOLAM HYDROCHLORIDE 1 MG/ML
INJECTION, SOLUTION INTRAMUSCULAR; INTRAVENOUS
Status: DISCONTINUED | OUTPATIENT
Start: 2024-11-14 | End: 2024-11-14 | Stop reason: SDUPTHER

## 2024-11-14 RX ORDER — INSULIN LISPRO 100 [IU]/ML
0-8 INJECTION, SOLUTION INTRAVENOUS; SUBCUTANEOUS
Status: DISCONTINUED | OUTPATIENT
Start: 2024-11-14 | End: 2024-11-14

## 2024-11-14 RX ORDER — DIPHENHYDRAMINE HYDROCHLORIDE 50 MG/ML
12.5 INJECTION INTRAMUSCULAR; INTRAVENOUS
OUTPATIENT
Start: 2024-11-14 | End: 2024-11-15

## 2024-11-14 RX ORDER — CLINDAMYCIN PHOSPHATE 150 MG/ML
INJECTION, SOLUTION INTRAVENOUS
Status: DISCONTINUED | OUTPATIENT
Start: 2024-11-14 | End: 2024-11-14 | Stop reason: SDUPTHER

## 2024-11-14 RX ORDER — POTASSIUM CHLORIDE 7.45 MG/ML
10 INJECTION INTRAVENOUS PRN
Status: DISCONTINUED | OUTPATIENT
Start: 2024-11-14 | End: 2024-11-15 | Stop reason: HOSPADM

## 2024-11-14 RX ORDER — SODIUM CHLORIDE 0.9 % (FLUSH) 0.9 %
5-40 SYRINGE (ML) INJECTION PRN
OUTPATIENT
Start: 2024-11-14

## 2024-11-14 RX ORDER — SODIUM CHLORIDE 0.9 % (FLUSH) 0.9 %
5-40 SYRINGE (ML) INJECTION EVERY 12 HOURS SCHEDULED
Status: DISCONTINUED | OUTPATIENT
Start: 2024-11-14 | End: 2024-11-14 | Stop reason: SDUPTHER

## 2024-11-14 RX ORDER — IODIXANOL 270 MG/ML
100 INJECTION, SOLUTION INTRAVASCULAR
Status: COMPLETED | OUTPATIENT
Start: 2024-11-14 | End: 2024-11-14

## 2024-11-14 RX ORDER — INSULIN LISPRO 100 [IU]/ML
10 INJECTION, SOLUTION INTRAVENOUS; SUBCUTANEOUS ONCE
Status: COMPLETED | OUTPATIENT
Start: 2024-11-14 | End: 2024-11-14

## 2024-11-14 RX ORDER — SODIUM CHLORIDE 0.9 % (FLUSH) 0.9 %
5-40 SYRINGE (ML) INJECTION EVERY 12 HOURS SCHEDULED
OUTPATIENT
Start: 2024-11-14

## 2024-11-14 RX ORDER — ACETAMINOPHEN 650 MG/1
650 SUPPOSITORY RECTAL EVERY 6 HOURS PRN
Status: DISCONTINUED | OUTPATIENT
Start: 2024-11-14 | End: 2024-11-15 | Stop reason: HOSPADM

## 2024-11-14 RX ORDER — ONDANSETRON 4 MG/1
4 TABLET, ORALLY DISINTEGRATING ORAL EVERY 8 HOURS PRN
Status: DISCONTINUED | OUTPATIENT
Start: 2024-11-14 | End: 2024-11-14 | Stop reason: SDUPTHER

## 2024-11-14 RX ORDER — MAGNESIUM SULFATE IN WATER 40 MG/ML
2000 INJECTION, SOLUTION INTRAVENOUS PRN
Status: DISCONTINUED | OUTPATIENT
Start: 2024-11-14 | End: 2024-11-15 | Stop reason: HOSPADM

## 2024-11-14 RX ORDER — NALOXONE HYDROCHLORIDE 0.4 MG/ML
INJECTION, SOLUTION INTRAMUSCULAR; INTRAVENOUS; SUBCUTANEOUS PRN
OUTPATIENT
Start: 2024-11-14

## 2024-11-14 RX ORDER — CLOPIDOGREL BISULFATE 75 MG/1
75 TABLET ORAL DAILY
Status: DISCONTINUED | OUTPATIENT
Start: 2024-11-15 | End: 2024-11-15 | Stop reason: HOSPADM

## 2024-11-14 RX ORDER — SODIUM CHLORIDE 0.9 % (FLUSH) 0.9 %
5-40 SYRINGE (ML) INJECTION PRN
Status: DISCONTINUED | OUTPATIENT
Start: 2024-11-14 | End: 2024-11-15 | Stop reason: HOSPADM

## 2024-11-14 RX ORDER — ACETAMINOPHEN 325 MG/1
650 TABLET ORAL EVERY 6 HOURS PRN
Status: DISCONTINUED | OUTPATIENT
Start: 2024-11-14 | End: 2024-11-15 | Stop reason: HOSPADM

## 2024-11-14 RX ADMIN — GABAPENTIN 1600 MG: 400 CAPSULE ORAL at 22:07

## 2024-11-14 RX ADMIN — INSULIN LISPRO 16 UNITS: 100 INJECTION, SOLUTION INTRAVENOUS; SUBCUTANEOUS at 21:37

## 2024-11-14 RX ADMIN — MIDAZOLAM 1 MG: 1 INJECTION INTRAMUSCULAR; INTRAVENOUS at 08:40

## 2024-11-14 RX ADMIN — SODIUM CHLORIDE, PRESERVATIVE FREE 10 ML: 5 INJECTION INTRAVENOUS at 10:06

## 2024-11-14 RX ADMIN — FENTANYL CITRATE 50 MCG: 50 INJECTION, SOLUTION INTRAMUSCULAR; INTRAVENOUS at 08:43

## 2024-11-14 RX ADMIN — AMLODIPINE BESYLATE 10 MG: 10 TABLET ORAL at 14:15

## 2024-11-14 RX ADMIN — MIDAZOLAM 1 MG: 1 INJECTION INTRAMUSCULAR; INTRAVENOUS at 08:30

## 2024-11-14 RX ADMIN — ALPRAZOLAM 0.5 MG: 0.5 TABLET ORAL at 17:32

## 2024-11-14 RX ADMIN — INSULIN LISPRO 8 UNITS: 100 INJECTION, SOLUTION INTRAVENOUS; SUBCUTANEOUS at 17:14

## 2024-11-14 RX ADMIN — INSULIN LISPRO 2 UNITS: 100 INJECTION, SOLUTION INTRAVENOUS; SUBCUTANEOUS at 12:22

## 2024-11-14 RX ADMIN — IODIXANOL 48 ML: 270 INJECTION, SOLUTION INTRAVASCULAR at 09:19

## 2024-11-14 RX ADMIN — SODIUM CHLORIDE, PRESERVATIVE FREE 10 ML: 5 INJECTION INTRAVENOUS at 21:38

## 2024-11-14 RX ADMIN — INSULIN LISPRO 10 UNITS: 100 INJECTION, SOLUTION INTRAVENOUS; SUBCUTANEOUS at 08:05

## 2024-11-14 RX ADMIN — SODIUM CHLORIDE, POTASSIUM CHLORIDE, SODIUM LACTATE AND CALCIUM CHLORIDE: 600; 310; 30; 20 INJECTION, SOLUTION INTRAVENOUS at 08:23

## 2024-11-14 RX ADMIN — CLINDAMYCIN PHOSPHATE 900 MG: 150 INJECTION, SOLUTION INTRAVENOUS at 08:35

## 2024-11-14 RX ADMIN — FENTANYL CITRATE 50 MCG: 50 INJECTION, SOLUTION INTRAMUSCULAR; INTRAVENOUS at 08:35

## 2024-11-14 ASSESSMENT — LIFESTYLE VARIABLES: SMOKING_STATUS: 1

## 2024-11-14 ASSESSMENT — PAIN SCALES - GENERAL
PAINLEVEL_OUTOF10: 0
PAINLEVEL_OUTOF10: 0

## 2024-11-14 ASSESSMENT — PAIN - FUNCTIONAL ASSESSMENT: PAIN_FUNCTIONAL_ASSESSMENT: NONE - DENIES PAIN

## 2024-11-14 NOTE — H&P
input(s): \"WBC\", \"RBC\", \"HGB\", \"HCT\", \"MCV\", \"MCH\", \"MCHC\", \"RDW\", \"PLT\", \"MPV\" in the last 72 hours.       ENDOCRINE:  - Continue to monitor blood glucose, goal <180  - DM2  -Insulin sliding scale     No results for input(s): \"GLUCOSE\" in the last 72 hours.    OTHER:  - PT/OT/ST   - Code Status: Full    PROPHYLAXIS:      DVT PROPHYLAXIS:  - SCD sleeves - Thigh High   -Aspirin  -Plavix      DISPOSITION: To remain in ICU, anticipate DC tomorrow       Rocco Blount DO  Neuro Critical Care Service   Pager [phone]  11/14/2024     11:42 AM

## 2024-11-14 NOTE — PLAN OF CARE
Problem: Chronic Conditions and Co-morbidities  Goal: Patient's chronic conditions and co-morbidity symptoms are monitored and maintained or improved  Outcome: Progressing  Flowsheets (Taken 11/14/2024 0948)  Care Plan - Patient's Chronic Conditions and Co-Morbidity Symptoms are Monitored and Maintained or Improved: Monitor and assess patient's chronic conditions and comorbid symptoms for stability, deterioration, or improvement     Problem: Discharge Planning  Goal: Discharge to home or other facility with appropriate resources  Outcome: Progressing  Flowsheets (Taken 11/14/2024 0948)  Discharge to home or other facility with appropriate resources: Identify barriers to discharge with patient and caregiver     Problem: Skin/Tissue Integrity  Goal: Absence of new skin breakdown  Description: 1.  Monitor for areas of redness and/or skin breakdown  2.  Assess vascular access sites hourly  3.  Every 4-6 hours minimum:  Change oxygen saturation probe site  4.  Every 4-6 hours:  If on nasal continuous positive airway pressure, respiratory therapy assess nares and determine need for appliance change or resting period.  Outcome: Progressing

## 2024-11-14 NOTE — ANESTHESIA PRE PROCEDURE
TABS tablet Take 1 tablet by mouth daily (Patient not taking: Reported on 10/18/2024)     • aspirin 81 MG EC tablet Take 1 tablet by mouth daily (Patient not taking: Reported on 11/14/2024)       No current facility-administered medications for this encounter.       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.0       Past Medical History:        Diagnosis Date   • Anxiety    • Arthritis     general   • Bipolar 1 disorder ([facility])    • COPD (chronic obstructive pulmonary disease) ([facility])    • CVA (cerebral vascular accident) ([facility])    • Diabetes mellitus ([facility])     type 2   • ENOCH (generalized anxiety disorder)    • Hyperlipidemia    • Hypertension    • Osteopenia    • Personal history of other medical treatment     neck goiters   • Polyneuropathy    • Seasonal allergies    • Under care of team     Dr. Laura Pain management/ [place], [place]/ last visit 11/2024   • Under care of team     Neuro Vasc Dr. Delacruz / [place]/ last seen 10-18-24   • Wears glasses    • Wellness examination     PCP  Virginia Ramires NP/ [place], [place]/ last seen 10-16-24       Past Surgical History:        Procedure Laterality Date   • ABDOMINAL EXPLORATION SURGERY     • COLONOSCOPY     • FINGER TRIGGER RELEASE     • HERNIA REPAIR     • TUBAL LIGATION         Social History:    Social History     Tobacco Use   • Smoking status: Every Day     Current packs/day: 1.00     Average packs/day: 1 pack/day for 42.0 years (42.0 ttl pk-yrs)     Types: Cigarettes     Passive

## 2024-11-14 NOTE — BRIEF OP NOTE
[facility]    NEUROENDOVASCULAR SERVICE: POST-OP NOTE: 11/14/2024    Pt Name: Violeta Hawk  MRN: [id number]  YOB: 1965  Date of Procedure: 11/14/2024  Primary Care Physician: Virginia Ramires, APRN - NP        Pre-Procedural Diagnosis:L proximal cervical ICA ulcerating plaque with pseudoaneurysm   Post-Procedural Diagnosis:s/p carotid artery stenting and balloon angioplasty       Procedure Performed:cerebral angiogram with cervical carotid stenting and balloon angioplasty with distal embolic protection device    Surgeon:   Emil Norris MD    Fellow:  Rosemarie Gomez MD and Torsten Robin MD PhD     Assisting Tech:  Yissel Gomez    PRE-PROCEDURAL EXAM:  Neurological exam performed and unchanged from initial H&P or consult      Anesthesia: MAC anesthesia  An Immediate re-assessment was completed prior to sedation, and it is determined to be safe to proceed.  Complications: none    Intra-Operative EXAM:  Patient sedated with unchanged limited neurological exam    EBL: < Minimal      Cc            Specimens: Were not Obtained  Contrast:     Visipaque 270 low osmolar 48 Cc             Fluoro: 9.8 min    Findings:  Please see dictated Radiology note for further details  L proximal cervical ICA ulcerating plaque with pseudoaneurysm formation measuring 6.43 mm L x 3.65mm H x 5.01 mm N   The above lesion was treated with 6Fr Cook, Emboshiarul Nav6, Wallstent 10x37 ,   Post angioplasty, stenosis is less than 10% with contrast stagnation in the aneurysmal sac.             821a  847a  910a    POST-PROCEDURAL EXAM :   Stable neurological Exam  Neurological exam performed and unchanged from initial H&P or consult    Closure:  right Angioseal 6   F        POST-PROCEDURAL MONITORING : see orders  Disposition: Neuro ICU      Recommendations:  Back to Neuro ICU  Do not bend right leg for 3 hours.  Groin checks per protocol.  Peripheral pulse checks per protocol.  SBP goal

## 2024-11-14 NOTE — ANESTHESIA POSTPROCEDURE EVALUATION
Department of Anesthesiology  Postprocedure Note    Patient: Violeta Hawk  MRN: [id number]  YOB: 1965  Date of evaluation: 11/14/2024    Procedure Summary       Date: 11/14/24 Room / Location: [facility]    Anesthesia Start: 0823 Anesthesia Stop: 0942    Procedure: IR ANGIOGRAM CAROTID CEREBRAL BILATERAL Diagnosis:       Occlusion of right carotid artery      Internal carotid artery occlusion, left      (Carotid stent)    Scheduled Providers: Enid Gauthier APRN - CRNA Responsible Provider: Akin Michaud MD    Anesthesia Type: general, MAC ASA Status: 3            Anesthesia Type: No value filed.    Anand Phase I:      Anand Phase II:      Anesthesia Post Evaluation    Patient location during evaluation: bedside  Patient participation: complete - patient participated  Level of consciousness: awake and alert  Airway patency: patent  Nausea & Vomiting: no nausea and no vomiting  Cardiovascular status: hemodynamically stable  Respiratory status: acceptable  Hydration status: stable  Comments: BP (!) 160/72   Pulse 78   Temp 97.7 °F (36.5 °C) (Temporal)   Resp 18   Ht 1.549 m (5' 1\")   Wt 68 kg (150 lb)   SpO2 96%   BMI 28.34 kg/m²     Pain management: adequate    No notable events documented.

## 2024-11-14 NOTE — SEDATION DOCUMENTATION
Pre procedure assessment:    Pt awake/ alert/ oriented x 3.  Voice clear and appropriate.  Pt self identified/ same as ID band.  Pt denies discomfort, denies disabilities at this time.    Resp even/ non labored.  Skin pink, warm, dry, cap refill < 3 sec.    Face symmetric, pupils PERRLA @ 4 mm.  Pt denies ANY vision deficits, none noted.    Pt moves all extremities.  Good pulse, motor, sensory x 4.    Pedal pulses bilaterally palp moderate and marked.

## 2024-11-14 NOTE — SEDATION DOCUMENTATION
Closure Time:    Angio Seal deployed  All instrumentation removed  Manual pressure held at puncture site by Dr Robin

## 2024-11-14 NOTE — H&P
Endovascular Neurosurgery Clinic Note      Reason for evaluation: Right ICA occlusion, L ICA proximal pseudoaneurysm    SUBJECTIVE:     Presents for elective EVN treatment of her L ICA proximal pseudoaneurysm as scheduled. No new complaints or deficits. Is on Aspirin and Plavix daily.     History of Chief Complaint:      The patient is a 59 y.o. female HTN, smoker, TIAs, who presents as a transfer from [facility] facility for transient left facial droop and left-sided weakness     Patient states that she noticed having shaking of her lower extremity for about 10 minutes and then noticed having left leg weakness for about an hour until subsided on its own, she states that she had TIAs in the past with left-sided weakness, should be taken aspirin but states that she has not been taking for the past week or so, she was seen at [facility] with an initial blood pressure of 200s, was given a dose of labetalol to draw blood pressure, stroke team were consulted.  CT head was unremarkable for acute abnormalities.  However did show hypodensity at the right frontal right parietal lobes, remote lacunar infarct at the right caudate head.  CTA head and neck per report was showing a right ICA stenosis with reconstitution from ACOM.  Decision was to start patient on IV heparin and he will receive a loading dose of aspirin 324 mg p.o. once prior to arrival.  Blood pressure over here was 180s.  Patient was noted to ambulate, NIH of 2 due to right inferior quadrantanopsia and slurred speech.  Did have some weakness at the lower extremity noted on exam.      Allergies  is allergic to amoxicillin, lisinopril, and ziprasidone hcl.  Medications  Prior to Admission medications    Medication Sig Start Date End Date Taking? Authorizing Provider   Aspirin Fady MaldonadoAhCek-XvJtq-AeJrk, (BUFFERED ASPIRIN) 325 MG TABS Take 1 tablet by mouth Daily    Provider, MD Charley   nicotine (NICODERM CQ) 21 MG/24HR Place 1 patch onto the skin every 24  management and treatment of carotid stenosis. Shared decision making interaction included discussion of all treatments to include CEA, BRENDA, TCAR, and optimal medical therapy. Explanation of risks and benefits for each option specific to patient's clinical situation were discussed and clinical guidelines were integrated. The patient's personal preferences and priorities were incorporated in the discussion for treatment plan. After discussion, patient and family opted to proceed with stenting of the carotid artery.        Case discussed with Dr. Nubia Robin MD   Stroke, Neurocritical Care & Neurointervention  [facility]  [facility]  [facility] [place]  Electronically signed 11/14/2024 at 7:04 AM

## 2024-11-15 VITALS
SYSTOLIC BLOOD PRESSURE: 162 MMHG | TEMPERATURE: 98 F | OXYGEN SATURATION: 95 % | DIASTOLIC BLOOD PRESSURE: 97 MMHG | BODY MASS INDEX: 28.32 KG/M2 | HEIGHT: 61 IN | HEART RATE: 86 BPM | WEIGHT: 150 LBS | RESPIRATION RATE: 21 BRPM

## 2024-11-15 PROBLEM — I65.21 OCCLUSION OF RIGHT CAROTID ARTERY: Status: ACTIVE | Noted: 2024-11-15

## 2024-11-15 LAB
ANION GAP SERPL CALCULATED.3IONS-SCNC: 10 MMOL/L (ref 9–16)
BASOPHILS # BLD: 0.07 K/UL (ref 0–0.2)
BASOPHILS # BLD: 0.08 K/UL (ref 0–0.2)
BASOPHILS NFR BLD: 1 % (ref 0–2)
BASOPHILS NFR BLD: 1 % (ref 0–2)
BUN SERPL-MCNC: 17 MG/DL (ref 8–23)
CALCIUM SERPL-MCNC: 8.9 MG/DL (ref 8.6–10.4)
CHLORIDE SERPL-SCNC: 105 MMOL/L (ref 98–107)
CO2 SERPL-SCNC: 23 MMOL/L (ref 20–31)
CREAT SERPL-MCNC: 0.9 MG/DL (ref 0.6–0.9)
EOSINOPHIL # BLD: 0.28 K/UL (ref 0–0.44)
EOSINOPHIL # BLD: 0.36 K/UL (ref 0–0.44)
EOSINOPHILS RELATIVE PERCENT: 2 % (ref 1–4)
EOSINOPHILS RELATIVE PERCENT: 2 % (ref 1–4)
ERYTHROCYTE [DISTWIDTH] IN BLOOD BY AUTOMATED COUNT: 14.1 % (ref 11.8–14.4)
ERYTHROCYTE [DISTWIDTH] IN BLOOD BY AUTOMATED COUNT: 14.3 % (ref 11.8–14.4)
GFR, ESTIMATED: 73 ML/MIN/1.73M2
GLUCOSE BLD-MCNC: 321 MG/DL (ref 65–105)
GLUCOSE SERPL-MCNC: 172 MG/DL (ref 74–99)
HCT VFR BLD AUTO: 33.6 % (ref 36.3–47.1)
HCT VFR BLD AUTO: 33.8 % (ref 36.3–47.1)
HGB BLD-MCNC: 11.4 G/DL (ref 11.9–15.1)
HGB BLD-MCNC: 11.8 G/DL (ref 11.9–15.1)
IMM GRANULOCYTES # BLD AUTO: 0.05 K/UL (ref 0–0.3)
IMM GRANULOCYTES # BLD AUTO: 0.06 K/UL (ref 0–0.3)
IMM GRANULOCYTES NFR BLD: 0 %
IMM GRANULOCYTES NFR BLD: 0 %
LYMPHOCYTES NFR BLD: 3.81 K/UL (ref 1.1–3.7)
LYMPHOCYTES NFR BLD: 4.59 K/UL (ref 1.1–3.7)
LYMPHOCYTES RELATIVE PERCENT: 26 % (ref 24–43)
LYMPHOCYTES RELATIVE PERCENT: 31 % (ref 24–43)
MCH RBC QN AUTO: 29.1 PG (ref 25.2–33.5)
MCH RBC QN AUTO: 30.5 PG (ref 25.2–33.5)
MCHC RBC AUTO-ENTMCNC: 33.7 G/DL (ref 28.4–34.8)
MCHC RBC AUTO-ENTMCNC: 35.1 G/DL (ref 28.4–34.8)
MCV RBC AUTO: 86.2 FL (ref 82.6–102.9)
MCV RBC AUTO: 86.8 FL (ref 82.6–102.9)
MONOCYTES NFR BLD: 0.94 K/UL (ref 0.1–1.2)
MONOCYTES NFR BLD: 1.09 K/UL (ref 0.1–1.2)
MONOCYTES NFR BLD: 6 % (ref 3–12)
MONOCYTES NFR BLD: 7 % (ref 3–12)
NEUTROPHILS NFR BLD: 59 % (ref 36–65)
NEUTROPHILS NFR BLD: 65 % (ref 36–65)
NEUTS SEG NFR BLD: 8.75 K/UL (ref 1.5–8.1)
NEUTS SEG NFR BLD: 9.43 K/UL (ref 1.5–8.1)
NRBC BLD-RTO: 0 PER 100 WBC
NRBC BLD-RTO: 0 PER 100 WBC
PLATELET # BLD AUTO: 258 K/UL (ref 138–453)
PLATELET # BLD AUTO: 260 K/UL (ref 138–453)
PMV BLD AUTO: 10.5 FL (ref 8.1–13.5)
PMV BLD AUTO: 10.6 FL (ref 8.1–13.5)
POTASSIUM SERPL-SCNC: 3.7 MMOL/L (ref 3.7–5.3)
RBC # BLD AUTO: 3.87 M/UL (ref 3.95–5.11)
RBC # BLD AUTO: 3.92 M/UL (ref 3.95–5.11)
SODIUM SERPL-SCNC: 138 MMOL/L (ref 136–145)
WBC OTHER # BLD: 14.6 K/UL (ref 3.5–11.3)
WBC OTHER # BLD: 14.9 K/UL (ref 3.5–11.3)

## 2024-11-15 PROCEDURE — 80048 BASIC METABOLIC PNL TOTAL CA: CPT

## 2024-11-15 PROCEDURE — 6370000000 HC RX 637 (ALT 250 FOR IP)

## 2024-11-15 PROCEDURE — 97161 PT EVAL LOW COMPLEX 20 MIN: CPT

## 2024-11-15 PROCEDURE — 2580000003 HC RX 258: Performed by: PSYCHIATRY & NEUROLOGY

## 2024-11-15 PROCEDURE — 99223 1ST HOSP IP/OBS HIGH 75: CPT | Performed by: PSYCHIATRY & NEUROLOGY

## 2024-11-15 PROCEDURE — 85025 COMPLETE CBC W/AUTO DIFF WBC: CPT

## 2024-11-15 PROCEDURE — 82947 ASSAY GLUCOSE BLOOD QUANT: CPT

## 2024-11-15 RX ADMIN — AMLODIPINE BESYLATE 10 MG: 10 TABLET ORAL at 08:14

## 2024-11-15 RX ADMIN — CLOPIDOGREL BISULFATE 75 MG: 75 TABLET ORAL at 08:14

## 2024-11-15 RX ADMIN — INSULIN LISPRO 12 UNITS: 100 INJECTION, SOLUTION INTRAVENOUS; SUBCUTANEOUS at 10:28

## 2024-11-15 RX ADMIN — ASPIRIN 81 MG 81 MG: 81 TABLET ORAL at 08:14

## 2024-11-15 RX ADMIN — SODIUM CHLORIDE, PRESERVATIVE FREE 10 ML: 5 INJECTION INTRAVENOUS at 08:14

## 2024-11-15 ASSESSMENT — PAIN SCALES - GENERAL
PAINLEVEL_OUTOF10: 0

## 2024-11-15 NOTE — CARE COORDINATION
Case Management Assessment  Initial Evaluation    Date/Time of Evaluation: 11/15/2024 2:51 PM  Assessment Completed by: Brittany Clarke RN    If patient is discharged prior to next notation, then this note serves as note for discharge by case management.    Patient Name: Violeta Hawk                   YOB: 1965  Diagnosis: Internal carotid artery occlusion, left [I65.22]                   Date / Time: 11/14/2024  9:05 AM    Patient Admission Status: Inpatient   Readmission Risk (Low < 19, Mod (19-27), High > 27): Readmission Risk Score: 12.4    Current PCP: Virginia Ramires APRN - NP  PCP verified by CM? Yes    Chart Reviewed: Yes      History Provided by: Patient  Patient Orientation: Alert and Oriented    Patient Cognition: Alert    Hospitalization in the last 30 days (Readmission):  No    If yes, Readmission Assessment in  Navigator will be completed.    Advance Directives:      Code Status: Full Code   Patient's Primary Decision Maker is: Legal Next of Kin      Discharge Planning:    Patient lives with: Family Members Type of Home: House  Primary Care Giver: Self  Patient Support Systems include: Family Members, Children, Friends/Neighbors   Current Financial resources: Medicare  Current community resources: None  Current services prior to admission: None            Current DME:              Type of Home Care services:  PT, OT    ADLS  Prior functional level: Independent in ADLs/IADLs  Current functional level: Independent in ADLs/IADLs    PT AM-PAC:   /24  OT AM-PAC:   /24    Family can provide assistance at DC: Yes  Would you like Case Management to discuss the discharge plan with any other family members/significant others, and if so, who? No  Plans to Return to Present Housing: Yes  Other Identified Issues/Barriers to RETURNING to current housing: none  Potential Assistance needed at discharge: Outpatient PT/OT            Potential DME:    Patient expects to discharge to: [facility]  Plan for

## 2024-11-15 NOTE — PLAN OF CARE
Problem: Chronic Conditions and Co-morbidities  Goal: Patient's chronic conditions and co-morbidity symptoms are monitored and maintained or improved  Outcome: Progressing  Flowsheets (Taken 11/15/2024 0800)  Care Plan - Patient's Chronic Conditions and Co-Morbidity Symptoms are Monitored and Maintained or Improved: Monitor and assess patient's chronic conditions and comorbid symptoms for stability, deterioration, or improvement     Problem: Discharge Planning  Goal: Discharge to home or other facility with appropriate resources  Outcome: Progressing  Flowsheets (Taken 11/15/2024 0800)  Discharge to home or other facility with appropriate resources: Identify barriers to discharge with patient and caregiver     Problem: Skin/Tissue Integrity  Goal: Absence of new skin breakdown  Description: 1.  Monitor for areas of redness and/or skin breakdown  2.  Assess vascular access sites hourly  3.  Every 4-6 hours minimum:  Change oxygen saturation probe site  4.  Every 4-6 hours:  If on nasal continuous positive airway pressure, respiratory therapy assess nares and determine need for appliance change or resting period.  Outcome: Progressing

## 2024-11-15 NOTE — PLAN OF CARE
Problem: Chronic Conditions and Co-morbidities  Goal: Patient's chronic conditions and co-morbidity symptoms are monitored and maintained or improved  11/14/2024 2357 by Ekta Wahl RN  Outcome: Progressing     Problem: Discharge Planning  Goal: Discharge to home or other facility with appropriate resources  11/14/2024 2357 by Ekta Wahl, RN  Outcome: Progressing     Problem: Skin/Tissue Integrity  Goal: Absence of new skin breakdown  Description: 1.  Monitor for areas of redness and/or skin breakdown  2.  Assess vascular access sites hourly  3.  Every 4-6 hours minimum:  Change oxygen saturation probe site  4.  Every 4-6 hours:  If on nasal continuous positive airway pressure, respiratory therapy assess nares and determine need for appliance change or resting period.  11/14/2024 2357 by Ekta Wahl, RN  Outcome: Progressing     Problem: ABCDS Injury Assessment  Goal: Absence of physical injury  Outcome: Progressing     Problem: Safety - Adult  Goal: Free from fall injury  Outcome: Progressing

## 2024-11-15 NOTE — DISCHARGE INSTRUCTIONS
Please follow-up with your primary care provider to discuss blood sugar control and if any medication adjustments are needed.  Please continue to take all home medications as prescribed.  You will continue to take a daily baby aspirin as well as Plavix.  He will follow-up with the endovascular team in 8 weeks as well as 3 to 4 months.  Please return to the nearest ED with any new or worsening symptoms or concerns including swelling, pain in the groin area where the insertion site was, altered sensation including numbness, tingling, weakness, slurred speech, altered mentation, chest pain, shortness of breath, feeling like in a pass out, passing out, or any other concerns.

## 2024-11-15 NOTE — PROGRESS NOTES
Endovascular Neurosurgery Progress Note      Reason for evaluation: Right ICA occlusion, L ICA proximal pseudoaneurysm    SUBJECTIVE:     S/p carotid stent placement. No new complaints, no focal deficits.    History of Chief Complaint:      The patient is a 59 y.o. female HTN, smoker, TIAs, [facility] and left-sided weakness     Patient states that she noticed having shaking of her lower extremity for about 10 minutes and then noticed having left leg weakness for about an hour until subsided on its own, she states that she had TIAs in the past with left-sided weakness, should be taken aspirin but states that she has not been taking for the past week or so, she was seen at [facility] with an initial blood pressure of 200s, was given a dose of labetalol to draw blood pressure, stroke team were consulted.  CT head was unremarkable for acute abnormalities.  However did show hypodensity at the right frontal right parietal lobes, remote lacunar infarct at the right caudate head.  CTA head and neck per report was showing a right ICA stenosis with reconstitution from ACOM.  Decision was to start patient on IV heparin and he will receive a loading dose of aspirin 324 mg p.o. once prior to arrival.  Blood pressure over here was 180s.  Patient was noted to ambulate, NIH of 2 due to right inferior quadrantanopsia and slurred speech.  Did have some weakness at the lower extremity noted on exam.      Allergies  is allergic to amoxicillin, lisinopril, and ziprasidone hcl.  Medications  Prior to Admission medications    Medication Sig Start Date End Date Taking? Authorizing Provider   Aspirin Buf,IzJjv-XtCfd-AbEen, (BUFFERED ASPIRIN) 325 MG TABS Take 1 tablet by mouth Daily   Yes ProviderCharley MD   nicotine (NICODERM CQ) 21 MG/24HR Place 1 patch onto the skin every 24 hours   Yes ProviderCharley MD   ALPRAZolam (XANAX) 0.5 MG tablet Take 1 
   Neuro ICU Progress Note    Patient Name: Violeta Hawk  Patient : 1965  Room[facility]Bed: [facility]2/0122-01  Code Status: Full  Allergies:   Allergies   Allergen Reactions    Amoxicillin Nausea Only    Lisinopril Other (See Comments)     Other Reaction(s): muscle cramps    Muscle cramps    Ziprasidone Hcl Other (See Comments) and Rash     MUSCLE WEAKNESS IN FACE       CHIEF COMPLAINT     No complaints    Interval History   Admitted: 24    59-year-old female with a past medical history including smoker, hypertension, TIAs who was originally seen approximately 1 month ago as a transfer from outside hospital for left sided weakness, left facial droop and was found to have right ICA occlusion.  Patient had diagnostic cerebral angiogram done at that time which showed chronic ICA occlusion with good contralateral collateral blood flow.  Patient was also found to have a left ICA proximal pseudoaneurysm.  Decision was made for follow-up for cerebral angiogram with left ICA pseudoaneurysm embolization.  Patient was taken to the angio suite and had left ICA stenting and balloon angioplasty with distal embolic protection device.    No acute events or issues overnight         PATIENT HISTORY   Past Medical History:        Diagnosis Date    Anxiety     Arthritis     general    Bipolar 1 disorder (HCC)     COPD (chronic obstructive pulmonary disease) (HCC)     CVA (cerebral vascular accident) (HCC)     Diabetes mellitus (HCC)     type 2    ENOCH (generalized anxiety disorder)     Hyperlipidemia     Hypertension     Osteopenia     Personal history of other medical treatment     neck goiters    Polyneuropathy     Seasonal allergies     Under care of team     Dr. Laura [facility] management/ [place], [place]/ last visit 2024    Under care of team     Neuro Vasc Dr. Delacruz / [place]/ last seen 10-18-24    Wears glasses     Wellness examination     PCP  Virginia Ramires NP/ [place], [place]/ last seen 10-16-24       Past Surgical History:      
Dr Michaud notified of BS, orders received  
Independent  Stand to Sit: Independent  Stand Pivot Transfers: Independent  Ambulation  Surface: Level tile  Device: No Device  Assistance: Independent  Gait Deviations: Slow [place]  Distance: 240'x1  More Ambulation?: No  Stairs/Curb  Stairs?: No (pt declined stair ambulation as it normally aggravates her back pain)     Balance  Posture: Good  Sitting - Static: Good  Sitting - Dynamic: Good  Standing - Static: Good  Standing - Dynamic: Good     AM-PAC - Mobility         AM-PAC Mobility without Stair Climbing Inpatient   How much difficulty turning over in bed?: None  How much difficulty sitting down on / standing up from a chair with arms?: None  How much difficulty moving from lying on back to sitting on side of bed?: None  How much help from another person moving to and from a bed to a chair?: None  How much help from another person needed to walk in hospital room?: None  AM-PAC Inpatient Mobility without Stair Climbing Raw Score : 20  AM-PAC Inpatient without Stair Climbing T-Scale Score : 60.57  Mobility Inpatient CMS 0-100% Score: 0  Mobility Inpatient without Stair CMS G-Code Modifier : CH    Goals  Short Term Goals  Time Frame for Short Term Goals: 1 visit  Short Term Goal 1: evaluate and give recommendations: pt is mobilizing independently; continued PT not necessary  Patient Goals   Patient Goals : go home today!       Education  Patient Education  Education Given To: Patient  Education Provided: Role of Therapy;Plan of Care  Education Method: Verbal  Barriers to Learning: None  Education Outcome: Verbalized understanding;Demonstrated understanding      Therapy Time   Individual Concurrent Group Co-treatment   Time In 1216         Time Out 1228         Minutes 12         Timed Code Treatment Minutes: 12 Minutes       Luis F Her, PT

## 2024-11-15 NOTE — DISCHARGE SUMMARY
Neuro Critical Care   Discharge Summary      PATIENT NAME: Violeta Hawk  YOB: 1965  MEDICAL RECORD NO. 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 yo female who presented to [facility] on 11/14/2024  9:05 AM for elective left ICA angioplasty and potential for stent placement.  Patient was originally seen as a stroke alert in August of this year for for transient left facial droop and left-sided weakness.  Patient was found to have left proximal cervical ICA ulcerating plaque with pseudoaneurysm formation and followed up outpatient with neuroendovascular.  Patient was admitted for this elective procedure and tolerated the procedure well with no changes in neurological exam after the procedure.  Patient was monitored closely in the neurointensive care unit and worked with physical therapy prior to discharge.    Labs and imaging were followed daily.  At time of discharge, Violeta Hawk was tolerating a ADULT DIET; Regular; 3 carb choices (45 gm/meal), having bowel movements, and is in stable condition to be discharged to home.        PROCEDURES:    Left ICA stenting and balloon angioplasty with distal embolic protective device    PHYSICAL EXAMINATION        Discharge Vitals:  height is 1.549 m (5' 1\") and weight is 68 kg (150 lb). Her tympanic temperature is 98 °F (36.7 °C). Her

## 2024-11-21 NOTE — SIGNIFICANT NOTE
Education provided the Patient on the following:    - Nothing to Eat or Drink after MN the night before the procedure    - Avoid red/purple fluids while completing their bowel prep as ordered by physician  -Contact Gastrointerologist office for any questions about specific details regarding colon prep    -You will need to have someone drive you home after your colonoscopy and remain with you for 24 hours after the procedure  - The date of your procedure, your are welcome to have one visitor at bedside or remain within 10-15 minutes of [facility]  -Please wear warm socks when you arrive for your procedure  -Remove all jewelry and leave any valuables before arriving the day of your procedure (all will have to be removed before leaving preop)  -You will need to arrive at 0945 on 11/22 procedure    -Feel free to contact us at: [phone] with any additional questions/concerns

## 2024-11-22 ENCOUNTER — HOSPITAL ENCOUNTER (OUTPATIENT)
Facility: SURGERY CENTER | Age: 59
Setting detail: HOSPITAL OUTPATIENT SURGERY
Discharge: HOME | End: 2024-11-22
Attending: INTERNAL MEDICINE | Admitting: INTERNAL MEDICINE
Payer: COMMERCIAL

## 2024-11-22 ENCOUNTER — ANESTHESIA EVENT (OUTPATIENT)
Dept: SURGERY | Facility: SURGERY CENTER | Age: 59
End: 2024-11-22
Payer: COMMERCIAL

## 2024-11-22 ENCOUNTER — ANESTHESIA (OUTPATIENT)
Dept: SURGERY | Facility: SURGERY CENTER | Age: 59
End: 2024-11-22
Payer: COMMERCIAL

## 2024-11-22 VITALS
BODY MASS INDEX: 26.29 KG/M2 | TEMPERATURE: 98.2 F | DIASTOLIC BLOOD PRESSURE: 77 MMHG | WEIGHT: 148.4 LBS | OXYGEN SATURATION: 93 % | RESPIRATION RATE: 16 BRPM | HEIGHT: 63 IN | SYSTOLIC BLOOD PRESSURE: 119 MMHG | HEART RATE: 100 BPM

## 2024-11-22 DIAGNOSIS — R63.4 ABNORMAL WEIGHT LOSS: ICD-10-CM

## 2024-11-22 DIAGNOSIS — R19.8 IRREGULAR BOWEL HABITS: ICD-10-CM

## 2024-11-22 DIAGNOSIS — K21.9 GASTROESOPHAGEAL REFLUX DISEASE WITHOUT ESOPHAGITIS: ICD-10-CM

## 2024-11-22 DIAGNOSIS — R19.7 DIARRHEA, UNSPECIFIED TYPE: ICD-10-CM

## 2024-11-22 DIAGNOSIS — K22.70 BARRETT'S ESOPHAGUS WITHOUT DYSPLASIA: ICD-10-CM

## 2024-11-22 PROCEDURE — 88305 TISSUE EXAM BY PATHOLOGIST: CPT | Performed by: INTERNAL MEDICINE

## 2024-11-22 PROCEDURE — 43239 EGD BIOPSY SINGLE/MULTIPLE: CPT | Performed by: INTERNAL MEDICINE

## 2024-11-22 PROCEDURE — 45380 COLONOSCOPY AND BIOPSY: CPT | Performed by: INTERNAL MEDICINE

## 2024-11-22 PROCEDURE — 25010000002 PROPOFOL 10 MG/ML EMULSION: Performed by: NURSE ANESTHETIST, CERTIFIED REGISTERED

## 2024-11-22 PROCEDURE — 25010000002 LIDOCAINE 1 % SOLUTION: Performed by: NURSE ANESTHETIST, CERTIFIED REGISTERED

## 2024-11-22 PROCEDURE — 25010000002 PROPOFOL 200 MG/20ML EMULSION: Performed by: NURSE ANESTHETIST, CERTIFIED REGISTERED

## 2024-11-22 RX ORDER — SODIUM CHLORIDE 0.9 % (FLUSH) 0.9 %
10 SYRINGE (ML) INJECTION AS NEEDED
Status: DISCONTINUED | OUTPATIENT
Start: 2024-11-22 | End: 2024-11-22 | Stop reason: HOSPADM

## 2024-11-22 RX ORDER — LIDOCAINE HYDROCHLORIDE 10 MG/ML
INJECTION, SOLUTION INFILTRATION; PERINEURAL AS NEEDED
Status: DISCONTINUED | OUTPATIENT
Start: 2024-11-22 | End: 2024-11-22 | Stop reason: SURG

## 2024-11-22 RX ORDER — PROPOFOL 10 MG/ML
INJECTION, EMULSION INTRAVENOUS AS NEEDED
Status: DISCONTINUED | OUTPATIENT
Start: 2024-11-22 | End: 2024-11-22 | Stop reason: SURG

## 2024-11-22 RX ORDER — SODIUM CHLORIDE 0.9 % (FLUSH) 0.9 %
3 SYRINGE (ML) INJECTION EVERY 12 HOURS SCHEDULED
Status: DISCONTINUED | OUTPATIENT
Start: 2024-11-22 | End: 2024-11-22 | Stop reason: HOSPADM

## 2024-11-22 RX ADMIN — LIDOCAINE HYDROCHLORIDE 30 MG: 10 INJECTION, SOLUTION INFILTRATION; PERINEURAL at 10:30

## 2024-11-22 RX ADMIN — PROPOFOL 200 MCG/KG/MIN: 10 INJECTION, EMULSION INTRAVENOUS at 10:30

## 2024-11-22 RX ADMIN — PROPOFOL 100 MG: 10 INJECTION, EMULSION INTRAVENOUS at 10:30

## 2024-11-22 NOTE — ANESTHESIA PREPROCEDURE EVALUATION
Anesthesia Evaluation     Patient summary reviewed and Nursing notes reviewed   no history of anesthetic complications:   NPO Solid Status: > 8 hours  NPO Liquid Status: > 8 hours           Airway   Mallampati: II  TM distance: >3 FB  Neck ROM: full  No difficulty expected  Dental - normal exam     Pulmonary    (+) a smoker Current, Abstained day of surgery, COPD,  (-) asthma, sleep apnea, rhonchi, decreased breath sounds, wheezes  Cardiovascular   Exercise tolerance: good (4-7 METS)    Rhythm: regular  Rate: normal    (+) hypertension, hyperlipidemia  (-) CAD, dysrhythmias, angina, OCONNOR, murmur      Neuro/Psych  (-) seizures, CVA  GI/Hepatic/Renal/Endo    (+) GERD  (-) liver disease, no renal disease, diabetes, no thyroid disorder    Musculoskeletal     Abdominal     Abdomen: soft.   Substance History      OB/GYN          Other                    Anesthesia Plan    ASA 2     MAC   total IV anesthesia  intravenous induction     Anesthetic plan, risks, benefits, and alternatives have been provided, discussed and informed consent has been obtained with: patient.    CODE STATUS:

## 2024-11-22 NOTE — H&P
No chief complaint on file.      HPI  Barretts  Gerd  Weight loss  diarrhea         Problem List:    Patient Active Problem List   Diagnosis    Allergic reaction    Carotid bruit    GERD (gastroesophageal reflux disease)    HLD (hyperlipidemia)    CTS (carpal tunnel syndrome)    Rash, skin    Vitamin D deficiency    Dyshidrotic eczema    Tobacco abuse    Family history of breast cancer gene mutation in first degree relative    Benign essential HTN    Blood pressure elevated without history of HTN    Stenosis of left carotid artery    Esophageal dysphagia    Gastroesophageal reflux disease    Benign hemangioma    Overweight with body mass index (BMI) 25.0-29.9    Essential hypertension    Tobacco user    Abnormal weight loss    Pelayo's esophagus without dysplasia    Diarrhea    Irregular bowel habits       Medical History:    Past Medical History:   Diagnosis Date    Allergic reaction     Pelayo esophagus     Benign essential hypertension     Bruit of left carotid artery     Bursitis of right hip     Carotid bruit     CTS (carpal tunnel syndrome)     left    Emphysema of lung     GERD (gastroesophageal reflux disease)     History of CT scan of abdomen 2011    and pelvis without contrast; left 5mm renal stone with hydronephrosis    History of Doppler ultrasound 2013    carotid; right is neg; left internal carotid with 1-15% plaque and in left bifurcation; has patent blood flow    History of transfusion     HLD (hyperlipidemia)     Kidney calculus     Quadruplet gestation     Rash, skin     SAB (spontaneous )     Sprain, strain     Vitamin D deficiency         Social History:    Social History     Socioeconomic History    Marital status:    Tobacco Use    Smoking status: Every Day     Current packs/day: 1.00     Average packs/day: 1 pack/day for 35.0 years (35.0 ttl pk-yrs)     Types: Cigarettes    Smokeless tobacco: Never    Tobacco comments:     rash with e cig  Caffeine use 1-2  sodas daily. Smokes 1ppd   Vaping Use    Vaping status: Never Used   Substance and Sexual Activity    Alcohol use: Yes     Comment: occ    Drug use: No    Sexual activity: Yes     Partners: Male     Birth control/protection: Vasectomy       Family History:   Family History   Problem Relation Age of Onset    Heart disease Mother     Hypertension Mother     Lung disease Mother     COPD Mother             Diabetes Mother             Hyperlipidemia Mother     Kidney disease Mother             Vision loss Mother     Stroke Mother     Diabetes Father     Anxiety disorder Sister     Hypertension Sister     Hyperlipidemia Sister     Breast cancer Sister 47        genetic testing neg     Cancer Sister     Arthritis Sister     Hyperlipidemia Sister     Hyperlipidemia Sister     Breast cancer Maternal Aunt 65        genetic testing negative     Thyroid disease Maternal Aunt         Hashimoto    Thyroid disease Maternal Aunt         Hashimoto    Irritable bowel syndrome Paternal Aunt     Heart disease Maternal Grandfather     Colon cancer Cousin 40        maternal     Colon polyps Neg Hx     Crohn's disease Neg Hx     Ulcerative colitis Neg Hx        Surgical History:   Past Surgical History:   Procedure Laterality Date     SECTION       SECTION      CHOLECYSTECTOMY      COLONOSCOPY  2018    ENDOSCOPY  2015    Dr. Goff; antral gastritis    ENDOSCOPY N/A 2020    Procedure: ESOPHAGOGASTRODUODENOSCOPY WITH COLD BIOPSIES, ARIAS DILATION 46FR;  Surgeon: Maite Rivera MD;  Location: [facility] ENDOSCOPY;  Service: Gastroenterology;  Laterality: N/A;  PRE- DYSPHAGIA, GERD  POST- IRREG Z LINE, GASTRITIS, R/O EOE    LITHOTRIPSY  2011    L ESWL       No current facility-administered medications for this encounter.    Current Outpatient Medications:     aspirin 81 MG tablet, Take 1 tablet by mouth Daily., Disp: , Rfl:     atorvastatin (LIPITOR) 80 MG tablet, Take 1  tablet by mouth Every Night. For cholesterol, Disp: 90 tablet, Rfl: 3    cetirizine (zyrTEC) 5 MG tablet, Take 1 tablet by mouth Daily., Disp: , Rfl:     Cholecalciferol (Vitamin D) 50 MCG (2000 UT) tablet, Take one tablet by mouth once daily., Disp: 90 tablet, Rfl: 1    Cyanocobalamin (Vitamin B-12) 1000 MCG sublingual tablet, DISSOLVE 1 TABLET UNDER THE TONGUE DAILY, Disp: 90 tablet, Rfl: 3    lansoprazole (PREVACID) 30 MG capsule, Take 1 capsule by mouth Daily. For GERD with Pelayo's esophagus, Disp: 90 capsule, Rfl: 3    lisinopril (PRINIVIL,ZESTRIL) 10 MG tablet, Take 1 tablet by mouth Daily. for blood pressure, Disp: 90 tablet, Rfl: 1    Multiple Vitamins-Minerals (MULTIVITAMIN WITH MINERALS) tablet, Take 1 tablet by mouth Daily., Disp: , Rfl:     Probiotic Product (PROBIOTIC BLEND PO), Take 1 Units by mouth Daily. Dick LOCKWOOD, Disp: , Rfl:     Allergies:   Allergies   Allergen Reactions    Amoxicillin-Pot Clavulanate GI Intolerance and Unknown - High Severity     No rash; can take cephalosporin    Levofloxacin Other (See Comments) and Unknown - High Severity     Joint pain    Ibuprofen Rash and Unknown - High Severity        The following portions of the patient's history were reviewed by me and updated as appropriate: review of systems, allergies, current medications, past family history, past medical history, past social history, past surgical history and problem list.    There were no vitals filed for this visit.    PHYSICAL EXAM:    CONSTITUTIONAL:  today's vital signs reviewed by me  GASTROINTESTINAL: abdomen is soft nontender nondistended with normal active bowel sounds, no masses are appreciated    Assessment/ Plan  Barretts  Gerd  Weight loss  Diarrhea    Egd and colonoscopy    Risks and benefits as well as alternatives to endoscopic evaluation were explained to the patient and they voiced understanding and wish to proceed.  These risks include but are not limited to the risk of bleeding, perforation,  adverse reaction to sedation, and missed lesions.  The patient was given the opportunity to ask questions prior to the endoscopic procedure.

## 2024-11-22 NOTE — ANESTHESIA POSTPROCEDURE EVALUATION
"Patient: Anabella Baum    Procedure Summary       Date: 11/22/24 Room / Location: [facility]  / [facility]    Anesthesia Start: 1026 Anesthesia Stop: 1057    Procedures:       ESOPHAGOGASTRODUODENOSCOPY      COLONOSCOPY TO CECUM Diagnosis:       Abnormal weight loss      Pelayo's esophagus without dysplasia      Gastroesophageal reflux disease without esophagitis      Diarrhea, unspecified type      Irregular bowel habits      (Abnormal weight loss [R63.4])      (Pelayo's esophagus without dysplasia [K22.70])      (Gastroesophageal reflux disease without esophagitis [K21.9])      (Diarrhea, unspecified type [R19.7])      (Irregular bowel habits [R19.8])    Surgeons: Luis Rea MD Provider: Ethan Martínez MD    Anesthesia Type: MAC ASA Status: 2            Anesthesia Type: MAC    Vitals  Vitals Value Taken Time   /77 11/22/24 1121   Temp 36.8 °C (98.2 °F) 11/22/24 1056   Pulse 100 11/22/24 1121   Resp 16 11/22/24 1121   SpO2 93 % 11/22/24 1121           Post Anesthesia Care and Evaluation    Level of consciousness: awake and alert  Pain management: adequate    Airway patency: patent  Anesthetic complications: No anesthetic complications  PONV Status: none  Cardiovascular status: acceptable  Respiratory status: acceptable  Hydration status: acceptable    Comments: /77 (BP Location: Left arm, Patient Position: Lying)   Pulse 100   Temp 36.8 °C (98.2 °F) (Temporal)   Resp 16   Ht 160 cm (63\")   Wt 67.3 kg (148 lb 6.4 oz)   LMP  (LMP Unknown)   SpO2 93%   BMI 26.29 kg/m²       "

## 2024-11-25 LAB
CYTO UR: NORMAL
LAB AP CASE REPORT: NORMAL
LAB AP CLINICAL INFORMATION: NORMAL
PATH REPORT.FINAL DX SPEC: NORMAL
PATH REPORT.GROSS SPEC: NORMAL

## 2024-12-23 RX ORDER — CLOPIDOGREL BISULFATE 75 MG/1
75 TABLET ORAL DAILY
Qty: 90 TABLET | Refills: 0 | Status: SHIPPED | OUTPATIENT
Start: 2024-12-23

## 2024-12-27 NOTE — PROGRESS NOTES
No chief complaint on file.          History of Present Illness    Patient presents today for telehealth service.  This service was conducted via visual and audio utilizing [employer] technology integrated into epic EMR.   This provider is located  at [facility] in [place], [place].  Patient is being seen remotely via telehealth at [facility] and stated they are in a secure environment for the session.  The patient's condition being diagnosed/treated as appropriate for telemedicine.  Patient consent : You have chosen to receive care through a telehealth visit.  Do you consent to use a video/audio connection for your medical care today? Yes    Anabella Baum is a 59 year-old female, presents here for follow up from previous visit on 8/21/24 for GERD, diarrhea>6 months, and abnormal weight loss.  Progress Notes by Daniel Rodas APRN (08/21/2024 10:30)   Past medical history is significant for GERD, Pelayo's esophagus without dysplasia (EGD 2020), esophageal stricture status post dilatation, vitamin D deficiency, hypertension, hyperlipidemia.     History of Present Illness  The patient presents via virtual visit for evaluation of acid reflux, diarrhea, and abnormal weight loss.    She experienced a gastrointestinal infection last week, which was accompanied by severe diarrhea. She suspects the development of hemorrhoids due to the presence of blood on the toilet paper, but notes that this condition is improving. She has not been prescribed any antibiotics recently. She is scheduled to see another physician today for symptoms of sore throat and fever, and anticipates a potential antibiotic prescription. She inquires about the use of antidiarrheals during episodes of diarrhea.    She has been on long-term pantoprazole therapy for over 20 years and expresses concern about the potential adverse effects of prolonged use of reflux medication. Her sister has suggested the use of  white vinegar as an alternative treatment, and she seeks advice on this matter.    She reports experiencing a stomach bug last week and is currently experiencing symptoms similar to COVID-19 or influenza. She has not been able to get out yet to get the Tylenol cold and flu.    MEDICATIONS  Current: Pantoprazole    Results Reviewed:  Colonoscopy (11/22/2024 10:19)   - Normal mucosa in the entire examined colon. Biopsied.   - The appendiceal orifice, rectum, sigmoid colon, descending colon, splenic flexure, transverse colon, hepatic flexure, ascending colon, cecum, recto-sigmoid colon and ileocecal valve are normal. - Non-bleeding internal hemorrhoids.   - The examination was otherwise normal.    Upper GI Endoscopy (11/22/2024 10:20)   - Esophageal mucosal changes consistent with short-segment Pelayo's esophagus. Biopsied.   - Normal stomach.   - Normal examined duodenum. Biopsied.   - The examination was otherwise normal    Tissue Pathology Exam (11/22/2024 10:33)   No dysplasia.  No hyperplastic or tubulovillous change.  Normal pathology result.    8/21/24  CMP (normal LFTs, )  IgG4 normal  Vitamin D normal  Mitochondrial normal  CBC (hemoglobin 14.8)    11/23/20 EGD, Dr. Miguel Arora - gastritis, ?EOE due to esophageal mucosal changes, esophageal stenosis s/p dilatation.     2/23/2015 Colonoscopy, Dr. Goff - negative findings. Full or detailed report not available for my review. However, noted 3 years CLS survellence recommended (due 11/23/23)    Known history of osteopenia with DEXA scan on 11/3/2023, recommendation to repeat in 2 years (11/2025). ALK level is noted to be 148 on 7/12/24     Physical Exam     Review of Systems      Assessment and Plan    Diagnoses and all orders for this visit:    1. Pelayo's esophagus without dysplasia (Primary)    2. Elevated alkaline phosphatase level       Assessment & Plan  1. Diarrhea.  She experienced significant diarrhea during a recent stomach bug, which led to  rectal soreness and blood on the toilet paper. She suspects the diarrhea caused a hemorrhoid, but the condition is improving.   Colonoscopy showed nonbleeding internal hemorrhoid, entire colon exam was normal.   She is having respiratory symptoms, if urgent care service evaluate and initiate abx, warned patient that If diarrhea persists, she should inform us for further evaluation, including stool tests. Suggestive for an over-the-counter probiotic supplement.   Mild antidiarrheals can be used if necessary.    2. GERD  3. Short-segment BE, endoscopically seen, not mentioned in pathology biopsies.  Upper endoscopy (EGD) consistent with short segment Pelayo's esophagus, biopsy did not confirm that. Normal stomach, normal duodenum. She is advised to maintain adequate hydration.   The upper endoscopy (EGD) suggested short segment Pelayo's esophagus, but the biopsy did not confirm it. She is advised to continue taking pantoprazole once daily. She should also take calcium and vitamin D supplements while on this medication.   She is recommended to avoid acidic, sour, greasy, and spicy foods. She can try apple cider vinegar, 2 teaspoons in 8 ounces of water, three times a day, taken 30 minutes before meals and after meals, and rinse her mouth after each use. If she does this, she can alternate pantoprazole every other day.   I recommend to repeat EGD is recommended in approximately 3 years for safety, given the short-segment BE is noted, endoscopically (2027).  While on long-term PPI to control acid reflux symptoms, she is advised to ensure her kidney function, vitamin D level, and DEXA scan are up to date with her family doctor. She should also have her vitamin B12 and iron levels checked.    Follow-up  The patient will follow up in 3 to 4 months or sooner as needed.    PROCEDURE  Colonoscopy on 11/22/2024 showed nonbleeding internal hemorrhoid and a normal colon exam. Random biopsies were normal with no pathology. Upper  endoscopy (EGD) was consistent with short segment Pelayo's esophagus, but the biopsy did not confirm that. The stomach and duodenum were normal.       I have reviewed patient's current medications list, relevant clinical information necessary for today's encounter. I discussed the clinical impression and treatment plan with the patient, who verbalized understanding and in agreement.  All questions were answered and support was provided.     There are no Patient Instructions on file for this visit.       EMR Dragon/Transcription Disclaimer:  This document has been Dictated utilizing Dragon dictation.     Patient or patient representative verbalized consent for the use of Ambient Listening during the visit with  SUSANNA Storey for chart documentation. 12/30/2024  11:15 EST

## 2024-12-30 ENCOUNTER — TELEMEDICINE (OUTPATIENT)
Dept: GASTROENTEROLOGY | Facility: CLINIC | Age: 59
End: 2024-12-30
Payer: COMMERCIAL

## 2024-12-30 ENCOUNTER — TELEPHONE (OUTPATIENT)
Dept: GASTROENTEROLOGY | Facility: CLINIC | Age: 59
End: 2024-12-30

## 2024-12-30 DIAGNOSIS — K22.70 BARRETT'S ESOPHAGUS WITHOUT DYSPLASIA: Primary | ICD-10-CM

## 2024-12-30 DIAGNOSIS — R74.8 ELEVATED ALKALINE PHOSPHATASE LEVEL: ICD-10-CM

## 2024-12-30 PROCEDURE — 99214 OFFICE O/P EST MOD 30 MIN: CPT | Performed by: NURSE PRACTITIONER

## 2024-12-30 NOTE — TELEPHONE ENCOUNTER
PT IS SICK AND WOULD LIKE TO MAKE TODAYS VISIT A [employer] VIDEO VISIT. PLEASE CALL PT TO ADVISE.   [phone]

## 2025-01-17 ENCOUNTER — OFFICE VISIT (OUTPATIENT)
Dept: NEUROLOGY | Age: 60
End: 2025-01-17
Payer: MEDICARE

## 2025-01-17 VITALS
WEIGHT: 150 LBS | DIASTOLIC BLOOD PRESSURE: 87 MMHG | BODY MASS INDEX: 28.32 KG/M2 | HEIGHT: 61 IN | HEART RATE: 87 BPM | SYSTOLIC BLOOD PRESSURE: 159 MMHG

## 2025-01-17 DIAGNOSIS — I72.0 CAROTID PSEUDOANEURYSM: Primary | ICD-10-CM

## 2025-01-17 PROCEDURE — 99215 OFFICE O/P EST HI 40 MIN: CPT | Performed by: STUDENT IN AN ORGANIZED HEALTH CARE EDUCATION/TRAINING PROGRAM

## 2025-01-17 RX ORDER — ACYCLOVIR 400 MG/1
1 TABLET ORAL
COMMUNITY
Start: 2024-11-22

## 2025-01-17 RX ORDER — ACYCLOVIR 400 MG/1
1 TABLET ORAL AS NEEDED
COMMUNITY
Start: 2024-12-23

## 2025-01-17 NOTE — PROGRESS NOTES
Endovascular Neurosurgery Clinic Note      Reason for evaluation: Right ICA occlusion, L ICA proximal pseudoaneurysm    SUBJECTIVE:     The patient is doing well and is satisfied with her procedure. She is hesitant to stop her Plavix, despite my detailed explanation that it is typically continued for about 3 months. We will obtain a  CTA H&N in a month. She plans to discuss stopping the Plavix during her follow-up in March, as she feels more comfortable continuing it for now.    History of Chief Complaint:      The patient is a 59 y.o. female HTN, smoker, TIAs, who presents as a transfer from [facility] facility for transient left facial droop and left-sided weakness     Patient states that she noticed having shaking of her lower extremity for about 10 minutes and then noticed having left leg weakness for about an hour until subsided on its own, she states that she had TIAs in the past with left-sided weakness, should be taken aspirin but states that she has not been taking for the past week or so, she was seen at [facility] [place] with an initial blood pressure of 200s, was given a dose of labetalol to draw blood pressure, stroke team were consulted.  CT head was unremarkable for acute abnormalities.  However did show hypodensity at the right frontal right parietal lobes, remote lacunar infarct at the right caudate head.  CTA head and neck per report was showing a right ICA stenosis with reconstitution from ACOM.  Decision was to start patient on IV heparin and he will receive a loading dose of aspirin 324 mg p.o. once prior to arrival.  Blood pressure over here was 180s.  Patient was noted to ambulate, NIH of 2 due to right inferior quadrantanopsia and slurred speech.  Did have some weakness at the lower extremity noted on exam.      Allergies  is allergic to amoxicillin, lisinopril, and ziprasidone hcl.  Medications  Prior to Admission medications    Medication Sig Start Date End Date Taking? Authorizing Provider

## 2025-01-24 ENCOUNTER — TELEPHONE (OUTPATIENT)
Dept: NEUROLOGY | Age: 60
End: 2025-01-24

## 2025-01-24 DIAGNOSIS — I72.0 CAROTID PSEUDOANEURYSM (HCC): Primary | ICD-10-CM

## 2025-01-24 NOTE — TELEPHONE ENCOUNTER
[facility] central scheduling([phone]) called and left a vm saying pt needs blood work GFR and creatinine to be order. Please advise

## 2025-02-24 DIAGNOSIS — I10 BENIGN ESSENTIAL HTN: ICD-10-CM

## 2025-02-24 RX ORDER — LISINOPRIL 10 MG/1
10 TABLET ORAL DAILY
Qty: 90 TABLET | Refills: 3 | OUTPATIENT
Start: 2025-02-24

## 2025-02-28 ENCOUNTER — TELEPHONE (OUTPATIENT)
Dept: FAMILY MEDICINE CLINIC | Facility: CLINIC | Age: 60
End: 2025-02-28
Payer: COMMERCIAL

## 2025-02-28 DIAGNOSIS — I10 BENIGN ESSENTIAL HTN: ICD-10-CM

## 2025-03-03 ENCOUNTER — OFFICE VISIT (OUTPATIENT)
Dept: NEUROLOGY | Age: 60
End: 2025-03-03
Payer: MEDICARE

## 2025-03-03 VITALS
DIASTOLIC BLOOD PRESSURE: 80 MMHG | BODY MASS INDEX: 28.34 KG/M2 | HEART RATE: 76 BPM | HEIGHT: 61 IN | SYSTOLIC BLOOD PRESSURE: 147 MMHG

## 2025-03-03 DIAGNOSIS — I65.21 ICAO (INTERNAL CAROTID ARTERY OCCLUSION), RIGHT: Primary | ICD-10-CM

## 2025-03-03 PROCEDURE — 99215 OFFICE O/P EST HI 40 MIN: CPT | Performed by: STUDENT IN AN ORGANIZED HEALTH CARE EDUCATION/TRAINING PROGRAM

## 2025-03-03 NOTE — PROGRESS NOTES
Endovascular Neurosurgery Clinic Note      Reason for evaluation: Right ICA occlusion, L ICA proximal pseudoaneurysm    SUBJECTIVE:     The patient is doing well and is satisfied with her procedure.  Follow-up CTA at [facility] is unremarkable for acute pathology and the stent is patent.  Will discontinue Plavix.  Educated on smoking cessation.  Follow-up duplex ultrasound in 1 year followed by follow-up with Dr. Delacruz    History of Chief Complaint:      The patient is a 59 y.o. female HTN, smoker, TIAs, [facility] and left-sided weakness     Patient states that she noticed having shaking of her lower extremity for about 10 minutes and then noticed having left leg weakness for about an hour until subsided on its own, she states that she had TIAs in the past with left-sided weakness, should be taken aspirin but states that she has not been taking for the past week or so, she was seen at [facility] with an initial blood pressure of 200s, was given a dose of labetalol to draw blood pressure, stroke team were consulted.  CT head was unremarkable for acute abnormalities.  However did show hypodensity at the right frontal right parietal lobes, remote lacunar infarct at the right caudate head.  CTA head and neck per report was showing a right ICA stenosis with reconstitution from ACOM.  Decision was to start patient on IV heparin and he will receive a loading dose of aspirin 324 mg p.o. once prior to arrival.  Blood pressure over here was 180s.  Patient was noted to ambulate, NIH of 2 due to right inferior quadrantanopsia and slurred speech.  Did have some weakness at the lower extremity noted on exam.      Allergies  is allergic to amoxicillin, lisinopril, and ziprasidone hcl.  Medications  Prior to Admission medications    Medication Sig Start Date End Date Taking? Authorizing Provider   Continuous Glucose  (DEXCOM G7 ) [place]

## 2025-03-27 ENCOUNTER — OFFICE VISIT (OUTPATIENT)
Dept: FAMILY MEDICINE CLINIC | Facility: CLINIC | Age: 60
End: 2025-03-27
Payer: COMMERCIAL

## 2025-03-27 VITALS
HEIGHT: 63 IN | OXYGEN SATURATION: 95 % | WEIGHT: 149 LBS | TEMPERATURE: 97.6 F | DIASTOLIC BLOOD PRESSURE: 72 MMHG | HEART RATE: 96 BPM | BODY MASS INDEX: 26.4 KG/M2 | RESPIRATION RATE: 16 BRPM | SYSTOLIC BLOOD PRESSURE: 122 MMHG

## 2025-03-27 DIAGNOSIS — E55.9 VITAMIN D DEFICIENCY: ICD-10-CM

## 2025-03-27 DIAGNOSIS — K21.9 GASTROESOPHAGEAL REFLUX DISEASE WITHOUT ESOPHAGITIS: ICD-10-CM

## 2025-03-27 DIAGNOSIS — E78.2 MIXED HYPERLIPIDEMIA: ICD-10-CM

## 2025-03-27 DIAGNOSIS — E53.8 LOW SERUM VITAMIN B12: ICD-10-CM

## 2025-03-27 DIAGNOSIS — Z13.6 SCREENING FOR ISCHEMIC HEART DISEASE: ICD-10-CM

## 2025-03-27 DIAGNOSIS — I65.23 BILATERAL CAROTID ARTERY STENOSIS: ICD-10-CM

## 2025-03-27 DIAGNOSIS — Z72.0 TOBACCO ABUSE: Primary | ICD-10-CM

## 2025-03-27 DIAGNOSIS — I10 BENIGN ESSENTIAL HTN: ICD-10-CM

## 2025-03-27 DIAGNOSIS — R73.01 IMPAIRED FASTING GLUCOSE: ICD-10-CM

## 2025-03-27 RX ORDER — MAGNESIUM 200 MG
TABLET ORAL
Qty: 90 TABLET | Refills: 3 | Status: SHIPPED | OUTPATIENT
Start: 2025-03-27

## 2025-03-27 RX ORDER — CHOLECALCIFEROL (VITAMIN D3) 50 MCG
TABLET ORAL
Qty: 90 TABLET | Refills: 1 | Status: SHIPPED | OUTPATIENT
Start: 2025-03-27

## 2025-03-27 RX ORDER — ATORVASTATIN CALCIUM 80 MG/1
80 TABLET, FILM COATED ORAL NIGHTLY
Qty: 90 TABLET | Refills: 3 | Status: SHIPPED | OUTPATIENT
Start: 2025-03-27

## 2025-03-27 RX ORDER — LANSOPRAZOLE 30 MG/1
30 CAPSULE, DELAYED RELEASE ORAL DAILY
Qty: 90 CAPSULE | Refills: 3 | Status: SHIPPED | OUTPATIENT
Start: 2025-03-27

## 2025-03-27 RX ORDER — LISINOPRIL 10 MG/1
10 TABLET ORAL DAILY
Qty: 90 TABLET | Refills: 1 | Status: SHIPPED | OUTPATIENT
Start: 2025-03-27

## 2025-03-27 NOTE — PROGRESS NOTES
"Subjective   Anabella Baum is a 60 y.o. female.     History of Present Illness    Since the last visit, she has overall felt well.  She has Primary Hypertension and well controlled on current medication, Impaired fasting glucose and will monitor labs to watch for DMII, GERD controlled on PPI Rx, Hyperlipidemia with goals met with current Rx, and Vitamin D deficiency and will update labs for continued management.  she has been compliant with current medications have reviewed them.  The patient denies medication side effects.  Will refill medications. /72   Pulse 96   Temp 97.6 °F (36.4 °C) (Temporal)   Resp 16   Ht 160 cm (63\")   Wt 67.6 kg (149 lb)   LMP  (LMP Unknown)   SpO2 95%   BMI 26.39 kg/m² .  BMI is >= 25 and <30. (Overweight) The following options were offered after discussion;: exercise counseling/recommendations  Sees dermatology Dr. Truong for skin check yearly  Due for carotid Doppler she had mild plaque on 6/22/2023 bilateral  Results for orders placed or performed in visit on 02/01/25   Comprehensive Metabolic Panel    Collection Time: 02/28/25 10:12 AM    Specimen: Blood   Result Value Ref Range    Glucose 106 (H) 70 - 99 mg/dL    BUN 14 8 - 27 mg/dL    Creatinine 0.64 0.57 - 1.00 mg/dL    EGFR Result 100 >59 mL/min/1.73    BUN/Creatinine Ratio 22 12 - 28    Sodium 141 134 - 144 mmol/L    Potassium 4.4 3.5 - 5.2 mmol/L    Chloride 102 96 - 106 mmol/L    Total CO2 23 20 - 29 mmol/L    Calcium 9.7 8.7 - 10.3 mg/dL    Total Protein 7.0 6.0 - 8.5 g/dL    Albumin 4.7 3.9 - 4.9 g/dL    Globulin 2.3 1.5 - 4.5 g/dL    Total Bilirubin 0.6 0.0 - 1.2 mg/dL    Alkaline Phosphatase 137 (H) 44 - 121 IU/L    AST (SGOT) 22 0 - 40 IU/L    ALT (SGPT) 26 0 - 32 IU/L   Hemoglobin A1c    Collection Time: 02/28/25 10:12 AM    Specimen: Blood   Result Value Ref Range    Hemoglobin A1C 6.2 (H) 4.8 - 5.6 %   Hemoglobin A1c improved it was 6.4 now down to 6.2%  She did have a low-dose CT of the chest to " screen for lung cancer on 9/13/2024 also had her mammogram screening that was negative chest CT----she did have treadmill stress test on 1/7/2021 and negative for ischemia  Low-dose    FINDINGS:  Emphysema. Stable biapical pleural-parenchymal scarring. There are a few  scattered micronodules in the lungs that are stable. There is no  lymphadenopathy or pleural effusion. Coronary artery calcifications are  present. Limited imaging of the upper abdomen demonstrates a  cholecystectomy. No acute bony abnormality     IMPRESSION:  Stable low-dose chest CT. ACR Lung-Rads category 2. Follow-up low-dose  chest CT in 12 months     Saw GYN well woman 10-2-24---had mammo  Saw GI for LFTs and f/u GERD--hx Pelayo's    Also saw GI for LFTs=== had additional labs and were normal  Results Reviewed:  Colonoscopy (11/22/2024 10:19)   - Normal mucosa in the entire examined colon. Biopsied.   - The appendiceal orifice, rectum, sigmoid colon, descending colon, splenic flexure, transverse colon, hepatic flexure, ascending colon, cecum, recto-sigmoid colon and ileocecal valve are normal. - Non-bleeding internal hemorrhoids.   - The examination was otherwise normal.     Upper GI Endoscopy (11/22/2024 10:20)   - Esophageal mucosal changes consistent with short-segment Pelayo's esophagus. Biopsied.   - Normal stomach.   - Normal examined duodenum. Biopsied.   - The examination was otherwise normal     Tissue Pathology Exam (11/22/2024 10:33)   No dysplasia.  No hyperplastic or tubulovillous change.  Normal pathology result.    To have EGD Q 3 yrs  The following portions of the patient's history were reviewed and updated as appropriate: allergies, current medications, past family history, past medical history, past social history, past surgical history, and problem list.    Review of Systems    Objective   Physical Exam      Assessment & Plan   Diagnoses and all orders for this visit:    1. Benign essential HTN (Primary)  -     lisinopril  (PRINIVIL,ZESTRIL) 10 MG tablet; Take 1 tablet by mouth Daily. for blood pressure  Dispense: 90 tablet; Refill: 1    2. Essential hypertension    3. Tobacco abuse    4. Impaired fasting glucose    5. Bilateral carotid artery stenosis    6. Gastroesophageal reflux disease without esophagitis    7. Vitamin D deficiency  -     Cholecalciferol (Vitamin D) 50 MCG (2000 UT) tablet; Take one tablet by mouth once daily.  Dispense: 90 tablet; Refill: 1    8. Mixed hyperlipidemia    9. Low serum vitamin B12    10. Screening for ischemic heart disease  -     CT Cardiac Calcium Score Without Dye; Future    11. Benign essential HTN  Comments:  Well-controlled, asymptomatic  Continue Lisinopril daily  DASH diet, exercise, weight loss  Follow-up with PCP in 6 months  Orders:  -     lisinopril (PRINIVIL,ZESTRIL) 10 MG tablet; Take 1 tablet by mouth Daily. for blood pressure  Dispense: 90 tablet; Refill: 1    12. Vitamin D deficiency  Comments:  Continue vitamin D daily  Follow-up with PCP in 6 months  Orders:  -     Cholecalciferol (Vitamin D) 50 MCG (2000 UT) tablet; Take one tablet by mouth once daily.  Dispense: 90 tablet; Refill: 1    Other orders  -     lansoprazole (PREVACID) 30 MG capsule; Take 1 capsule by mouth Daily. For GERD with Pelayo's esophagus  Dispense: 90 capsule; Refill: 3  -     Cyanocobalamin (Vitamin B-12) 1000 MCG sublingual tablet; DISSOLVE 1 TABLET UNDER THE TONGUE DAILY  Dispense: 90 tablet; Refill: 3  -     atorvastatin (LIPITOR) 80 MG tablet; Take 1 tablet by mouth Every Night. For cholesterol  Dispense: 90 tablet; Refill: 3      She is due for a carotid Doppler she had mild plaque bilateral on 6/22/2023  Will need EGD every 3 years due to Pelayo's esophagus and continue PPI  Stop smoking  Low-dose CT chest is ordered again for November and done yearly for screening and mammogram as well  Will get calcium cardiac CT scan to further evaluate her coronary artery calcification  Plan, Anabella Baum, was  seen today.  she was seen for HTN and continue medication, Imparied fasting glucose and plan follow up labs, diet, and exercise, GERD and will continue on PPI medication, Hyperlipidemia and will continue current medication, and Vitamin D deficiency and will update labs .  Advised Prevnar 20, Shingrix  Still taking B12 and vitamin D  Last LDL cholesterol was 70 and goal met  Sees dermatology Dr. Truong for yearly skin check

## 2025-04-22 ENCOUNTER — RESULTS FOLLOW-UP (OUTPATIENT)
Dept: FAMILY MEDICINE CLINIC | Facility: CLINIC | Age: 60
End: 2025-04-22
Payer: COMMERCIAL

## 2025-04-22 DIAGNOSIS — I25.10 CORONARY ARTERY CALCIFICATION OF NATIVE ARTERY: Primary | ICD-10-CM

## 2025-04-22 DIAGNOSIS — I25.84 CORONARY ARTERY CALCIFICATION OF NATIVE ARTERY: Primary | ICD-10-CM

## 2025-04-22 DIAGNOSIS — I65.23 BILATERAL CAROTID ARTERY STENOSIS: ICD-10-CM

## 2025-04-23 ENCOUNTER — HOSPITAL ENCOUNTER (OUTPATIENT)
Dept: CARDIOLOGY | Facility: HOSPITAL | Age: 60
Discharge: HOME | End: 2025-04-23
Admitting: PHYSICIAN ASSISTANT
Payer: COMMERCIAL

## 2025-04-23 LAB
BH CV XLRA MEAS LEFT DIST CCA EDV: -22.5 CM/SEC
BH CV XLRA MEAS LEFT DIST CCA PSV: -77.4 CM/SEC
BH CV XLRA MEAS LEFT DIST ICA EDV: -37.9 CM/SEC
BH CV XLRA MEAS LEFT DIST ICA PSV: -90.5 CM/SEC
BH CV XLRA MEAS LEFT ICA/CCA RATIO: 1.17
BH CV XLRA MEAS LEFT MID ICA EDV: -34.6 CM/SEC
BH CV XLRA MEAS LEFT MID ICA PSV: -89.5 CM/SEC
BH CV XLRA MEAS LEFT PROX CCA EDV: 28.1 CM/SEC
BH CV XLRA MEAS LEFT PROX CCA PSV: 124 CM/SEC
BH CV XLRA MEAS LEFT PROX ECA EDV: -13.7 CM/SEC
BH CV XLRA MEAS LEFT PROX ECA PSV: -104.3 CM/SEC
BH CV XLRA MEAS LEFT PROX ICA EDV: -26.9 CM/SEC
BH CV XLRA MEAS LEFT PROX ICA PSV: -68 CM/SEC
BH CV XLRA MEAS LEFT PROX SCLA PSV: 222.6 CM/SEC
BH CV XLRA MEAS LEFT VERTEBRAL A EDV: -8.1 CM/SEC
BH CV XLRA MEAS LEFT VERTEBRAL A PSV: -36.8 CM/SEC
BH CV XLRA MEAS RIGHT DIST CCA EDV: -24.2 CM/SEC
BH CV XLRA MEAS RIGHT DIST CCA PSV: -77 CM/SEC
BH CV XLRA MEAS RIGHT DIST ICA EDV: -36.7 CM/SEC
BH CV XLRA MEAS RIGHT DIST ICA PSV: -93.8 CM/SEC
BH CV XLRA MEAS RIGHT ICA/CCA RATIO: 1.22
BH CV XLRA MEAS RIGHT MID ICA EDV: -27.3 CM/SEC
BH CV XLRA MEAS RIGHT MID ICA PSV: -85.1 CM/SEC
BH CV XLRA MEAS RIGHT PROX CCA EDV: 20.5 CM/SEC
BH CV XLRA MEAS RIGHT PROX CCA PSV: 96.9 CM/SEC
BH CV XLRA MEAS RIGHT PROX ECA EDV: -19.3 CM/SEC
BH CV XLRA MEAS RIGHT PROX ECA PSV: -113.1 CM/SEC
BH CV XLRA MEAS RIGHT PROX ICA EDV: -21.7 CM/SEC
BH CV XLRA MEAS RIGHT PROX ICA PSV: -72.1 CM/SEC
BH CV XLRA MEAS RIGHT PROX SCLA PSV: 146.2 CM/SEC
BH CV XLRA MEAS RIGHT VERTEBRAL A EDV: -17.1 CM/SEC
BH CV XLRA MEAS RIGHT VERTEBRAL A PSV: -47.5 CM/SEC
LEFT ARM BP: NORMAL MMHG
RIGHT ARM BP: NORMAL MMHG

## 2025-04-23 PROCEDURE — 93880 EXTRACRANIAL BILAT STUDY: CPT

## 2025-04-30 ENCOUNTER — OFFICE VISIT (OUTPATIENT)
Dept: CARDIOLOGY | Facility: CLINIC | Age: 60
End: 2025-04-30
Payer: COMMERCIAL

## 2025-04-30 VITALS
HEART RATE: 103 BPM | SYSTOLIC BLOOD PRESSURE: 140 MMHG | DIASTOLIC BLOOD PRESSURE: 88 MMHG | HEIGHT: 63 IN | BODY MASS INDEX: 26.05 KG/M2 | WEIGHT: 147 LBS

## 2025-04-30 DIAGNOSIS — Z72.0 TOBACCO ABUSE: ICD-10-CM

## 2025-04-30 DIAGNOSIS — R53.83 EASY FATIGABILITY: Primary | ICD-10-CM

## 2025-04-30 DIAGNOSIS — R29.898 WEAKNESS OF BOTH LEGS: ICD-10-CM

## 2025-04-30 DIAGNOSIS — E78.2 MIXED HYPERLIPIDEMIA: ICD-10-CM

## 2025-04-30 DIAGNOSIS — I10 BENIGN ESSENTIAL HTN: ICD-10-CM

## 2025-04-30 PROCEDURE — 99204 OFFICE O/P NEW MOD 45 MIN: CPT | Performed by: STUDENT IN AN ORGANIZED HEALTH CARE EDUCATION/TRAINING PROGRAM

## 2025-04-30 PROCEDURE — 93000 ELECTROCARDIOGRAM COMPLETE: CPT | Performed by: STUDENT IN AN ORGANIZED HEALTH CARE EDUCATION/TRAINING PROGRAM

## 2025-04-30 NOTE — PROGRESS NOTES
Subjective:     Encounter Date:04/30/2025      Patient ID: Anabella Baum is a 60 y.o. female.    Chief Complaint:  Abnormal CT calcium score    HPI:   60 y.o. female with current smoker, hypertension, GERD, hyperlipidemia who presents for initial evaluation of CT calcium score of 1509.  Patient had CT calcium score on April 22 and this revealed a total calcium score of 1508, left main 149, , left circumflex 381, .  Patient notes that she has been experiencing easy fatigue, back pain with activity as well as feeling like her legs get very weak with activity.  Her mother had CAD with stents around her age and she grew concerned.  She denies any chest pain but reports mild dyspnea on exertion.    Mother- CAD with stents    Smoking: ppd > 40 years      The following portions of the patient's history were reviewed and updated as appropriate: allergies, current medications, past family history, past medical history, past social history, past surgical history and problem list.     REVIEW OF SYSTEMS:   All systems reviewed.  Pertinent positives identified in HPI.  All other systems are negative.    Past Medical History:   Diagnosis Date    Allergic     Seasonal    Allergic reaction     Arthritis     Spinal    Pelayo esophagus     Benign essential hypertension     Bruit of left carotid artery     Bursitis of right hip     Carotid bruit     Cholelithiasis     Removed    CTS (carpal tunnel syndrome)     left    Emphysema of lung 2024    GERD (gastroesophageal reflux disease)     Herpes     History of CT scan of abdomen 11/20/2011    and pelvis without contrast; left 5mm renal stone with hydronephrosis    History of Doppler ultrasound 09/27/2013    carotid; right is neg; left internal carotid with 1-15% plaque and in left bifurcation; has patent blood flow    History of transfusion     HLD (hyperlipidemia)     Kidney calculus     Lactose intolerance     Quadruplet gestation     Rash, skin     SAB (spontaneous  )     Sprain, strain     Vitamin D deficiency        Family History   Problem Relation Age of Onset    Heart disease Mother     Hypertension Mother     Lung disease Mother     COPD Mother             Diabetes Mother             Hyperlipidemia Mother     Kidney disease Mother             Vision loss Mother     Stroke Mother     Heart attack Mother     Diabetes Father     Anxiety disorder Sister     Hypertension Sister     Hyperlipidemia Sister     Breast cancer Sister 47        genetic testing neg     Cancer Sister     Arthritis Sister     Hyperlipidemia Sister     Depression Sister     Hyperlipidemia Sister     Diabetes Sister     Breast cancer Maternal Aunt 65        genetic testing negative     Thyroid disease Maternal Aunt         Hashimoto    Thyroid disease Maternal Aunt         Hashimoto    Irritable bowel syndrome Paternal Aunt     Heart disease Maternal Grandfather     Colon cancer Cousin 40        maternal     Colon polyps Neg Hx     Crohn's disease Neg Hx     Ulcerative colitis Neg Hx        Social History     Socioeconomic History    Marital status:    Tobacco Use    Smoking status: Every Day     Current packs/day: 1.00     Average packs/day: 1 pack/day for 35.0 years (35.0 ttl pk-yrs)     Types: Cigarettes     Passive exposure: Never    Smokeless tobacco: Never    Tobacco comments:     rash with e cig  Caffeine use 1-2 sodas daily. Smokes 1ppd   Vaping Use    Vaping status: Never Used   Substance and Sexual Activity    Alcohol use: Yes     Comment: occ    Drug use: No    Sexual activity: Yes     Partners: Male     Birth control/protection: Vasectomy       Allergies   Allergen Reactions    Amoxicillin-Pot Clavulanate GI Intolerance and Unknown - High Severity     No rash; can take cephalosporin    Levofloxacin Other (See Comments) and Unknown - High Severity     Joint pain    Ibuprofen Rash and Unknown - High Severity       Past Surgical History:   Procedure  Laterality Date     SECTION       SECTION      CHOLECYSTECTOMY      COLONOSCOPY  2018    COLONOSCOPY N/A 2024    Procedure: COLONOSCOPY TO CECUM;  Surgeon: Luis Rea MD;  Location: [facility] MAIN OR;  Service: Gastroenterology;  Laterality: N/A;  HEMORRHOIDS    ENDOSCOPY  2015    Dr. Goff; antral gastritis    ENDOSCOPY N/A 2020    Procedure: ESOPHAGOGASTRODUODENOSCOPY WITH COLD BIOPSIES, ARIAS DILATION 46FR;  Surgeon: Maite Rivera MD;  Location: [facility] ENDOSCOPY;  Service: Gastroenterology;  Laterality: N/A;  PRE- DYSPHAGIA, GERD  POST- IRREG Z LINE, GASTRITIS, R/O EOE    ENDOSCOPY N/A 2024    Procedure: ESOPHAGOGASTRODUODENOSCOPY;  Surgeon: Luis Rea MD;  Location: [facility] MAIN OR;  Service: Gastroenterology;  Laterality: N/A;  EGD: BARRETTS ESOPHAGUS///    LITHOTRIPSY  2011    L ESWL    UPPER GASTROINTESTINAL ENDOSCOPY           ECG 12 Lead    Date/Time: 2025 11:43 AM  Performed by: Burak Gunderson MD    Authorized by: Burak Gunderson MD  Comparison: compared with previous ECG from 11/15/2022  Similar to previous ECG  Rhythm: sinus tachycardia  ST Depression: II, III, aVF, V4, V6 and V5  Other findings: non-specific ST-T wave changes    Clinical impression: abnormal EKG             Objective:         Vitals:    25 1030   BP: 140/88   Pulse: 103       PHYSICAL EXAM:  GEN: well appearing, in NAD   HEENT: NCAT, EOMI, moist mucus membranes   Respiratory: CTAB, no wheezes, rales or rhonchi  CV: normal rate, regular rhythm, normal S1, S2, no murmurs, rubs, gallops, +2 radial pulses b/l  GI: soft, nontender, nondistended  MSK: no edema  Skin: no rash, warm, dry  Heme/Lymph: no bruising or bleeding  Neuro: Alert and Oriented x 3, grossly normal motor function        Assessment:         (R53.83) Easy fatigability - Plan: Stress Test With Myocardial Perfusion One Day    (R29.898) Weakness of both legs - Plan: Stress Test With Myocardial Perfusion  One Day    (I10) Benign essential HTN    (E78.2) Mixed hyperlipidemia    (Z72.0) Tobacco abuse    60 y.o. female with current smoker, hypertension, GERD, hyperlipidemia who presents for initial evaluation of CT calcium score of 1509 in the setting of easy fatigability.       Plan:       #Easy fatigue/Abnormal CT Ca score  Patient with easy fatigue and mild dyspnea on exertion.  She has respecters CAD including family history, hyperlipidemia, hypertension, current smoking and severely elevated calcium score of 1509.  She had an equivocal stress test a couple of years ago.  Will obtain exercise nuclear stress test for further evaluation.    DIEGO Luevano, thank you very much for referring this kind patient to me. Please call me with any questions or concerns. I will see the patient again in the office pending test results         Burak Patel MD, [facility], [facility]  04/30/25  [facility]    Outpatient Encounter Medications as of 4/30/2025   Medication Sig Dispense Refill    aspirin 81 MG tablet Take 1 tablet by mouth Daily.      atorvastatin (LIPITOR) 80 MG tablet Take 1 tablet by mouth Every Night. For cholesterol 90 tablet 3    cetirizine (zyrTEC) 5 MG tablet Take 1 tablet by mouth Daily.      Cholecalciferol (Vitamin D) 50 MCG (2000 UT) tablet Take one tablet by mouth once daily. 90 tablet 1    Cyanocobalamin (Vitamin B-12) 1000 MCG sublingual tablet DISSOLVE 1 TABLET UNDER THE TONGUE DAILY 90 tablet 3    lansoprazole (PREVACID) 30 MG capsule Take 1 capsule by mouth Daily. For GERD with Pelayo's esophagus 90 capsule 3    lisinopril (PRINIVIL,ZESTRIL) 10 MG tablet Take 1 tablet by mouth Daily. for blood pressure 90 tablet 1    Multiple Vitamins-Minerals (MULTIVITAMIN WITH MINERALS) tablet Take 1 tablet by mouth Daily.      Probiotic Product (PROBIOTIC BLEND PO) Take 1 Units by mouth Daily. Brand FABIÁN       No facility-administered encounter medications on file as of 4/30/2025.

## 2025-05-06 ENCOUNTER — TELEPHONE (OUTPATIENT)
Dept: CARDIOLOGY | Age: 60
End: 2025-05-06
Payer: COMMERCIAL

## 2025-05-07 ENCOUNTER — HOSPITAL ENCOUNTER (OUTPATIENT)
Dept: CARDIOLOGY | Facility: HOSPITAL | Age: 60
Discharge: HOME | End: 2025-05-07
Admitting: STUDENT IN AN ORGANIZED HEALTH CARE EDUCATION/TRAINING PROGRAM
Payer: COMMERCIAL

## 2025-05-07 VITALS — BODY MASS INDEX: 26.05 KG/M2 | WEIGHT: 147.05 LBS | HEIGHT: 63 IN

## 2025-05-07 DIAGNOSIS — R29.898 WEAKNESS OF BOTH LEGS: ICD-10-CM

## 2025-05-07 DIAGNOSIS — R53.83 EASY FATIGABILITY: ICD-10-CM

## 2025-05-07 LAB
BH CV NUCLEAR PRIOR STUDY: 2
BH CV REST NUCLEAR ISOTOPE DOSE: 10.3 MCI
BH CV STRESS DURATION MIN STAGE 1: 1
BH CV STRESS DURATION SEC STAGE 1: 42
BH CV STRESS GRADE STAGE 1: 10
BH CV STRESS HR STAGE 1: 121
BH CV STRESS METS STAGE 1: 2.5
BH CV STRESS NUCLEAR ISOTOPE DOSE: 33.4 MCI
BH CV STRESS PROTOCOL 1: NORMAL
BH CV STRESS PROTOCOL 2 BP STAGE 1: NORMAL
BH CV STRESS PROTOCOL 2 COMMENTS STAGE 1: NORMAL
BH CV STRESS PROTOCOL 2 DOSE REGADENOSON STAGE 1: 0.4
BH CV STRESS PROTOCOL 2 DURATION MIN STAGE 1: 0
BH CV STRESS PROTOCOL 2 DURATION SEC STAGE 1: 10
BH CV STRESS PROTOCOL 2 HR STAGE 1: 136
BH CV STRESS PROTOCOL 2 STAGE 1: 1
BH CV STRESS PROTOCOL 2: NORMAL
BH CV STRESS RECOVERY BP: NORMAL MMHG
BH CV STRESS RECOVERY HR: 103 BPM
BH CV STRESS SPEED STAGE 1: 1.7
BH CV STRESS STAGE 1: 1
MAXIMAL PREDICTED HEART RATE: 159 BPM
PERCENT MAX PREDICTED HR: 85.53 %
SPECT HRT GATED+EF W RNC IV: 63 %
STRESS BASELINE BP: NORMAL MMHG
STRESS BASELINE HR: 93 BPM
STRESS PERCENT HR: 101 %
STRESS POST EXERCISE DUR MIN: 0 MIN
STRESS POST EXERCISE DUR SEC: 10 SEC
STRESS POST PEAK BP: NORMAL MMHG
STRESS POST PEAK HR: 136 BPM
STRESS TARGET HR: 135 BPM

## 2025-05-07 PROCEDURE — 93017 CV STRESS TEST TRACING ONLY: CPT

## 2025-05-07 PROCEDURE — 34310000005 TECHNETIUM TETROFOSMIN KIT: Performed by: STUDENT IN AN ORGANIZED HEALTH CARE EDUCATION/TRAINING PROGRAM

## 2025-05-07 PROCEDURE — 25010000002 REGADENOSON 0.4 MG/5ML SOLUTION: Performed by: STUDENT IN AN ORGANIZED HEALTH CARE EDUCATION/TRAINING PROGRAM

## 2025-05-07 PROCEDURE — A9502 TC99M TETROFOSMIN: HCPCS | Performed by: STUDENT IN AN ORGANIZED HEALTH CARE EDUCATION/TRAINING PROGRAM

## 2025-05-07 PROCEDURE — 78452 HT MUSCLE IMAGE SPECT MULT: CPT

## 2025-05-07 RX ORDER — REGADENOSON 0.08 MG/ML
0.4 INJECTION, SOLUTION INTRAVENOUS
Status: COMPLETED | OUTPATIENT
Start: 2025-05-07 | End: 2025-05-07

## 2025-05-07 RX ADMIN — TETROFOSMIN 1 DOSE: 1.38 INJECTION, POWDER, LYOPHILIZED, FOR SOLUTION INTRAVENOUS at 09:48

## 2025-05-07 RX ADMIN — TETROFOSMIN 1 DOSE: 1.38 INJECTION, POWDER, LYOPHILIZED, FOR SOLUTION INTRAVENOUS at 09:03

## 2025-05-07 RX ADMIN — REGADENOSON 0.4 MG: 0.08 INJECTION, SOLUTION INTRAVENOUS at 09:47

## 2025-05-09 ENCOUNTER — TELEPHONE (OUTPATIENT)
Dept: CARDIOLOGY | Age: 60
End: 2025-05-09
Payer: COMMERCIAL

## 2025-05-09 NOTE — TELEPHONE ENCOUNTER
I will let her know. Did you want her to FU with you? She currently doesn't have anything scheduled and I didn't see anything in your office note about FU.     Lizzeth Martinez RN  Triage [facility]

## 2025-05-09 NOTE — TELEPHONE ENCOUNTER
Notified patient of results/recommendations. Patient verbalized understanding. FU scheduled.     Lizzeth Martinez RN  Triage [facility]

## 2025-05-09 NOTE — TELEPHONE ENCOUNTER
Patient is requesting the results of her stress test. Let me know if there is anything I can relay to the patient.     Lizzeth Martinez RN  Triage [facility]

## 2025-06-24 ENCOUNTER — OFFICE VISIT (OUTPATIENT)
Dept: FAMILY MEDICINE CLINIC | Facility: CLINIC | Age: 60
End: 2025-06-24
Payer: COMMERCIAL

## 2025-06-24 VITALS
HEIGHT: 63 IN | TEMPERATURE: 98.5 F | RESPIRATION RATE: 16 BRPM | SYSTOLIC BLOOD PRESSURE: 112 MMHG | OXYGEN SATURATION: 96 % | HEART RATE: 107 BPM | DIASTOLIC BLOOD PRESSURE: 60 MMHG | BODY MASS INDEX: 26.58 KG/M2 | WEIGHT: 150 LBS

## 2025-06-24 DIAGNOSIS — R52 PAIN AGGRAVATED BY EXERCISE: ICD-10-CM

## 2025-06-24 DIAGNOSIS — N23 URINARY TRACT PAIN: Primary | ICD-10-CM

## 2025-06-24 DIAGNOSIS — M79.604 PAIN IN BOTH LOWER EXTREMITIES: ICD-10-CM

## 2025-06-24 DIAGNOSIS — G89.29 CHRONIC MIDLINE LOW BACK PAIN WITH BILATERAL SCIATICA: ICD-10-CM

## 2025-06-24 DIAGNOSIS — M79.602 PAIN OF LEFT UPPER EXTREMITY: ICD-10-CM

## 2025-06-24 DIAGNOSIS — M79.605 PAIN IN BOTH LOWER EXTREMITIES: ICD-10-CM

## 2025-06-24 DIAGNOSIS — R09.89 DECREASED PEDAL PULSES: ICD-10-CM

## 2025-06-24 DIAGNOSIS — M54.41 CHRONIC MIDLINE LOW BACK PAIN WITH BILATERAL SCIATICA: ICD-10-CM

## 2025-06-24 DIAGNOSIS — M25.522 LEFT ELBOW PAIN: ICD-10-CM

## 2025-06-24 DIAGNOSIS — M54.42 CHRONIC MIDLINE LOW BACK PAIN WITH BILATERAL SCIATICA: ICD-10-CM

## 2025-06-24 LAB
BILIRUB BLD-MCNC: NEGATIVE MG/DL
CLARITY, POC: CLEAR
COLOR UR: YELLOW
EXPIRATION DATE: ABNORMAL
GLUCOSE UR STRIP-MCNC: NEGATIVE MG/DL
KETONES UR QL: NEGATIVE
LEUKOCYTE EST, POC: NEGATIVE
Lab: ABNORMAL
NITRITE UR-MCNC: NEGATIVE MG/ML
PH UR: 6 [PH] (ref 5–8)
PROT UR STRIP-MCNC: NEGATIVE MG/DL
RBC # UR STRIP: ABNORMAL /UL
SP GR UR: 1.01 (ref 1–1.03)
UROBILINOGEN UR QL: ABNORMAL

## 2025-06-24 PROCEDURE — 81003 URINALYSIS AUTO W/O SCOPE: CPT | Performed by: PHYSICIAN ASSISTANT

## 2025-06-24 PROCEDURE — 99214 OFFICE O/P EST MOD 30 MIN: CPT | Performed by: PHYSICIAN ASSISTANT

## 2025-06-24 NOTE — PROGRESS NOTES
Subjective   Anabella Baum is a 60 y.o. female.     Extremity Weakness  Associated symptoms: no fever and no numbness    Hip Pain   Pertinent negatives include no numbness.   Back Pain  Associated symptoms: weakness    Associated symptoms: no abdominal pain, no chest pain, no dysuria, no fever and no numbness         Anabella Baum 60 y.o.female complains of urinary symptoms. she complains of discomfort ----with sitting in her urethra. She has had symptoms for 2 days. The symptoms are mild.  Patient denies fever, gross blood in urine, urgency, frequency, dysuria, nausea, vomiting, and abdominal pain.  Patient has tried  nothing for relief of symptoms.  Patient does not have a history of recurrent UTI. Patient does not have a history of pyelonephritis.         Our last visit was 3/27/2025 and I have refilled her meds for hypertension, GERD, mixed hyperlipidemia and noted that she had impaired fasting glucose and I will monitor that with an A1c also note she sees dermatology yearly Dr. Truong and has known carotid artery plaque in both carotids and monitor with Doppler  Also she is a smoker and I do low-dose CT of the chest every year to screen for lung cancer she does see GYN she does see GI history of elevated LFTs and Pelayo's esophagus with GERD  Past hx renal stones    Saw cardio DR Gunderson----4-30-25-----neg stress test    Still having leg weakness---more noticed during walking and has to stop. ---leg discomfort.  ---worse with distance walking.  Has pain in her lumbar spine and SI area during walking and after---mos;   Worse also  She does have mild plaque in both carotid arteries I checked this yearly and is stable  She has burning pain in forearm and elbow---if hit elbow---shoots pain; can hurt with some ROM--lifting     I do want to note that she has had CT abdomen and pelvis in the past with urology in 2020 and did note aortoiliac atherosclerosis  The following portions of the patient's history were  reviewed and updated as appropriate: allergies, current medications, past family history, past medical history, past social history, past surgical history, and problem list.    Review of Systems   Constitutional:  Positive for fatigue. Negative for chills, diaphoresis and fever.   HENT:  Negative for congestion, sore throat and swollen glands.    Respiratory:  Negative for cough.    Cardiovascular:  Negative for chest pain.   Gastrointestinal:  Negative for abdominal pain, nausea and vomiting.   Genitourinary:  Negative for dysuria.   Musculoskeletal:  Positive for back pain and extremity weakness. Negative for neck pain.   Skin:  Negative for rash.   Neurological:  Positive for weakness. Negative for numbness.       Objective   Physical Exam  Vitals and nursing note reviewed.   Constitutional:       General: She is not in acute distress.     Appearance: Normal appearance. She is well-developed. She is not ill-appearing or toxic-appearing.   HENT:      Head: Normocephalic.      Right Ear: External ear normal.      Left Ear: External ear normal.      Nose: Nose normal.      Mouth/Throat:      Pharynx: Oropharynx is clear.   Eyes:      General: No scleral icterus.     Conjunctiva/sclera: Conjunctivae normal.      Pupils: Pupils are equal, round, and reactive to light.   Neck:      Thyroid: No thyromegaly.   Cardiovascular:      Rate and Rhythm: Normal rate and regular rhythm.      Heart sounds: Normal heart sounds. No murmur heard.  Pulmonary:      Effort: Pulmonary effort is normal. No respiratory distress.      Breath sounds: Normal breath sounds.   Musculoskeletal:         General: No tenderness or deformity. Normal range of motion.      Cervical back: Normal range of motion and neck supple.   Skin:     General: Skin is warm and dry.      Findings: No rash.   Neurological:      General: No focal deficit present.      Mental Status: She is alert and oriented to person, place, and time. Mental status is at baseline.       Sensory: No sensory deficit.      Motor: No weakness.      Gait: Gait normal.      Deep Tendon Reflexes: Reflexes normal.   Psychiatric:         Mood and Affect: Mood normal.         Behavior: Behavior normal.         Thought Content: Thought content normal.         Judgment: Judgment normal.           Assessment & Plan   Diagnoses and all orders for this visit:    1. Urinary tract pain (Primary)  -     POCT urinalysis dipstick, automated    I am concerned up for intermittent claudication, peripheral artery disease with her exertional pain and having to stop and rest and then restart walking in a smoker.  Also decreased pedal pulses that are present but decreased  Concerned she has nerve compression in her elbow area on the left arm  I will have her see sports medicine clinic first before further workup after obtaining x-rays lumbar spine  Some discomfort in the urethra with sittin had blood on point-of-care urine need to get urine micro and culture before starting any treatment

## 2025-06-25 ENCOUNTER — RESULTS FOLLOW-UP (OUTPATIENT)
Dept: FAMILY MEDICINE CLINIC | Facility: CLINIC | Age: 60
End: 2025-06-25
Payer: COMMERCIAL

## 2025-06-25 DIAGNOSIS — M54.41 CHRONIC MIDLINE LOW BACK PAIN WITH BILATERAL SCIATICA: Primary | ICD-10-CM

## 2025-06-25 DIAGNOSIS — R29.898 WEAKNESS OF BOTH LOWER EXTREMITIES: ICD-10-CM

## 2025-06-25 DIAGNOSIS — M54.42 CHRONIC MIDLINE LOW BACK PAIN WITH BILATERAL SCIATICA: Primary | ICD-10-CM

## 2025-06-25 DIAGNOSIS — G89.29 CHRONIC MIDLINE LOW BACK PAIN WITH BILATERAL SCIATICA: Primary | ICD-10-CM

## 2025-06-25 NOTE — LETTER
Anabella Baum  [address] [address]    June 26, 2025     Dear Ms. Baum:    Below are the results from your recent visit:    Resulted Orders   XR Spine Lumbar 2 or 3 View    Narrative    XR SPINE LUMBAR 2 OR 3 VW-     DATE OF EXAM: 6/24/2025 3:05 PM     INDICATION: Low back pain with walking and after walking also weakness  and leg--pain can radiate to her SI bilateral buttock.     COMPARISON: None available.     TECHNIQUE: 3 views of the lumbar spine were obtained.     FINDINGS:  Transitional lumbosacral vertebral anatomy. For the purposes of this  dictation, the transitional vertebral body will be referred to as a  partially sacralized L5 vertebral segment with right transverse process  pseudoarthrosis. If the procedure is to be contemplated, recommend  correlating with description.     Mild likely chronic/degenerative grade 1 anterolisthesis of L4 on L5.  The lumbar vertebral bodies otherwise demonstrate fairly well preserved  height and alignment. No evidence of acute fracture of the lumbar spine.  Mild multilevel intervertebral disc base narrowing. Multilevel facet  appreciated, greatest at L4-L5. Moderate to severe calcified  atherosclerotic disease. Cholecystectomy clips.       Impression    1. Transitional lumbosacral vertebral anatomy, as above.  2. Multilevel lumbar spondylosis and likely chronic/degenerative grade 1  anterolisthesis of L4 on L5, without radiographic evidence of acute  fracture of the lumbar spine.     This report was finalized on 6/25/2025 1:21 PM by Carlos Eduardo Patel MD on  Workstation: [id number]        You do have degenerative and arthritic changes in your lumbar spine worse at your lower lumbar spine at L4-L5--there is evidence of lower lumbar disc changes. Also noted is moderate to severe calcifications in your arteries.... Lets proceed with our testing we have ordered. I do not think you need to see a neurosurgeon--- that surgery is not needed for your lumbar spine  at this time I would like for you to see physical therapy to have them also evaluate your spine and this weakness     If you have any questions or concerns, please don't hesitate to call.         Sincerely,        Manuela Baum PA-C

## 2025-07-09 ENCOUNTER — HOSPITAL ENCOUNTER (OUTPATIENT)
Dept: CARDIOLOGY | Facility: HOSPITAL | Age: 60
Discharge: HOME | End: 2025-07-09
Admitting: PHYSICIAN ASSISTANT
Payer: COMMERCIAL

## 2025-07-09 ENCOUNTER — RESULTS FOLLOW-UP (OUTPATIENT)
Dept: FAMILY MEDICINE CLINIC | Facility: CLINIC | Age: 60
End: 2025-07-09
Payer: COMMERCIAL

## 2025-07-09 DIAGNOSIS — M79.605 PAIN IN BOTH LOWER EXTREMITIES: ICD-10-CM

## 2025-07-09 DIAGNOSIS — R09.89 DECREASED PEDAL PULSES: ICD-10-CM

## 2025-07-09 DIAGNOSIS — I73.9 PAD (PERIPHERAL ARTERY DISEASE): Primary | ICD-10-CM

## 2025-07-09 DIAGNOSIS — R52 PAIN AGGRAVATED BY EXERCISE: ICD-10-CM

## 2025-07-09 DIAGNOSIS — M79.604 PAIN IN BOTH LOWER EXTREMITIES: ICD-10-CM

## 2025-07-09 LAB
BH CV LOWER ARTERIAL LEFT ABI RATIO: 0.73
BH CV LOWER ARTERIAL LEFT CALF RATIO: 0.78
BH CV LOWER ARTERIAL LEFT DORSALIS PEDIS SYS MAX: 96
BH CV LOWER ARTERIAL LEFT GREAT TOE SYS MAX: 83
BH CV LOWER ARTERIAL LEFT HIGH THIGH RATIO: 0.83
BH CV LOWER ARTERIAL LEFT HIGH THIGH SYS MAX: 121
BH CV LOWER ARTERIAL LEFT LOW THIGH RATIO: 0.85
BH CV LOWER ARTERIAL LEFT LOW THIGH SYS MAX: 124
BH CV LOWER ARTERIAL LEFT POPLITEAL SYS MAX: 114
BH CV LOWER ARTERIAL LEFT POST TIBIAL SYS MAX: 106
BH CV LOWER ARTERIAL LEFT TBI RATIO: 0.57
BH CV LOWER ARTERIAL RIGHT ABI RATIO: 0.58
BH CV LOWER ARTERIAL RIGHT CALF RATIO: 0.68
BH CV LOWER ARTERIAL RIGHT DORSALIS PEDIS SYS MAX: 0
BH CV LOWER ARTERIAL RIGHT GREAT TOE SYS MAX: 79
BH CV LOWER ARTERIAL RIGHT HIGH THIGH RATIO: 0.77
BH CV LOWER ARTERIAL RIGHT HIGH THIGH SYS MAX: 112
BH CV LOWER ARTERIAL RIGHT LOW THIGH RATIO: 0.58
BH CV LOWER ARTERIAL RIGHT LOW THIGH SYS MAX: 85
BH CV LOWER ARTERIAL RIGHT POPLITEAL SYS MAX: 100
BH CV LOWER ARTERIAL RIGHT POST TIBIAL SYS MAX: 84
BH CV LOWER ARTERIAL RIGHT TBI RATIO: 0.54
UPPER ARTERIAL LEFT ARM BRACHIAL SYS MAX: 145
UPPER ARTERIAL RIGHT ARM BRACHIAL SYS MAX: 146

## 2025-07-09 PROCEDURE — 93923 UPR/LXTR ART STDY 3+ LVLS: CPT

## 2025-07-09 NOTE — LETTER
Anabella MARAVILLA Bautista  [address] [facility] [address]    July 10, 2025     Dear Ms. Baum:    Below are the results from your recent visit:    Resulted Orders   Doppler Arterial Multi Level Lower Extremity - Bilateral CAR   Result Value Ref Range    Upper arterial right arm brachial sys max 146     Upper arterial left arm brachial sys max 145     RIGHT HIGH THIGH SYS      LEFT HIGH THIGH SYS      RIGHT LOW THIGH SYS MAX 85     LEFT LOW THIGH SYS      RIGHT POPLITEAL SYS      LEFT POPLITEAL SYS      RIGHT POST TIBIAL SYS MAX 84     LEFT POST TIBIAL SYS      RIGHT DORSALIS PEDIS SYS MAX 0     LEFT DORSALIS PEDIS SYS MAX 96     RIGHT GREAT TOE SYS MAX 79     LEFT GREAT TOE SYS MAX 83     BH CV LOWER ARTERIAL RIGHT HIGH THIGH RATIO 0.77     BH CV LOWER ARTERIAL LEFT HIGH THIGH RATIO 0.83     BH CV LOWER ARTERIAL RIGHT LOW THIGH RATIO 0.58     BH CV LOWER ARTERIAL LEFT LOW THIGH RATIO 0.85     RIGHT CALF RATIO 0.68     LEFT CALF RATIO 0.78     RIGHT KEE RATIO 0.58     LEFT KEE RATIO 0.73     RIGHT TBI RATIO 0.54     LEFT TBI RATIO 0.57     Narrative    •  Right Conclusion: The right KEE is moderately reduced. Waveforms are   consistent with aorto-iliac disease. Moderate digital insufficiency.  •  Left Conclusion: The left KEE is moderately reduced. Waveforms are   consistent with aorto-iliac disease. Moderate digital insufficiency.         The results are consistent with peripheral vascular disease.... Your arteries in your legs have plaque in them and its affecting blood flow especially when you are exerting and we will refer you to a vascular surgeon for further evaluation. Stop smoking., I will copy vascular surgery on the results and ask if I should put you on a Rx of Cilostazol 100mg twice daily for now     If you have any questions or concerns, please don't hesitate to call.         Sincerely,        Manuela Baum PA-C

## 2025-07-10 RX ORDER — CILOSTAZOL 100 MG/1
100 TABLET ORAL 2 TIMES DAILY
Qty: 60 TABLET | Refills: 2 | Status: SHIPPED | OUTPATIENT
Start: 2025-07-10

## 2025-07-14 ENCOUNTER — TREATMENT (OUTPATIENT)
Dept: PHYSICAL THERAPY | Facility: CLINIC | Age: 60
End: 2025-07-14
Payer: COMMERCIAL

## 2025-07-14 DIAGNOSIS — M53.86 DECREASED RANGE OF MOTION OF LUMBAR SPINE: ICD-10-CM

## 2025-07-14 DIAGNOSIS — M25.551 CHRONIC HIP PAIN, BILATERAL: Primary | ICD-10-CM

## 2025-07-14 DIAGNOSIS — M25.651 DECREASED RANGE OF MOTION OF BOTH HIPS: ICD-10-CM

## 2025-07-14 DIAGNOSIS — G89.29 CHRONIC HIP PAIN, BILATERAL: Primary | ICD-10-CM

## 2025-07-14 DIAGNOSIS — M25.652 DECREASED RANGE OF MOTION OF BOTH HIPS: ICD-10-CM

## 2025-07-14 DIAGNOSIS — M25.552 CHRONIC HIP PAIN, BILATERAL: Primary | ICD-10-CM

## 2025-07-14 DIAGNOSIS — R29.898 DECREASED STRENGTH OF LOWER EXTREMITY: ICD-10-CM

## 2025-07-14 PROCEDURE — 97161 PT EVAL LOW COMPLEX 20 MIN: CPT

## 2025-07-14 PROCEDURE — 97110 THERAPEUTIC EXERCISES: CPT

## 2025-07-14 PROCEDURE — 97535 SELF CARE MNGMENT TRAINING: CPT

## 2025-07-14 NOTE — PROGRESS NOTES
Physical Therapy Initial Evaluation and Plan of Care  [facility] [address], [address]  [place] [address]     Patient: Anabella Baum   : 1965  Referring practitioner: Manueal Baum PA-C  Date of Initial Visit: 2025  Today's Date: 7/15/2025  Patient seen for 1 sessions  PT Clinic location: [address], [address]  [id number]          Visit Diagnoses:    ICD-10-CM ICD-9-CM   1. Chronic hip pain, bilateral  M25.551 719.45    M25.552 338.29    G89.29    2. Decreased range of motion of both hips  M25.651 719.55    M25.652    3. Decreased strength of lower extremity  R29.898 729.89   4. Decreased range of motion of lumbar spine  M53.86 724.9       Subjective Questionnaire: Back Index: 36%  LEFS: 53/80    Subjective Evaluation    History of Present Illness  Mechanism of injury: I have had back pain for years and have been diagnosed with spinal stenosis. Recently within the last few months the back pain has gotten worse. Especially within the last month I have seen significant increase pain. After walking less than 5 minutes I have increased pain in both hips, on the outside. I had a doppler for my lower legs and the results wqer different so I will be going to another provider to follow up on this. I also experience weakness in my legs after walking short distances.  I do not have any numbness or tingling in my legs. When I am sitting or standing I really do not have any problems but when I walk and ascend and descend stairs I feel an increase in the pain and weakness.   I have not had any vascular problems previously but will be scheduling an appointment for the intermittent claudication. I think I have this appointment on the .       Patient Occupation: Hospital Manager- stationary Pain  Current pain ratin  At best pain ratin  At worst pain ratin  Quality: sharp  Aggravating factors: ambulation, stairs  and repetitive movement    Social Support  Lives in: multiple-level home    Diagnostic Tests  X-ray: abnormal    Patient Goals  Patient goals for therapy: decreased pain, increased strength, independence with ADLs/IADLs and return to sport/leisure activities  Patient goal: I want to be able to walk better with less pain and fatigue.       Medical history: HLD, vitamin D deficiency, HTN, stenosis of left carotid artery, dysphagia. See chart for further detail.   Therapy Precautions: Intermittent claudication.       Objective          Palpation   Left   No palpable tenderness to the erector spinae, gluteus medius, lumbar interspinals, lumbar paraspinals, piriformis, proximal biceps femoris, proximal semimembranosus and proximal semitendinosus.   Tenderness of the gluteus nany.     Right   No palpable tenderness to the erector spinae, lumbar interspinals, lumbar paraspinals, piriformis, proximal biceps femoris, proximal semimembranosus and proximal semitendinosus. Tenderness of the gluteus nany and gluteus medius.     Tenderness     Lumbar Spine  Tenderness in the spinous process.     Left Hip   Tenderness in the greater trochanter. No tenderness in the iliac crest.     Right Hip   Tenderness in the greater trochanter. No tenderness in the iliac crest.     Additional Tenderness Details   provoke pin and radiate pain deysi the lateral hip   Sustained lumbar extension: negative    Active Range of Motion   Left Hip   External rotation (90/90): 45 degrees   Internal rotation (90/90): 20 degrees     Right Hip   External rotation (90/90): 30 degrees   Internal rotation (90/90): 20 degrees     Additional Active Range of Motion Details  Lumbar flexion: about 50% of expected ROM, no increase in pain in the lumbar spine or radiating  Lumbar extension:  about 25% of expected ROM, no increase in pain   Lumbar rotation: about 45% of expected ROM no increase in pain    Strength/Myotome Testing     Left Hip   Planes of Motion    Flexion: 4  Extension: 4  Abduction: 4  External rotation: 4+  Internal rotation: 4+    Right Hip   Planes of Motion   Flexion: 4+  Extension: 4-  Abduction: 4  External rotation: 4  Internal rotation: 4    Tests       Thoracic   Positive slump.     Lumbar     Left   Negative quadrant.     Right   Negative quadrant.     Left Hip   Positive FADIR.   Negative MARLO and scour.   SLR: Negative.     Right Hip   Positive FADIR.   Negative MARLO and scour.   SLR: Negative.     Ambulation     Observational Gait   Gait: antalgic     Quality of Movement During Gait     Pelvis    Pelvis (Left): Positive Trendelenburg.   Pelvis (Right): Positive Trendelenburg.     Comments   TM ambulation: at 2:10 some weakness was noted in the calf region, at 3 min some hip pain   Incline at 3 on TM at 4 minutes: increased right hip pain  After 5-10 seconds hip pain decreases           Assessment & Plan       Assessment  Impairments: abnormal gait, abnormal or restricted ROM, activity intolerance, impaired balance, impaired physical strength, lacks appropriate home exercise program and pain with function   Functional limitations: lifting and walking   Assessment details: Pt is a 60 year old female who presents to PT with symptoms consistent with underlying hip and lumbar deficits. Upon initial evaluation she presents to OPT with the following deficits and impairments: decreased B hip ROM and decreased LE strength, gait abnormalities, and decreased tolerance to ambulation. These deficits make it difficult for the patient to ambulate more than five minutes, ascend and descend stairs, walk to and from doctors appointments, and grocery shop. Pt would benefit from skilled PT intervention to address the deficits noted and to improve overall quality of life.   Prognosis: good    Goals  Plan Goals:  SHORT TERM GOALS: 5 weeks  1.  Patient to be compliant with HEP and demo good efficiency with TE  2.  Report < 2 sleep disturbances 2° hip pain.     3.   Increased Lumbar and hip mobility to allow for improved pelvic alignment and gait mechanics (equal step length and level pelvis throughout gait).    4.  Increased hip ER/IR ROM to WFL (IR to 30°) degrees to allow for increased ease with bed mobility and squatting.  5. Pt will report minimal-no tenderness to palpation with firm pressure.   6. Pt. Able to ambulate up to 10 min with pain < 4/10 with acceptable pattern.    7. Patient to report seeing at least a 50 improvement since beginning OPT.     LONG TERM GOALS: 10 weeks  1.  Pt. to score > 80% perceived ability on LEFS and less than a 15% on the back index for decreased perceived disability   2.  Pain level < 2/10 in hips with all activities including sitting > 1 hr continuously and transferring into ambulating.   3.  Hip  AROM to WNL to allow for return to ADL's/IADLS and functional activities without increased symptoms  4. Hip strength to 5/5  to allow for pushing, pulling and more strenuous activities to occur without pain.  Walk > 20 min.  no pain  5. No palpable tenderness to the hip.   6. Patient to report seeing at least an 80% improvement since beginning OPT.         Plan  Therapy options: will be seen for skilled therapy services  Planned modality interventions: cryotherapy, electrical stimulation/Russian stimulation, iontophoresis, TENS, thermotherapy (hydrocollator packs), traction and ultrasound  Other planned modality interventions: Dry Needling  Planned therapy interventions: abdominal trunk stabilization, ADL retraining, body mechanics training, balance/weight-bearing training, flexibility, functional ROM exercises, gait training, home exercise program, joint mobilization, manual therapy, neuromuscular re-education, postural training, soft tissue mobilization, spinal/joint mobilization, strengthening, stretching and therapeutic activities  Frequency: 2x week  Duration in weeks: 10  Treatment plan discussed with: patient        See flowsheets for  treatment detail.  Education: Discussed underlying deficits, HEP, and POC.     History # of Personal Factors and/or Comorbidities: MODERATE (1-2)  Examination of Body System(s): # of elements: LOW (1-2)  Clinical Presentation: STABLE   Clinical Decision Making: LOW       Timed:         Manual Therapy:    -     mins  23649;     Therapeutic Exercise:    15     mins  19247;     Neuromuscular Melissa:    -    mins  65174;    Therapeutic Activity:     -     mins  33298;     Gait Training:           mins  00584;     Ultrasound:          mins  40264;    Ionto                                   mins   73091  Self Care                       10     mins   99760      Un-Timed:  Electrical Stimulation:         mins  84015 ( );  Dry Needling          mins self-pay  Traction          mins 29135  Low Eval     25     Mins  84635  Mod Eval     -     Mins  24453  High Eval                       -     Mins  02976  Re-Eval                               mins  28152      Timed Treatment:   25   mins   Total Treatment:     50   mins    PT SIGNATURE: Marla Vallejo PT   [place] PT license #: [id number]  DATE TREATMENT INITIATED: 7/15/2025    Initial Certification  Certification Period: 10/12/2025  I certify that the therapy services are furnished while this patient is under my care.  The services outlined above are required by this patient, and will be reviewed every 90 days.    PHYSICIAN: Manuela Baum PA-C  NPI: [id number]                                      DATE:     Please sign and return via fax to [place] - Fax #: [phone]. Thank you, [facility].

## 2025-07-17 NOTE — PROGRESS NOTES
"Chief Complaint  Extremity Weakness    Subjective      HPI: Anabella Baum is a 60 y.o. female referred for initial evaluation of peripheral arterial disease.  2 months of BLE weakness, probably R=L. Especially after walking. ICD 1 block, unaware of ACD. Nonprogressive. No real pain. No rest pain, sores.  No previous treatments.  No CAD, CHF. No DM. 1 ppd cig smoker, no plan, but says she knows she needs to quit.  Just started cilostazol, tolerated well. No adverse symptoms yet.    Objective   Vital Signs:  /78   Pulse 110   Ht 160 cm (63\")   Wt 67.1 kg (148 lb)   BMI 26.22 kg/m²   Estimated body mass index is 26.22 kg/m² as calculated from the following:    Height as of this encounter: 160 cm (63\").    Weight as of this encounter: 67.1 kg (148 lb).        Anabella Baum  reports that she has been smoking cigarettes. She has a 35 pack-year smoking history. She has never been exposed to tobacco smoke. She has never used smokeless tobacco..     Exam: Appropriately proportioned woman, no distress.  Moves easily about exam room.  Right and left brachial and radial artery pulses palpated.  Reduced right femoral pulse.  Left femoral pulse palpated.  Right dorsalis pedis and posterior tibial artery pulses are not palpated.  Left dorsalis pedis and posterior tibial artery pulses are mildly diminished.  Skin of the feet is pink, warm and adequately perfused.  No ulcers, calluses or fissures.  No varicose veins, no lower extremity edema.    Personal review of data: Images and tracings of bilateral lower extremity arterial exam 7/9/2025, documenting moderate right and left leg ischemia with ABIs right 0.58, left 0.73 with digital pressures 79 and 83, respectively.  Based on waveforms, there is right iliac artery occlusive disease.  Multiphasic arterial waveforms are present down the left lower extremity.  Carotid duplex scan 4/25/2025 with <50% B ICA stenoses.  Common labs          8/21/2024    12:01 2/28/2025    10:12 "   Common Labs   Glucose 78  106    BUN 12  14    Creatinine 0.57  0.64    Sodium 141  141    Potassium 4.1  4.4    Chloride 104  102    Calcium 10.0  9.7    Albumin 4.6  4.7    Total Bilirubin 0.5  0.6    Alkaline Phosphatase 159  137    AST (SGOT) 23  22    ALT (SGPT) 28  26    WBC 10.44     Hemoglobin 14.8     Hematocrit 43.1     Platelets 255     Hemoglobin A1C  6.2       Assessment and Plan     Diagnoses and all orders for this visit:    1. Atherosclerosis of native arteries of extremities with intermittent claudication, bilateral legs (Primary)    2. Carotid atherosclerosis, bilateral    Summary: 60-year-old smoker referred for initial evaluation of peripheral arterial disease of the lower extremities with claudications in the form of symmetric lower extremity weakness.  No rest pain or ulceration.  No previous revascularizations.  Takes aspirin, atorvastatin and now cilostazol, the latter beginning 7/10/2025.  So far tolerating cilostazol without adverse symptoms.  Symptoms are moderate and impact her quality of life, but both she and I agree that they are not lifestyle impairing to the extent that we need revascularization at this time.  Continue cilostazol, and continue aspirin and atorvastatin.  Strong recommendation for smoking cessation.  Reassess 3 months.    Follow Up     Return in about 3 months (around 10/18/2025) for No tests. Discuss cilostazol.  Patient was given instructions and counseling regarding her condition or for health maintenance advice. Please see specific information pulled into the AVS if appropriate.

## 2025-07-18 ENCOUNTER — OFFICE VISIT (OUTPATIENT)
Age: 60
End: 2025-07-18
Payer: COMMERCIAL

## 2025-07-18 VITALS
BODY MASS INDEX: 26.22 KG/M2 | WEIGHT: 148 LBS | SYSTOLIC BLOOD PRESSURE: 138 MMHG | HEART RATE: 110 BPM | DIASTOLIC BLOOD PRESSURE: 78 MMHG | HEIGHT: 63 IN

## 2025-07-18 DIAGNOSIS — I65.23 CAROTID ATHEROSCLEROSIS, BILATERAL: ICD-10-CM

## 2025-07-18 DIAGNOSIS — I70.213 ATHEROSCLEROSIS OF NATIVE ARTERIES OF EXTREMITIES WITH INTERMITTENT CLAUDICATION, BILATERAL LEGS: Primary | ICD-10-CM

## 2025-07-18 NOTE — PATIENT INSTRUCTIONS
Quitting Smoking    Quitting smoking is a physical and mental challenge. You may have cravings, withdrawal symptoms, and temptation to smoke. Before quitting, work with your primary care provider to make a plan that can help you manage quitting. Making a plan before you quit may keep you from smoking when you have the urge to smoke while trying to quit.    Managing stress  Stress can make you want to smoke, and wanting to smoke may cause stress. It is important to find ways to manage your stress other than smoking. You could try some of the following:  Practice relaxation techniques.  Breathe slowly and deeply, in through your nose and out through your mouth.  Listen to music.  Soak in a bath or take a shower.  Imagine a peaceful place or vacation.  Get some support.  Talk with family or friends about your stress.  Join a support group.  Talk with a counselor or therapist.  Get some physical activity.  Go for a walk, run, or bike ride.  Play a favorite sport.  Practice yoga.    Medicines  Talk with your primary care provider about medicines that might help you deal with cravings and make quitting easier for you.    Relationships  Social situations can be difficult when you are quitting smoking. To manage this, you can:  Avoid parties and other social situations where people might be smoking.  Avoid alcohol.  Leave right away if you have the urge to smoke.  Explain to your family and friends that you are quitting smoking. Ask for support and let them know you might be a bit grumpy.  Plan activities where smoking is not an option.    General instructions  Be aware that many people gain weight after they quit smoking. Not everyone does. To keep from gaining weight, have a plan in place before you quit, and stick to the plan after you quit. Your plan should include:  Eating healthy snacks. When you have a craving, it may help to:  Eat popcorn, or try carrots, celery, or other cut vegetables.  Chew sugar-free  gum.  Changing how you eat.  Eat small portion sizes at meals.  Eat 4-6 small meals throughout the day instead of 1-2 large meals a day.  Be mindful when you eat. You should avoid watching television or doing other things that might distract you as you eat.  Exercising regularly.  Make time to exercise each day. If you do not have time for a long workout, do short bouts of exercise for 5-10 minutes several times a day.  Do some form of strengthening exercise, such as weight lifting.  Do some exercise that gets your heart beating and causes you to breathe deeply, such as walking fast, running, swimming, or biking. This is very important.  Drinking plenty of water or other low-calorie or no-calorie drinks. Drink enough fluid to keep your urine pale yellow.    How to recognize withdrawal symptoms  Your body and mind may experience discomfort as you try to get used to not having nicotine in your system. These effects are called withdrawal symptoms. They may include:  Feeling hungrier than normal.  Having trouble concentrating.  Feeling irritable or restless.  Having trouble sleeping.  Feeling depressed.  Craving a cigarette.  These symptoms may surprise you, but they are normal to have when quitting smoking.    To manage withdrawal symptoms:  Avoid places, people, and activities that trigger your cravings.  Remember why you want to quit.  Get plenty of sleep.  Avoid coffee and other drinks that contain caffeine. These may worsen some of your symptoms.    How to manage cravings  Come up with a plan for how to deal with your cravings. The plan should include the following:  A definition of the specific situation you want to deal with.  An activity or action you will take to replace smoking.  A clear idea for how this action will help.  The name of someone who could help you with this.  Cravings usually last for 5-10 minutes. Consider taking the following actions to help you with your plan to deal with cravings:  Keep your  mouth busy.  Chew sugar-free gum.  Suck on hard candies or a straw.  Brush your teeth.  Keep your hands and body busy.  Change to a different activity right away.  Squeeze or play with a ball.  Do an activity or a hobby, such as making bead jewelry, practicing needlepoint, or working with wood.  Mix up your normal routine.  Take a short exercise break. Go for a quick walk, or run up and down stairs.  Focus on doing something kind or helpful for someone else.  Call a friend or family member to talk during a craving.  Join a support group.  Contact a quitline.    Find support  To get help or find a support group:  Call the [employer] [place] Smoking Quitline: 6-800QUITNOW (440-9934)  Text QUIT to 828143    For more information  Visit these websites to find more information on quitting smoking:  U.S. Department of Health and Human Services: www.smokefree.gov  American Lung Association: www.freedomfromsmoking.org  Centers for Disease Control and Prevention (CDC): www.cdc.gov  American Heart Association: www.heart.org  Contact a primary care provider if:  You want to change your plan for quitting.  The medicines you are taking are not helping.  Your eating feels out of control or you cannot sleep.  You feel depressed or become very anxious.    Summary  Quitting smoking is a physical and mental challenge. You will face cravings, withdrawal symptoms, and temptation to smoke again. Preparation can help you as you go through these challenges.  Try different techniques to manage stress, handle social situations, and prevent weight gain.  You can deal with cravings by keeping your mouth busy (such as by chewing gum), keeping your hands and body busy, calling family or friends, or contacting a quitline for people who want to quit smoking.  You can deal with withdrawal symptoms by avoiding places where people smoke, getting plenty of rest, and avoiding drinks that contain caffeine.    Elsevier Patient Education © 2024  Elsevier Inc.

## 2025-07-22 ENCOUNTER — OFFICE VISIT (OUTPATIENT)
Dept: SPORTS MEDICINE | Facility: CLINIC | Age: 60
End: 2025-07-22
Payer: COMMERCIAL

## 2025-07-22 VITALS
BODY MASS INDEX: 26.22 KG/M2 | OXYGEN SATURATION: 97 % | WEIGHT: 148 LBS | SYSTOLIC BLOOD PRESSURE: 144 MMHG | DIASTOLIC BLOOD PRESSURE: 78 MMHG | TEMPERATURE: 97.3 F | HEIGHT: 63 IN | HEART RATE: 96 BPM

## 2025-07-22 DIAGNOSIS — M16.0 PRIMARY OSTEOARTHRITIS OF BOTH HIPS: ICD-10-CM

## 2025-07-22 DIAGNOSIS — M25.651 HIP JOINT STIFFNESS, BILATERAL: ICD-10-CM

## 2025-07-22 DIAGNOSIS — M25.652 HIP JOINT STIFFNESS, BILATERAL: ICD-10-CM

## 2025-07-22 DIAGNOSIS — M43.16 SPONDYLOLISTHESIS AT L4-L5 LEVEL: Primary | ICD-10-CM

## 2025-07-22 NOTE — PROGRESS NOTES
"Anabella is a 60 y.o. year old female referred by DIEGO Luevano    Chief Complaint   Patient presents with    Lumbar Spine - Pain, Initial Evaluation     Patient is here for initial evaluation of lower back pain, worse when walking. NKI, xray done on 6/04/25.       History of Present Illness  History of Present Illness  The patient presents for back pain.    Back Pain  - She reports escalating back pain due to claudication, originating bilaterally in the buttocks and extending to the back after walking for 15 minutes.  - Pain has intensified over the past few months.  - She manages it by reducing activity and stopping when pain begins.  - Physical therapy has been beneficial.  - She is uncertain about the need for surgical intervention for disc issues.  - No numbness or tingling.  - She does not use Tylenol or Advil for pain management.  - She recalls a rash after taking ibuprofen, suggesting a possible allergy.    - She has a history of high cholesterol and is on medication for it.    - She smokes cigarettes.    I have reviewed the patient's medical, family, and social history in detail and updated the computerized patient record.    Review of Systems    /78 (BP Location: Right arm, Patient Position: Sitting, Cuff Size: Adult)   Pulse 96   Temp 97.3 °F (36.3 °C) (Temporal)   Ht 160 cm (63\")   Wt 67.1 kg (148 lb)   LMP  (LMP Unknown)   SpO2 97%   BMI 26.22 kg/m²      Physical Exam    Vital signs reviewed.   General: No acute distress.      Physical Exam  Neurological:    Normal sensation in both legs.    Musculoskeletal:    No tenderness in midline lumbar or sacroiliac joints. Normal flexion, extension, and range of motion. Borderline positive slump maneuver bilaterally. Pain and restricted motion with hip neutral position. Hip pain with FADIR maneuver. Negative straight leg raise bilaterally.    Results  Results  X-ray of back:    Performed on 06/2025, the imaging showed narrowed disk space between L3-L4, " grade 1 anterolisthesis between L4-L5, and plaque in the aorta.    Pelvis X-Ray  Indication: Pain  AP and Frogleg views    Findings:  No fracture  No bony lesion  Normal soft tissues  Mild to moderate hip joint arthritis with periarticular bony hypertrophic changes    No prior studies were available for comparison.     Procedures     Diagnoses and all orders for this visit:    Spondylolisthesis at L4-L5 level  -     Ambulatory Referral to Physical Therapy for Evaluation & Treatment    Hip joint stiffness, bilateral  -     XR Pelvis 1 or 2 View    Primary osteoarthritis of both hips  -     Ambulatory Referral to Physical Therapy for Evaluation & Treatment      Assessment & Plan  Overall she has combination of spondylolisthesis in the lumbar spine as well as bilateral hip arthritis which I think is affecting her physical therapy progression.  Discussed continued physical therapy with modifications.  We could consider advanced imaging for interventional procedures to the spine or consideration of ultrasound-guided hip joint injections as needed.  Follow-up in about 4 weeks to check on progress.    Anabella Baum  reports that she has been smoking cigarettes. She has a 35 pack-year smoking history. She has never been exposed to tobacco smoke. She has never used smokeless tobacco. I have educated her on the risk of diseases from using tobacco products such as heart disease and arterial disease.     I advised her to quit and she is not willing to quit.    I spent 1 minutes counseling the patient.          Patient or patient representative verbalized consent for the use of Ambient Listening during the visit with  Yan Keller MD for chart documentation. 00:42 EDT 07/27/25    *Dictated after leaving exam room.     Yan Keller MD   00:42 EDT   07/27/25

## 2025-07-23 ENCOUNTER — TREATMENT (OUTPATIENT)
Dept: PHYSICAL THERAPY | Facility: CLINIC | Age: 60
End: 2025-07-23
Payer: COMMERCIAL

## 2025-07-23 DIAGNOSIS — G89.29 CHRONIC HIP PAIN, BILATERAL: Primary | ICD-10-CM

## 2025-07-23 DIAGNOSIS — M25.552 CHRONIC HIP PAIN, BILATERAL: Primary | ICD-10-CM

## 2025-07-23 DIAGNOSIS — M53.86 DECREASED RANGE OF MOTION OF LUMBAR SPINE: ICD-10-CM

## 2025-07-23 DIAGNOSIS — M25.551 CHRONIC HIP PAIN, BILATERAL: Primary | ICD-10-CM

## 2025-07-23 DIAGNOSIS — R29.898 DECREASED STRENGTH OF LOWER EXTREMITY: ICD-10-CM

## 2025-07-23 DIAGNOSIS — M25.652 DECREASED RANGE OF MOTION OF BOTH HIPS: ICD-10-CM

## 2025-07-23 DIAGNOSIS — M25.651 DECREASED RANGE OF MOTION OF BOTH HIPS: ICD-10-CM

## 2025-07-23 PROCEDURE — 97110 THERAPEUTIC EXERCISES: CPT

## 2025-07-23 PROCEDURE — 97530 THERAPEUTIC ACTIVITIES: CPT

## 2025-07-23 NOTE — PROGRESS NOTES
Physical Therapy Daily Treatment Note  [facility] Physical Therapy [place]  [address], [address]  [place] [address]     Patient: Anabella Baum  : 1965  Referring practitioner: Manuela Baum PA-C  Today's Date: 2025    VISIT#: 2    Visit Diagnoses:    ICD-10-CM ICD-9-CM   1. Chronic hip pain, bilateral  M25.551 719.45    M25.552 338.29    G89.29    2. Decreased range of motion of both hips  M25.651 719.55    M25.652    3. Decreased strength of lower extremity  R29.898 729.89   4. Decreased range of motion of lumbar spine  M53.86 724.9       Subjective   Anabella Baum reports: that she she has been feeling a little better, less pain. She is hoping to push herself a little more but exercises are going well. She was able to weed her flower beds recently with less pain, still some discomfort but better.       Objective       See Exercise, Manual, and Modality Logs for complete treatment.     Patient Education: HEP review  Exercise rationale/ pain free exercise performance  Alternate exercise positions  Verbal/Tactile cues to ensure correct exercise performance/technique       Assessment/Plan  Patient demonstrated good tolerance to continued and new therapeutic exercises with no report of increased pain during or at the end of the session. Introduced more lumbar and hip mobility exercises including supine hip flexor stretch, DKTC for both hip flexion and lumbar flexion, and lumbar rotation. Will continue to progress as tolerated, beginning with some gentle hip strengthening exercises next visit. Patient would benefit from continued therapeutic interventions.       Progress per Plan of Care and Progress strengthening /stabilization /functional activity          Timed:         Manual Therapy:    -     mins  90991;     Therapeutic Exercise:    22     mins  56135;     Neuromuscular Melissa:    -    mins  52828;    Therapeutic Activity:     8     mins  72037;     Gait Training:           mins   20316;     Ultrasound:          mins  77195;    Ionto:                                   mins  47901  Self Care:                       -     mins  53211    Un-Timed:  Electrical Stimulation:         mins  78220 ( );  Dry Needling          mins self-pay  Traction          mins 03887  Re-Eval                               mins  02066  Group Therapy           ___ mins 49942    Timed Treatment:   30   mins   Total Treatment:     39   mins    Marla Vallejo PT  Physical Therapist  Tashia MACIAS license #: [id number]

## 2025-07-25 ENCOUNTER — TELEPHONE (OUTPATIENT)
Age: 60
End: 2025-07-25
Payer: COMMERCIAL

## 2025-07-25 DIAGNOSIS — Z13.6 SCREENING FOR ISCHEMIC HEART DISEASE: ICD-10-CM

## 2025-07-25 DIAGNOSIS — Z72.0 TOBACCO ABUSE: ICD-10-CM

## 2025-07-25 DIAGNOSIS — R73.01 IMPAIRED FASTING GLUCOSE: ICD-10-CM

## 2025-07-25 DIAGNOSIS — E78.2 MIXED HYPERLIPIDEMIA: ICD-10-CM

## 2025-07-25 DIAGNOSIS — E55.9 VITAMIN D DEFICIENCY: ICD-10-CM

## 2025-07-25 DIAGNOSIS — I10 BENIGN ESSENTIAL HTN: ICD-10-CM

## 2025-07-25 DIAGNOSIS — K21.9 GASTROESOPHAGEAL REFLUX DISEASE WITHOUT ESOPHAGITIS: ICD-10-CM

## 2025-07-25 DIAGNOSIS — I65.23 BILATERAL CAROTID ARTERY STENOSIS: ICD-10-CM

## 2025-07-25 DIAGNOSIS — E53.8 LOW SERUM VITAMIN B12: ICD-10-CM

## 2025-07-25 NOTE — TELEPHONE ENCOUNTER
Called patient for Patient rounding. No answer. Left voicemail that there was no need to call me back.

## 2025-07-26 ENCOUNTER — PATIENT ROUNDING (BHMG ONLY) (OUTPATIENT)
Dept: SPORTS MEDICINE | Facility: CLINIC | Age: 60
End: 2025-07-26
Payer: COMMERCIAL

## 2025-07-26 LAB
25(OH)D3+25(OH)D2 SERPL-MCNC: 45.1 NG/ML (ref 30–100)
ALBUMIN SERPL-MCNC: 4.6 G/DL (ref 3.9–4.9)
ALP SERPL-CCNC: 139 IU/L (ref 44–121)
ALT SERPL-CCNC: 22 IU/L (ref 0–32)
APPEARANCE UR: CLEAR
AST SERPL-CCNC: 19 IU/L (ref 0–40)
BACTERIA #/AREA URNS HPF: NORMAL /[HPF]
BASOPHILS # BLD AUTO: 0 X10E3/UL (ref 0–0.2)
BASOPHILS NFR BLD AUTO: 0 %
BILIRUB SERPL-MCNC: 0.6 MG/DL (ref 0–1.2)
BILIRUB UR QL STRIP: NEGATIVE
BUN SERPL-MCNC: 13 MG/DL (ref 8–27)
BUN/CREAT SERPL: 20 (ref 12–28)
CALCIUM SERPL-MCNC: 9.8 MG/DL (ref 8.7–10.3)
CASTS URNS QL MICRO: NORMAL /LPF
CHLORIDE SERPL-SCNC: 101 MMOL/L (ref 96–106)
CHOLEST SERPL-MCNC: 127 MG/DL (ref 100–199)
CO2 SERPL-SCNC: 23 MMOL/L (ref 20–29)
COLOR UR: YELLOW
CREAT SERPL-MCNC: 0.65 MG/DL (ref 0.57–1)
EGFRCR SERPLBLD CKD-EPI 2021: 100 ML/MIN/1.73
EOSINOPHIL # BLD AUTO: 0.2 X10E3/UL (ref 0–0.4)
EOSINOPHIL NFR BLD AUTO: 2 %
EPI CELLS #/AREA URNS HPF: NORMAL /HPF (ref 0–10)
ERYTHROCYTE [DISTWIDTH] IN BLOOD BY AUTOMATED COUNT: 12 % (ref 11.7–15.4)
FOLATE SERPL-MCNC: 18.8 NG/ML
GLOBULIN SER CALC-MCNC: 2.3 G/DL (ref 1.5–4.5)
GLUCOSE SERPL-MCNC: 100 MG/DL (ref 70–99)
GLUCOSE UR QL STRIP: NEGATIVE
HBA1C MFR BLD: 6.3 % (ref 4.8–5.6)
HCT VFR BLD AUTO: 43 % (ref 34–46.6)
HDLC SERPL-MCNC: 43 MG/DL
HGB BLD-MCNC: 14 G/DL (ref 11.1–15.9)
HGB UR QL STRIP: ABNORMAL
IMM GRANULOCYTES # BLD AUTO: 0 X10E3/UL (ref 0–0.1)
IMM GRANULOCYTES NFR BLD AUTO: 0 %
KETONES UR QL STRIP: NEGATIVE
LDLC SERPL CALC-MCNC: 65 MG/DL (ref 0–99)
LEUKOCYTE ESTERASE UR QL STRIP: NEGATIVE
LYMPHOCYTES # BLD AUTO: 2.2 X10E3/UL (ref 0.7–3.1)
LYMPHOCYTES NFR BLD AUTO: 23 %
MAGNESIUM SERPL-MCNC: 2 MG/DL (ref 1.6–2.3)
MCH RBC QN AUTO: 30.8 PG (ref 26.6–33)
MCHC RBC AUTO-ENTMCNC: 32.6 G/DL (ref 31.5–35.7)
MCV RBC AUTO: 95 FL (ref 79–97)
MICRO URNS: ABNORMAL
MONOCYTES # BLD AUTO: 0.7 X10E3/UL (ref 0.1–0.9)
MONOCYTES NFR BLD AUTO: 7 %
NEUTROPHILS # BLD AUTO: 6.6 X10E3/UL (ref 1.4–7)
NEUTROPHILS NFR BLD AUTO: 68 %
NITRITE UR QL STRIP: NEGATIVE
PH UR STRIP: 6 [PH] (ref 5–7.5)
PLATELET # BLD AUTO: 243 X10E3/UL (ref 150–450)
POTASSIUM SERPL-SCNC: 4.2 MMOL/L (ref 3.5–5.2)
PROT SERPL-MCNC: 6.9 G/DL (ref 6–8.5)
PROT UR QL STRIP: NEGATIVE
RBC # BLD AUTO: 4.55 X10E6/UL (ref 3.77–5.28)
RBC #/AREA URNS HPF: NORMAL /HPF (ref 0–2)
SODIUM SERPL-SCNC: 139 MMOL/L (ref 134–144)
SP GR UR STRIP: 1.01 (ref 1–1.03)
T4 FREE SERPL-MCNC: 1.18 NG/DL (ref 0.82–1.77)
TRIGL SERPL-MCNC: 100 MG/DL (ref 0–149)
TSH SERPL DL<=0.005 MIU/L-ACNC: 0.9 UIU/ML (ref 0.45–4.5)
UROBILINOGEN UR STRIP-MCNC: 0.2 MG/DL (ref 0.2–1)
VIT B12 SERPL-MCNC: 888 PG/ML (ref 232–1245)
VLDLC SERPL CALC-MCNC: 19 MG/DL (ref 5–40)
WBC # BLD AUTO: 9.7 X10E3/UL (ref 3.4–10.8)
WBC #/AREA URNS HPF: NORMAL /HPF (ref 0–5)

## 2025-07-27 NOTE — PROGRESS NOTES
A [employer] Message has been sent to the patient for PATIENT ROUNDING with [facility]

## 2025-07-31 ENCOUNTER — TREATMENT (OUTPATIENT)
Dept: PHYSICAL THERAPY | Facility: CLINIC | Age: 60
End: 2025-07-31
Payer: COMMERCIAL

## 2025-07-31 DIAGNOSIS — M25.551 CHRONIC HIP PAIN, BILATERAL: Primary | ICD-10-CM

## 2025-07-31 DIAGNOSIS — R29.898 DECREASED STRENGTH OF LOWER EXTREMITY: ICD-10-CM

## 2025-07-31 DIAGNOSIS — M53.86 DECREASED RANGE OF MOTION OF LUMBAR SPINE: ICD-10-CM

## 2025-07-31 DIAGNOSIS — M25.552 CHRONIC HIP PAIN, BILATERAL: Primary | ICD-10-CM

## 2025-07-31 DIAGNOSIS — M25.652 DECREASED RANGE OF MOTION OF BOTH HIPS: ICD-10-CM

## 2025-07-31 DIAGNOSIS — G89.29 CHRONIC HIP PAIN, BILATERAL: Primary | ICD-10-CM

## 2025-07-31 DIAGNOSIS — M25.651 DECREASED RANGE OF MOTION OF BOTH HIPS: ICD-10-CM

## 2025-07-31 PROCEDURE — 97112 NEUROMUSCULAR REEDUCATION: CPT

## 2025-07-31 PROCEDURE — 97530 THERAPEUTIC ACTIVITIES: CPT

## 2025-07-31 PROCEDURE — 97110 THERAPEUTIC EXERCISES: CPT

## 2025-07-31 NOTE — PROGRESS NOTES
Physical Therapy Daily Treatment Note  [facility] Physical Therapy [place]  [address], [address]  [place] [address]     Patient: Anabella Baum  : 1965  Referring practitioner: Manuela Baum PA-C  Today's Date: 2025    VISIT#: 3    Visit Diagnoses:    ICD-10-CM ICD-9-CM   1. Chronic hip pain, bilateral  M25.551 719.45    M25.552 338.29    G89.29    2. Decreased range of motion of both hips  M25.651 719.55    M25.652    3. Decreased strength of lower extremity  R29.898 729.89   4. Decreased range of motion of lumbar spine  M53.86 724.9       Subjective   Anabella Baum reports: that she continues to feel less pain in the hips but has not been able to push herself to see if she continues to have the intermittent claudication pain.       Objective       See Exercise, Manual, and Modality Logs for complete treatment.     Patient Education: HEP review  Exercise rationale/ pain free exercise performance  Alternate exercise positions  Verbal/Tactile cues to ensure correct exercise performance/technique       Assessment/Plan  Patient demonstrates good tolerance to continued and new therapeutic exercises on this date with no report of increased hip pain during or at the end of the session. Continued with gentle hip mobility exercises and introduced LE strength with clamshells, SLR, and glute bridges. Patient is progressing well and will continue to benefit from skilled therapeutic interventions. Will continue to progress as tolerated. Within the next couple visits will retest walking on the TM.       Progress per Plan of Care and Progress strengthening /stabilization /functional activity          Timed:         Manual Therapy:    -     mins  30483;     Therapeutic Exercise:    12     mins  86066;     Neuromuscular Melissa:    10    mins  56129;    Therapeutic Activity:     8     mins  52804;     Gait Training:           mins  85020;     Ultrasound:          mins  07508;    Ionto:                                    mins  53013  Self Care:                       -     mins  13831    Un-Timed:  Electrical Stimulation:         mins  59935 ( );  Dry Needling          mins self-pay  Traction          mins 12701  Re-Eval                               mins  87668  Group Therapy           ___ mins 10715    Timed Treatment:   25   mins   Total Treatment:     43   mins    Marla Vallejo PT  Physical Therapist  Tashia MACIAS license #: [id number]

## 2025-08-13 ENCOUNTER — TELEPHONE (OUTPATIENT)
Dept: ORTHOPEDICS | Facility: OTHER | Age: 60
End: 2025-08-13
Payer: COMMERCIAL

## (undated) DEVICE — FRCP BX RADJAW4 NDL 2.8 240CM LG OG BX40

## (undated) DEVICE — CANN O2 ETCO2 FITS ALL CONN CO2 SMPL A/ 7IN DISP LF

## (undated) DEVICE — LN SMPL CO2 SHTRM SD STREAM W/M LUER

## (undated) DEVICE — GOWN PROC ENDOARMOR GI LVL3 HY/SHLD UNIV

## (undated) DEVICE — FLEX ADVANTAGE 1500CC: Brand: FLEX ADVANTAGE

## (undated) DEVICE — MSK ENDO PORT O2 POM ELITE CURAPLEX A/

## (undated) DEVICE — SENSR O2 OXIMAX FNGR A/ 18IN NONSTR

## (undated) DEVICE — KT ORCA ORCAPOD DISP STRL

## (undated) DEVICE — Device

## (undated) DEVICE — BITEBLOCK OMNI BLOC

## (undated) DEVICE — BLCK/BITE BLOX W/DENTL/RIM W/STRAP 54F

## (undated) DEVICE — VIAL FORMLN CAP 10PCT 20ML

## (undated) DEVICE — ADAPT CLN BIOGUARD AIR/H2O DISP

## (undated) DEVICE — ADAPT CLN SCPE ENDO PORPOISE BX/50 DISP

## (undated) DEVICE — TUBING, SUCTION, 1/4" X 10', STRAIGHT: Brand: MEDLINE

## (undated) DEVICE — SINGLE-USE BIOPSY FORCEPS: Brand: RADIAL JAW 4